# Patient Record
Sex: FEMALE | Race: WHITE | Employment: OTHER | URBAN - METROPOLITAN AREA
[De-identification: names, ages, dates, MRNs, and addresses within clinical notes are randomized per-mention and may not be internally consistent; named-entity substitution may affect disease eponyms.]

---

## 2017-04-18 ENCOUNTER — OFFICE VISIT (OUTPATIENT)
Dept: FAMILY MEDICINE | Facility: CLINIC | Age: 62
End: 2017-04-18
Payer: COMMERCIAL

## 2017-04-18 VITALS
SYSTOLIC BLOOD PRESSURE: 105 MMHG | WEIGHT: 103.25 LBS | TEMPERATURE: 97.6 F | HEART RATE: 79 BPM | DIASTOLIC BLOOD PRESSURE: 53 MMHG | HEIGHT: 66 IN | OXYGEN SATURATION: 98 % | RESPIRATION RATE: 18 BRPM | BODY MASS INDEX: 16.59 KG/M2

## 2017-04-18 DIAGNOSIS — F33.1 MAJOR DEPRESSIVE DISORDER, RECURRENT EPISODE, MODERATE (H): Primary | ICD-10-CM

## 2017-04-18 PROBLEM — F33.41 RECURRENT MAJOR DEPRESSIVE DISORDER, IN PARTIAL REMISSION (H): Status: ACTIVE | Noted: 2017-04-18

## 2017-04-18 PROBLEM — F33.41 RECURRENT MAJOR DEPRESSIVE DISORDER, IN PARTIAL REMISSION (H): Status: RESOLVED | Noted: 2017-04-18 | Resolved: 2017-04-18

## 2017-04-18 PROCEDURE — 99214 OFFICE O/P EST MOD 30 MIN: CPT | Performed by: NURSE PRACTITIONER

## 2017-04-18 RX ORDER — BUPROPION HYDROCHLORIDE 150 MG/1
150 TABLET ORAL EVERY MORNING
Qty: 30 TABLET | Refills: 1 | Status: SHIPPED | OUTPATIENT
Start: 2017-04-18 | End: 2018-01-16

## 2017-04-18 ASSESSMENT — ANXIETY QUESTIONNAIRES
2. NOT BEING ABLE TO STOP OR CONTROL WORRYING: NOT AT ALL
7. FEELING AFRAID AS IF SOMETHING AWFUL MIGHT HAPPEN: NOT AT ALL
6. BECOMING EASILY ANNOYED OR IRRITABLE: NOT AT ALL
5. BEING SO RESTLESS THAT IT IS HARD TO SIT STILL: NOT AT ALL
1. FEELING NERVOUS, ANXIOUS, OR ON EDGE: NOT AT ALL
GAD7 TOTAL SCORE: 0
3. WORRYING TOO MUCH ABOUT DIFFERENT THINGS: NOT AT ALL

## 2017-04-18 ASSESSMENT — PATIENT HEALTH QUESTIONNAIRE - PHQ9: 5. POOR APPETITE OR OVEREATING: NOT AT ALL

## 2017-04-18 NOTE — PROGRESS NOTES
"  SUBJECTIVE:                                                    Deya Mantilla is a 61 year old female who presents to clinic today for the following health issues:      Medication Followup of Lexapro 10 MG    Taking Medication as prescribed: yes. She states if she forgets to take Lexapro she does notice decreased mood.     Side Effects: Pt states that she seems to be more tired, she does sleep well, however in the morning she becomes drowsy, subdued, and sluggish. She also states that she has been a vegetarian for 10 years, she wonders if she is lacking protein.     Medication Helping Symptoms: YES.      She feels that Lexapro is a bit less effective than it had been initially.  She has noticed some decreased energy, fatigue and difficulty concentrating.        Problem list and histories reviewed & adjusted, as indicated.  Additional history: as documented        Reviewed and updated as needed this visit by clinical staff       Reviewed and updated as needed this visit by Provider         ROS:  C: NEGATIVE for fever, chills, change in weight  E/M: NEGATIVE for ear, mouth and throat problems  R: NEGATIVE for significant cough or SOB  CV: NEGATIVE for chest pain, palpitations or peripheral edema  GI: NEGATIVE for nausea, abdominal pain, heartburn, or change in bowel habits  MUSCULOSKELETAL: NEGATIVE for significant arthralgias or myalgia  NEURO: NEGATIVE for weakness, dizziness or paresthesias  PSYCHIATRIC: see HPI    OBJECTIVE:                                                    /53  Pulse 79  Temp 97.6  F (36.4  C) (Oral)  Resp 18  Ht 5' 5.5\" (1.664 m)  Wt 103 lb 4 oz (46.8 kg)  SpO2 98%  BMI 16.92 kg/m2  Body mass index is 16.92 kg/(m^2).  GENERAL: healthy, alert and no distress  RESP: lungs clear to auscultation - no rales, rhonchi or wheezes  CV: regular rate and rhythm, normal S1 S2, no S3 or S4, no murmur, click or rub, no peripheral edema and peripheral pulses strong  PSYCH: mentation " appears normal, affect normal/bright; PHQ-9 score of 8         ASSESSMENT/PLAN:                                                            1. Major depressive disorder, recurrent episode, moderate (H)  Not in remission  Will add Wellbutrin  mg daily and continue with Lexapro 20 mg.  Follow up in one month.   - buPROPion (WELLBUTRIN XL) 150 MG 24 hr tablet; Take 1 tablet (150 mg) by mouth every morning  Dispense: 30 tablet; Refill: 1        Kavita Lombardo NP  [facility]

## 2017-04-18 NOTE — PATIENT INSTRUCTIONS
Add Wellbutrin daily in the morning.   Continue with Lexapro.    Follow up in one month (can do an e-visit).

## 2017-04-18 NOTE — LETTER
My Depression Action Plan  Name: Deya Mantilla   Date of Birth 1955  Date: 4/18/2017    My doctor: Kavita Lombardo   My clinic: [address] [address]  [phone]          GREEN    ZONE   Good Control    What it looks like:     Things are going generally well. You have normal up s and down s. You may even feel depressed from time to time, but bad moods usually last less than a day.   What you need to do:  1. Continue to care for yourself (see self care plan)  2. Check your depression survival kit and update it as needed  3. Follow your physician s recommendations including any medication.  4. Do not stop taking medication unless you consult with your physician first.           YELLOW         ZONE Getting Worse    What it looks like:     Depression is starting to interfere with your life.     It may be hard to get out of bed; you may be starting to isolate yourself from others.    Symptoms of depression are starting to last most all day and this has happened for several days.     You may have suicidal thoughts but they are not constant.   What you need to do:     1. Call your care team, your response to treatment will improve if you keep your care team informed of your progress. Yellow periods are signs an adjustment may need to be made.     2. Continue your self-care, even if you have to fake it!    3. Talk to someone in your support network    4. Open up your depression survival kit           RED    ZONE Medical Alert - Get Help    What it looks like:     Depression is seriously interfering with your life.     You may experience these or other symptoms: You can t get out of bed most days, can t work or engage in other necessary activities, you have trouble taking care of basic hygiene, or basic responsibilities, thoughts of suicide or death that will not go away, self-injurious behavior.     What you need to do:  1. Call your care  team and request a same-day appointment. If they are not available (weekends or after hours) call your local crisis line, emergency room or 911.      Electronically signed by: Michelle Evans, April 18, 2017    Depression Self Care Plan / Survival Kit    Self-Care for Depression  Here s the deal. Your body and mind are really not as separate as most people think.  What you do and think affects how you feel and how you feel influences what you do and think. This means if you do things that people who feel good do, it will help you feel better.  Sometimes this is all it takes.  There is also a place for medication and therapy depending on how severe your depression is, so be sure to consult with your medical provider and/ or Behavioral Health Consultant if your symptoms are worsening or not improving.     In order to better manage my stress, I will:    Exercise  Get some form of exercise, every day. This will help reduce pain and release endorphins, the  feel good  chemicals in your brain. This is almost as good as taking antidepressants!  This is not the same as joining a gym and then never going! (they count on that by the way ) It can be as simple as just going for a walk or doing some gardening, anything that will get you moving.      Hygiene   Maintain good hygiene (Get out of bed in the morning, Make your bed, Brush your teeth, Take a shower, and Get dressed like you were going to work, even if you are unemployed).  If your clothes don't fit try to get ones that do.    Diet  I will strive to eat foods that are good for me, drink plenty of water, and avoid excessive sugar, caffeine, alcohol, and other mood-altering substances.  Some foods that are helpful in depression are: complex carbohydrates, B vitamins, flaxseed, fish or fish oil, fresh fruits and vegetables.    Psychotherapy  I agree to participate in Individual Therapy (if recommended).    Medication  If prescribed medications, I agree to take them.  Missing  doses can result in serious side effects.  I understand that drinking alcohol, or other illicit drug use, may cause potential side effects.  I will not stop my medication abruptly without first discussing it with my provider.    Staying Connected With Others  I will stay in touch with my friends, family members, and my primary care provider/team.    Use your imagination  Be creative.  We all have a creative side; it doesn t matter if it s oil painting, sand castles, or mud pies! This will also kick up the endorphins.    Witness Beauty  (AKA stop and smell the roses) Take a look outside, even in mid-winter. Notice colors, textures. Watch the squirrels and birds.     Service to others  Be of service to others.  There is always someone else in need.  By helping others we can  get out of ourselves  and remember the really important things.  This also provides opportunities for practicing all the other parts of the program.    Humor  Laugh and be silly!  Adjust your TV habits for less news and crime-drama and more comedy.    Control your stress  Try breathing deep, massage therapy, biofeedback, and meditation. Find time to relax each day.     My support system    Clinic Contact:  Phone number:    Contact 1:  Phone number:    Contact 2:  Phone number:    Sikhism/:  Phone number:    Therapist:  Phone number:    Local crisis center:    Phone number:    Other community support:  Phone number:

## 2017-04-18 NOTE — MR AVS SNAPSHOT
After Visit Summary   4/18/2017    Deya Mantilla    MRN: [id number]           Patient Information     Date Of Birth          1955        Visit Information        Provider Department      4/18/2017 8:15 AM Kavita Lombardo NP [facility]        Today's Diagnoses     Major depressive disorder, recurrent episode, moderate (H)    -  1      Care Instructions    Add Wellbutrin daily in the morning.   Continue with Lexapro.    Follow up in one month (can do an e-visit).         Follow-ups after your visit        Your next 10 appointments already scheduled     May 30, 2017  8:15 AM CDT   (Arrive by 8:00 AM)   MA SCREENING DIGITAL BILATERAL with [id number]   [facility] ([facility] [place])    [address] [address] [phone]           Do not use any powder, lotion or deodorant under your arms or on your breast. If you do, we will ask you to remove it before your exam.  Wear comfortable, two-piece clothing.  If you have any allergies, tell your care team.  Bring any previous mammograms from other facilities or have them mailed to the breast center. This mammogram location, [facility], now offers 3D mammography. It doesn't replace a screening mammogram and can be done with a regular screening mammogram. It is optional and not all insurances will pay for it. 3D mammography is a special kind of mammogram that produces a three-dimensional image of the breast by using low dose-xrays. 3D allows the radiologist to see the breast tissue differently from 2D, which reduces the chance of repeat testing due to overlapping breast tissue. If you are interested in have a 3D mammogram, please check with your insurance before you arrive for your exam. 3D mammography is offered to all patients. On the day of your exam you will be asked to sign a form stating yes, you wish to have 3D imaging  "or, no, you decline.              Future tests that were ordered for you today     Open Future Orders        Priority Expected Expires Ordered    *MA Screening Digital Bilateral Routine  4/18/2018 4/18/2017            Who to contact     If you have questions or need follow up information about today's clinic visit or your schedule please contact [facility] directly at [phone].  Normal or non-critical lab and imaging results will be communicated to you by Sightlogixhart, letter or phone within 4 business days after the clinic has received the results. If you do not hear from us within 7 days, please contact the clinic through [employer] or phone. If you have a critical or abnormal lab result, we will notify you by phone as soon as possible.  Submit refill requests through ZeroCater or call your pharmacy and they will forward the refill request to us. Please allow 3 business days for your refill to be completed.          Additional Information About Your Visit        S[employer]Net Information     ZeroCater gives you secure access to your electronic health record. If you see a primary care provider, you can also send messages to your care team and make appointments. If you have questions, please call your primary care clinic.  If you do not have a primary care provider, please call [phone] and they will assist you.        Care EveryWhere ID     This is your Care EveryWhere ID. This could be used by other organizations to access your [place] medical records  [id number]        Your Vitals Were     Pulse Temperature Respirations Height Pulse Oximetry BMI (Body Mass Index)    79 97.6  F (36.4  C) (Oral) 18 5' 5.5\" (1.664 m) 98% 16.92 kg/m2       Blood Pressure from Last 3 Encounters:   04/18/17 105/53   08/31/16 104/62   02/10/16 96/56    Weight from Last 3 Encounters:   04/18/17 103 lb 4 oz (46.8 kg)   08/31/16 101 lb (45.8 kg)   02/10/16 102 lb (46.3 kg)              We Performed the Following     DEPRESSION " ACTION PLAN (DAP)          Today's Medication Changes          These changes are accurate as of: 4/18/17  9:05 AM.  If you have any questions, ask your nurse or doctor.               Start taking these medicines.        Dose/Directions    buPROPion 150 MG 24 hr tablet   Commonly known as:  WELLBUTRIN XL   Used for:  Major depressive disorder, recurrent episode, moderate (H)   Started by:  Kavita Lombardo NP        Dose:  150 mg   Take 1 tablet (150 mg) by mouth every morning   Quantity:  30 tablet   Refills:  1            Where to get your medicines      These medications were sent to [facility] - [place], [place] - [address]  [address], [place] [address]     Phone:  [phone]     buPROPion 150 MG 24 hr tablet                Primary Care Provider Office Phone # Fax #    Kavita Lombardo [phone]       [facility] [address] [place]  [facility] [address]        Thank you!     Thank you for choosing [facility]  for your care. Our goal is always to provide you with excellent care. Hearing back from our patients is one way we can continue to improve our services. Please take a few minutes to complete the written survey that you may receive in the mail after your visit with us. Thank you!             Your Updated Medication List - Protect others around you: Learn how to safely use, store and throw away your medicines at www.disposemymeds.org.          This list is accurate as of: 4/18/17  9:05 AM.  Always use your most recent med list.                   Brand Name Dispense Instructions for use    buPROPion 150 MG 24 hr tablet    WELLBUTRIN XL    30 tablet    Take 1 tablet (150 mg) by mouth every morning       calcium 1250 MG Tabs      once daily       escitalopram 10 MG tablet    LEXAPRO    90 tablet    TAKE ONE TABLET BY MOUTH EVERY DAY       MULTIVITAMIN TABS   OR      once daily

## 2017-04-18 NOTE — NURSING NOTE
"Chief Complaint   Patient presents with     Recheck Medication     Lexapro        Initial /53  Pulse 79  Temp 97.6  F (36.4  C) (Oral)  Resp 18  Ht 5' 5.5\" (1.664 m)  Wt 103 lb 4 oz (46.8 kg)  SpO2 98%  BMI 16.92 kg/m2 Estimated body mass index is 16.92 kg/(m^2) as calculated from the following:    Height as of this encounter: 5' 5.5\" (1.664 m).    Weight as of this encounter: 103 lb 4 oz (46.8 kg).  Medication Reconciliation: complete     Michelle Evans MA      "

## 2017-04-19 ASSESSMENT — PATIENT HEALTH QUESTIONNAIRE - PHQ9: SUM OF ALL RESPONSES TO PHQ QUESTIONS 1-9: 8

## 2017-04-19 ASSESSMENT — ANXIETY QUESTIONNAIRES: GAD7 TOTAL SCORE: 0

## 2017-05-08 ENCOUNTER — MYC MEDICAL ADVICE (OUTPATIENT)
Dept: FAMILY MEDICINE | Facility: CLINIC | Age: 62
End: 2017-05-08

## 2017-05-09 ENCOUNTER — MYC MEDICAL ADVICE (OUTPATIENT)
Dept: FAMILY MEDICINE | Facility: CLINIC | Age: 62
End: 2017-05-09

## 2017-05-09 NOTE — TELEPHONE ENCOUNTER
That is fine if she doesn't want to take the Wellbutrin, but her symptoms seem to be related to a likely UTI, not a medication side effect.  I would recommend coming in to be evaluated for a UTI if she still has any symptoms.

## 2017-05-15 ENCOUNTER — MYC MEDICAL ADVICE (OUTPATIENT)
Dept: FAMILY MEDICINE | Facility: CLINIC | Age: 62
End: 2017-05-15

## 2017-05-16 ENCOUNTER — OFFICE VISIT (OUTPATIENT)
Dept: URGENT CARE | Facility: URGENT CARE | Age: 62
End: 2017-05-16
Payer: COMMERCIAL

## 2017-05-16 VITALS
HEART RATE: 91 BPM | BODY MASS INDEX: 17.16 KG/M2 | TEMPERATURE: 99 F | OXYGEN SATURATION: 97 % | HEIGHT: 65 IN | DIASTOLIC BLOOD PRESSURE: 67 MMHG | WEIGHT: 103 LBS | SYSTOLIC BLOOD PRESSURE: 110 MMHG

## 2017-05-16 DIAGNOSIS — R30.0 DYSURIA: ICD-10-CM

## 2017-05-16 DIAGNOSIS — R31.9 URINARY TRACT INFECTION WITH HEMATURIA, SITE UNSPECIFIED: Primary | ICD-10-CM

## 2017-05-16 DIAGNOSIS — J20.9 ACUTE BRONCHITIS, UNSPECIFIED ORGANISM: ICD-10-CM

## 2017-05-16 DIAGNOSIS — N39.0 URINARY TRACT INFECTION WITH HEMATURIA, SITE UNSPECIFIED: Primary | ICD-10-CM

## 2017-05-16 LAB
ALBUMIN UR-MCNC: 30 MG/DL
APPEARANCE UR: CLEAR
BACTERIA #/AREA URNS HPF: ABNORMAL /HPF
BILIRUB UR QL STRIP: NEGATIVE
COLOR UR AUTO: YELLOW
GLUCOSE UR STRIP-MCNC: NEGATIVE MG/DL
HGB UR QL STRIP: ABNORMAL
KETONES UR STRIP-MCNC: NEGATIVE MG/DL
LEUKOCYTE ESTERASE UR QL STRIP: ABNORMAL
MICRO REPORT STATUS: NORMAL
NITRATE UR QL: NEGATIVE
NON-SQ EPI CELLS #/AREA URNS LPF: ABNORMAL /LPF
PH UR STRIP: 6 PH (ref 5–7)
RBC #/AREA URNS AUTO: ABNORMAL /HPF (ref 0–2)
SP GR UR STRIP: 1.02 (ref 1–1.03)
SPECIMEN SOURCE: NORMAL
TRANS CELLS #/AREA URNS HPF: ABNORMAL /HPF
URN SPEC COLLECT METH UR: ABNORMAL
UROBILINOGEN UR STRIP-ACNC: 0.2 EU/DL (ref 0.2–1)
WBC #/AREA URNS AUTO: ABNORMAL /HPF (ref 0–2)
WET PREP SPEC: NORMAL

## 2017-05-16 PROCEDURE — 87086 URINE CULTURE/COLONY COUNT: CPT | Performed by: PHYSICIAN ASSISTANT

## 2017-05-16 PROCEDURE — 99214 OFFICE O/P EST MOD 30 MIN: CPT | Performed by: PHYSICIAN ASSISTANT

## 2017-05-16 PROCEDURE — 81001 URINALYSIS AUTO W/SCOPE: CPT | Performed by: INTERNAL MEDICINE

## 2017-05-16 PROCEDURE — 87210 SMEAR WET MOUNT SALINE/INK: CPT | Performed by: INTERNAL MEDICINE

## 2017-05-16 RX ORDER — BENZONATATE 200 MG/1
200 CAPSULE ORAL 3 TIMES DAILY PRN
Qty: 21 CAPSULE | Refills: 0 | Status: SHIPPED | OUTPATIENT
Start: 2017-05-16 | End: 2018-07-05

## 2017-05-16 RX ORDER — CEPHALEXIN 500 MG/1
500 CAPSULE ORAL 2 TIMES DAILY
Qty: 14 CAPSULE | Refills: 0 | Status: SHIPPED | OUTPATIENT
Start: 2017-05-16 | End: 2017-05-23

## 2017-05-16 NOTE — MR AVS SNAPSHOT
After Visit Summary   5/16/2017    Deya Mantilla    MRN: [id number]           Patient Information     Date Of Birth          1955        Visit Information        Provider Department      5/16/2017 6:00 PM Daysi Mcclure PA-C [facility]        Today's Diagnoses     Urinary tract infection with hematuria, site unspecified    -  1    Dysuria        Acute bronchitis, unspecified organism           Follow-ups after your visit        Your next 10 appointments already scheduled     Jun 02, 2017  8:15 AM CDT   (Arrive by 8:00 AM)   MA SCREENING DIGITAL BILATERAL with [email or web address]   [facility] [place] [facility] ([facility] [place])    [address] [facility]  2nd Floor  [facility] [address] [phone]           Do not use any powder, lotion or deodorant under your arms or on your breast. If you do, we will ask you to remove it before your exam.  Wear comfortable, two-piece clothing.  If you have any allergies, tell your care team.  Bring any previous mammograms from other facilities or have them mailed to the breast center. This mammogram location, [facility], now offers 3D mammography. It doesn't replace a screening mammogram and can be done with a regular screening mammogram. It is optional and not all insurances will pay for it. 3D mammography is a special kind of mammogram that produces a three-dimensional image of the breast by using low dose-xrays. 3D allows the radiologist to see the breast tissue differently from 2D, which reduces the chance of repeat testing due to overlapping breast tissue. If you are interested in have a 3D mammogram, please check with your insurance before you arrive for your exam. 3D mammography is offered to all patients. On the day of your exam you will be asked to sign a form stating yes, you wish to have 3D imaging or, no, you decline.              Who to contact     If you have  "questions or need follow up information about today's clinic visit or your schedule please contact [facility] directly at [phone].  Normal or non-critical lab and imaging results will be communicated to you by MyChart, letter or phone within 4 business days after the clinic has received the results. If you do not hear from us within 7 days, please contact the clinic through Graf[employer]hart or phone. If you have a critical or abnormal lab result, we will notify you by phone as soon as possible.  Submit refill requests through Sift or call your pharmacy and they will forward the refill request to us. Please allow 3 business days for your refill to be completed.          Additional Information About Your Visit        [employer]Single Touch Systems Information     Sift gives you secure access to your electronic health record. If you see a primary care provider, you can also send messages to your care team and make appointments. If you have questions, please call your primary care clinic.  If you do not have a primary care provider, please call [phone] and they will assist you.        Care EveryWhere ID     This is your Care EveryWhere ID. This could be used by other organizations to access your [place] medical records  [id number]        Your Vitals Were     Pulse Temperature Height Pulse Oximetry Breastfeeding? BMI (Body Mass Index)    91 99  F (37.2  C) (Tympanic) 5' 5\" (1.651 m) 97% No 17.14 kg/m2       Blood Pressure from Last 3 Encounters:   05/16/17 110/67   04/18/17 105/53   08/31/16 104/62    Weight from Last 3 Encounters:   05/16/17 103 lb (46.7 kg)   04/18/17 103 lb 4 oz (46.8 kg)   08/31/16 101 lb (45.8 kg)              We Performed the Following     *UA reflex to Microscopic and Culture ([place] [facility] [place] [facility] (except [place] and [place])     Urine Culture Aerobic Bacterial     Urine Microscopic     Wet prep          Today's Medication Changes          These changes are accurate as of: " 5/16/17  6:42 PM.  If you have any questions, ask your nurse or doctor.               Start taking these medicines.        Dose/Directions    benzonatate 200 MG capsule   Commonly known as:  TESSALON   Used for:  Acute bronchitis, unspecified organism   Started by:  Daysi Mcclure PA-C        Dose:  200 mg   Take 1 capsule (200 mg) by mouth 3 times daily as needed for cough   Quantity:  21 capsule   Refills:  0       cephALEXin 500 MG capsule   Commonly known as:  KEFLEX   Used for:  Urinary tract infection with hematuria, site unspecified   Started by:  Daysi Mcclure PA-C        Dose:  500 mg   Take 1 capsule (500 mg) by mouth 2 times daily for 7 days   Quantity:  14 capsule   Refills:  0            Where to get your medicines      These medications were sent to [facility] - [place], [place] - [address]  [address], [place] [address]     Phone:  [phone]     benzonatate 200 MG capsule    cephALEXin 500 MG capsule                Primary Care Provider Office Phone # Fax #    Kavita Lombardo [phone]       [facility] [address] [place] [address]  [facility] [address]        Thank you!     Thank you for choosing [facility]  for your care. Our goal is always to provide you with excellent care. Hearing back from our patients is one way we can continue to improve our services. Please take a few minutes to complete the written survey that you may receive in the mail after your visit with us. Thank you!             Your Updated Medication List - Protect others around you: Learn how to safely use, store and throw away your medicines at www.disposemymeds.org.          This list is accurate as of: 5/16/17  6:42 PM.  Always use your most recent med list.                   Brand Name Dispense Instructions for use    benzonatate 200 MG capsule    TESSALON    21 capsule    Take 1 capsule (200 mg) by mouth 3 times daily as needed for  cough       buPROPion 150 MG 24 hr tablet    WELLBUTRIN XL    30 tablet    Take 1 tablet (150 mg) by mouth every morning       calcium 1250 MG Tabs      once daily       cephALEXin 500 MG capsule    KEFLEX    14 capsule    Take 1 capsule (500 mg) by mouth 2 times daily for 7 days       escitalopram 10 MG tablet    LEXAPRO    90 tablet    TAKE ONE TABLET BY MOUTH EVERY DAY       MULTIVITAMIN TABS   OR      once daily

## 2017-05-16 NOTE — PROGRESS NOTES
HPI:  Deya is a 62 yo female who presents for 2 issues:   1)  Dry cough x 2-3 weeks.  Started as a cold.  Has been having headaches due to cough.  Body aches.   Denies SOB.  2)  Dysuria x 1+ week.  Reports seeing blood in urine at times.  Felt it was improving but now burning and frequency is more intense. Feels pressure in bladder area.   Reports fever last night of 102F.  Took a Took a left over penicillin tab last night for relief.  Last took ibuprofen at 2pm.        ROS:  See HPI      PE:  Vitals & nursing notes reviewed.  B/P: 110/67, T: 99, P: 91, R: Data Unavailable  Constitutional:  Alert, well nourished, well-developed, NAD  Head:  Atraumatic, normocephalic  Eyes:  Perrla, EOMI, conjunctiva:  Pink   Sclera:  Anicteric  Ears:  Canals clear BL, TM pearly BL  Throat:  No erythema, exudates, or edema to postoropharynx  Neck:  Supple, no cervical LAD  Lungs:  CTA, no wheezes, rhonchi, or rales  CV:  RRR,  no murmur appreciated  Abdomen:  Soft, NTTP, No HSM, No CVA tenderness, (+) bowel sounds,      ASSESSMENT:.  (N39.0,  R31.9) Urinary tract infection with hematuria, site unspecified  (primary encounter diagnosis)  Plan: Keflex 500mg BID x 7 days,.  Urine culture pending.   Increase H2O intake.  Discussed signs & sx of pyelonephritis.  OTC pyridium PRN.  Void before and after sexual intercourse,  Wipe front to back after using restroom.  Avoid caffeine and alcohol as they are bladder irritants.  Use condoms during sex as antibx may reduce BCP's effectiveness.   F/U with PCP if sx persist or worsen.    (J20.9) Acute bronchitis, unspecified organism  Comment: Suspect Viral.  Lungs CTA.  No current fever.  Plan: benzonatate (TESSALON) 200 MG capsule        Rest.  Push fluids.  Cool mist humdifier.  Warm liquids and/or honey for cough spasms and suppression.  Tylenol or advil for pain, HA, muscles aches, & fever PRN.   F/U with PCP if sx persist or worsen.

## 2017-05-16 NOTE — NURSING NOTE
"Chief Complaint   Patient presents with     Urgent Care     Cough     c/o cough,HA and fever for 1 week     UTI     c/o dysuria for 1 week       Initial /67 (BP Location: Left arm, Patient Position: Chair, Cuff Size: Adult Regular)  Pulse 91  Temp 99  F (37.2  C) (Tympanic)  Ht 5' 5\" (1.651 m)  Wt 103 lb (46.7 kg)  SpO2 97%  Breastfeeding? No  BMI 17.14 kg/m2 Estimated body mass index is 17.14 kg/(m^2) as calculated from the following:    Height as of this encounter: 5' 5\" (1.651 m).    Weight as of this encounter: 103 lb (46.7 kg).  Medication Reconciliation: complete   Shantel Moreno MA    "

## 2017-05-16 NOTE — TELEPHONE ENCOUNTER
I called and she is feeling better this AM. I did offer her a appt and she declined. She will call back if symptoms return.  Dedra Stovall RN

## 2017-05-17 LAB
BACTERIA SPEC CULT: NO GROWTH
MICRO REPORT STATUS: NORMAL
SPECIMEN SOURCE: NORMAL

## 2017-10-21 ENCOUNTER — HEALTH MAINTENANCE LETTER (OUTPATIENT)
Age: 62
End: 2017-10-21

## 2017-11-28 DIAGNOSIS — F41.9 ANXIETY: ICD-10-CM

## 2017-11-28 DIAGNOSIS — F33.1 MAJOR DEPRESSIVE DISORDER, RECURRENT EPISODE, MODERATE (H): ICD-10-CM

## 2017-12-01 ENCOUNTER — MYC REFILL (OUTPATIENT)
Dept: FAMILY MEDICINE | Facility: CLINIC | Age: 62
End: 2017-12-01

## 2017-12-01 DIAGNOSIS — F41.9 ANXIETY: ICD-10-CM

## 2017-12-01 DIAGNOSIS — F33.1 MAJOR DEPRESSIVE DISORDER, RECURRENT EPISODE, MODERATE (H): ICD-10-CM

## 2017-12-01 RX ORDER — ESCITALOPRAM OXALATE 10 MG/1
10 TABLET ORAL DAILY
Qty: 90 TABLET | Refills: 1 | Status: CANCELLED | OUTPATIENT
Start: 2017-12-01

## 2017-12-01 NOTE — TELEPHONE ENCOUNTER
Routing refill request to provider for review/approval because:  PHQ-9 > 5 and overdue for updated PHQ-9.    Nilsa-Please sign if agree.    MA-Please contact patient to get updated PHQ-9.    Thank you!  MORRO Bucio, RN               Last PHQ-9 score on record=   PHQ-9 SCORE 4/18/2017   Total Score -   Total Score MyChart -   Total Score 8

## 2017-12-01 NOTE — TELEPHONE ENCOUNTER
Medication Detail      Disp Refills Start End CHLOE   escitalopram (LEXAPRO) 10 MG tablet 90 tablet 1 4/10/2017  No   Sig: TAKE ONE TABLET BY MOUTH EVERY DAY     LOV   4-18-17

## 2017-12-04 ENCOUNTER — MYC MEDICAL ADVICE (OUTPATIENT)
Dept: FAMILY MEDICINE | Facility: CLINIC | Age: 62
End: 2017-12-04

## 2017-12-04 RX ORDER — ESCITALOPRAM OXALATE 10 MG/1
TABLET ORAL
Qty: 90 TABLET | Refills: 1 | Status: SHIPPED | OUTPATIENT
Start: 2017-12-04 | End: 2018-08-01

## 2017-12-04 ASSESSMENT — ANXIETY QUESTIONNAIRES
1. FEELING NERVOUS, ANXIOUS, OR ON EDGE: NOT AT ALL
7. FEELING AFRAID AS IF SOMETHING AWFUL MIGHT HAPPEN: SEVERAL DAYS
2. NOT BEING ABLE TO STOP OR CONTROL WORRYING: NOT AT ALL
GAD7 TOTAL SCORE: 3
3. WORRYING TOO MUCH ABOUT DIFFERENT THINGS: SEVERAL DAYS
3. WORRYING TOO MUCH ABOUT DIFFERENT THINGS: SEVERAL DAYS
5. BEING SO RESTLESS THAT IT IS HARD TO SIT STILL: NOT AT ALL
1. FEELING NERVOUS, ANXIOUS, OR ON EDGE: NOT AT ALL
GAD7 TOTAL SCORE: 3
7. FEELING AFRAID AS IF SOMETHING AWFUL MIGHT HAPPEN: SEVERAL DAYS
GAD7 TOTAL SCORE: 3
5. BEING SO RESTLESS THAT IT IS HARD TO SIT STILL: NOT AT ALL
6. BECOMING EASILY ANNOYED OR IRRITABLE: SEVERAL DAYS
4. TROUBLE RELAXING: NOT AT ALL
6. BECOMING EASILY ANNOYED OR IRRITABLE: SEVERAL DAYS
2. NOT BEING ABLE TO STOP OR CONTROL WORRYING: NOT AT ALL
7. FEELING AFRAID AS IF SOMETHING AWFUL MIGHT HAPPEN: SEVERAL DAYS
IF YOU CHECKED OFF ANY PROBLEMS ON THIS QUESTIONNAIRE, HOW DIFFICULT HAVE THESE PROBLEMS MADE IT FOR YOU TO DO YOUR WORK, TAKE CARE OF THINGS AT HOME, OR GET ALONG WITH OTHER PEOPLE: NOT DIFFICULT AT ALL
GAD7 TOTAL SCORE: 3

## 2017-12-04 ASSESSMENT — PATIENT HEALTH QUESTIONNAIRE - PHQ9
SUM OF ALL RESPONSES TO PHQ QUESTIONS 1-9: 3
10. IF YOU CHECKED OFF ANY PROBLEMS, HOW DIFFICULT HAVE THESE PROBLEMS MADE IT FOR YOU TO DO YOUR WORK, TAKE CARE OF THINGS AT HOME, OR GET ALONG WITH OTHER PEOPLE: NOT DIFFICULT AT ALL
SUM OF ALL RESPONSES TO PHQ QUESTIONS 1-9: 3
5. POOR APPETITE OR OVEREATING: NOT AT ALL

## 2017-12-04 NOTE — TELEPHONE ENCOUNTER
Message from Smart Picture [employer]:  Original authorizing provider: GIAN Loera [facility] would like a refill of the following medications:  escitalopram (LEXAPRO) 10 MG tablet [Kavita Lombardo NP]    Preferred pharmacy: [facility] - [place], [place] - [address]    Comment:  I called to get a refill on this medication several days ago. So far, I haven't got a call saying it will be refilled. I am down to a couple of tablets. If I am to stop this medication, please call and tell me. I do feel that it helping me. Let me know if I am not keep on with this. Deya

## 2017-12-05 ASSESSMENT — ANXIETY QUESTIONNAIRES: GAD7 TOTAL SCORE: 3

## 2017-12-19 ENCOUNTER — MYC MEDICAL ADVICE (OUTPATIENT)
Dept: FAMILY MEDICINE | Facility: CLINIC | Age: 62
End: 2017-12-19

## 2018-01-15 ENCOUNTER — MYC MEDICAL ADVICE (OUTPATIENT)
Dept: FAMILY MEDICINE | Facility: CLINIC | Age: 63
End: 2018-01-15

## 2018-01-15 DIAGNOSIS — F33.1 MAJOR DEPRESSIVE DISORDER, RECURRENT EPISODE, MODERATE (H): ICD-10-CM

## 2018-01-16 RX ORDER — BUPROPION HYDROCHLORIDE 150 MG/1
150 TABLET ORAL EVERY MORNING
Qty: 90 TABLET | Refills: 1 | Status: SHIPPED | OUTPATIENT
Start: 2018-01-16 | End: 2018-09-12

## 2018-03-17 ENCOUNTER — HEALTH MAINTENANCE LETTER (OUTPATIENT)
Age: 63
End: 2018-03-17

## 2018-07-05 ENCOUNTER — OFFICE VISIT (OUTPATIENT)
Dept: FAMILY MEDICINE | Facility: CLINIC | Age: 63
End: 2018-07-05
Payer: COMMERCIAL

## 2018-07-05 VITALS
RESPIRATION RATE: 16 BRPM | BODY MASS INDEX: 16.68 KG/M2 | DIASTOLIC BLOOD PRESSURE: 54 MMHG | SYSTOLIC BLOOD PRESSURE: 103 MMHG | HEART RATE: 83 BPM | HEIGHT: 65 IN | OXYGEN SATURATION: 100 % | WEIGHT: 100.13 LBS | TEMPERATURE: 98.2 F

## 2018-07-05 DIAGNOSIS — H61.22 IMPACTED CERUMEN OF LEFT EAR: Primary | ICD-10-CM

## 2018-07-05 DIAGNOSIS — F33.1 MAJOR DEPRESSIVE DISORDER, RECURRENT EPISODE, MODERATE (H): ICD-10-CM

## 2018-07-05 DIAGNOSIS — F41.9 ANXIETY: ICD-10-CM

## 2018-07-05 PROCEDURE — 99213 OFFICE O/P EST LOW 20 MIN: CPT | Performed by: NURSE PRACTITIONER

## 2018-07-05 ASSESSMENT — ANXIETY QUESTIONNAIRES
5. BEING SO RESTLESS THAT IT IS HARD TO SIT STILL: NOT AT ALL
IF YOU CHECKED OFF ANY PROBLEMS ON THIS QUESTIONNAIRE, HOW DIFFICULT HAVE THESE PROBLEMS MADE IT FOR YOU TO DO YOUR WORK, TAKE CARE OF THINGS AT HOME, OR GET ALONG WITH OTHER PEOPLE: NOT DIFFICULT AT ALL
3. WORRYING TOO MUCH ABOUT DIFFERENT THINGS: NOT AT ALL
6. BECOMING EASILY ANNOYED OR IRRITABLE: NOT AT ALL
1. FEELING NERVOUS, ANXIOUS, OR ON EDGE: NOT AT ALL
2. NOT BEING ABLE TO STOP OR CONTROL WORRYING: NOT AT ALL
7. FEELING AFRAID AS IF SOMETHING AWFUL MIGHT HAPPEN: NOT AT ALL
GAD7 TOTAL SCORE: 0

## 2018-07-05 ASSESSMENT — PATIENT HEALTH QUESTIONNAIRE - PHQ9: 5. POOR APPETITE OR OVEREATING: NOT AT ALL

## 2018-07-05 NOTE — NURSING NOTE
[facility] 62 [facility] who presents in clinic with complaint of impacted ear wax (cerumen).  Per the order of Kavita Lombardo, ear wax was removed from left side  by flushing with warm water and Diocto solution and manual debridement has not been performed. Patient denies pain/dizziness/discharge/drainage. Ear wax has been successfully removed.   Vickie Jimenez MA      .

## 2018-07-05 NOTE — MR AVS SNAPSHOT
"              After Visit Summary   7/5/2018    Dyea Mantilla    MRN: [id number]           Patient Information     Date Of Birth          1955        Visit Information        Provider Department      7/5/2018 11:15 AM Kavita Lombardo NP [facility]        Today's Diagnoses     Impacted cerumen of left ear    -  1    Major depressive disorder, recurrent episode, moderate (H)        Anxiety           Follow-ups after your visit        Who to contact     If you have questions or need follow up information about today's clinic visit or your schedule please contact [facility] directly at [phone].  Normal or non-critical lab and imaging results will be communicated to you by [employer] Inchart, letter or phone within 4 business days after the clinic has received the results. If you do not hear from us within 7 days, please contact the clinic through [employer] Inchart or phone. If you have a critical or abnormal lab result, we will notify you by phone as soon as possible.  Submit refill requests through [employer] or call your pharmacy and they will forward the refill request to us. Please allow 3 business days for your refill to be completed.          Additional Information About Your Visit        MyChart Information     [employer] gives you secure access to your electronic health record. If you see a primary care provider, you can also send messages to your care team and make appointments. If you have questions, please call your primary care clinic.  If you do not have a primary care provider, please call [phone] and they will assist you.        Care EveryWhere ID     This is your Care EveryWhere ID. This could be used by other organizations to access your [facility] medical records  [id number]        Your Vitals Were     Pulse Temperature Respirations Height Last Period Pulse Oximetry    83 98.2  F (36.8  C) (Oral) 16 5' 5\" (1.651 m) (LMP Unknown) 100%    Breastfeeding? BMI (Body " Mass Index)                No 16.66 kg/m2           Blood Pressure from Last 3 Encounters:   07/05/18 103/54   05/16/17 110/67   04/18/17 105/53    Weight from Last 3 Encounters:   07/05/18 100 lb 2 oz (45.4 kg)   05/16/17 103 lb (46.7 kg)   04/18/17 103 lb 4 oz (46.8 kg)              Today, you had the following     No orders found for display       Primary Care Provider Office Phone # Fax #    Kavita SHREYAS Lombardo [phone] [address] [facility] [address]        Equal Access to Services     [facility] CenterGIOVANY : Hadii maria luisa mayeno Antonieta, waaxda luqadaha, qaybta kaalmada adejing, wilner ureña . So [facility] [phone].    ATENCIÓN: Si habla español, tiene a domínguez disposición servicios gratuitos de asistencia lingüística. [facility] [phone].    We comply with applicable federal civil rights laws and [place] laws. We do not discriminate on the basis of race, color, national origin, age, disability, sex, sexual orientation, or gender identity.            Thank you!     Thank you for choosing [facility]  for your care. Our goal is always to provide you with excellent care. Hearing back from our patients is one way we can continue to improve our services. Please take a few minutes to complete the written survey that you may receive in the mail after your visit with us. Thank you!             Your Updated Medication List - Protect others around you: Learn how to safely use, store and throw away your medicines at www.disposemymeds.org.          This list is accurate as of 7/5/18 12:31 PM.  Always use your most recent med list.                   Brand Name Dispense Instructions for use Diagnosis    buPROPion 150 MG 24 hr tablet    WELLBUTRIN XL    90 tablet    Take 1 tablet (150 mg) by mouth every morning    Major depressive disorder, recurrent episode, moderate (H)       calcium 1250 MG Tabs      once daily        escitalopram 10 MG tablet     LEXAPRO    90 tablet    TAKE ONE TABLET BY MOUTH EVERY DAY    Anxiety, Major depressive disorder, recurrent episode, moderate (H)       MULTIVITAMIN TABS   OR      once daily

## 2018-07-05 NOTE — PROGRESS NOTES
"  SUBJECTIVE:   Deya Mantilla is a 62 year old female who presents to clinic today for the following health issues:      Concern:  Patient state she can not hear out of our left ear.   Therapy tried: tried cleaning it with qu-tips and flushing it out with warm water - nothing came out.  tried looking in her ear with an otoscope, may have scraped her ear canal.    No significant nasal congestion, sinus pressure.      Her depression and anxiety has been well-controlled with her current medications.  She denies any side effects.     Problem list and histories reviewed & adjusted, as indicated.  Additional history: as documented        Reviewed and updated as needed this visit by clinical staff       Reviewed and updated as needed this visit by Provider           OBJECTIVE:     /54  Pulse 83  Temp 98.2  F (36.8  C) (Oral)  Resp 16  Ht 5' 5\" (1.651 m)  Wt 100 lb 2 oz (45.4 kg)  LMP  (LMP Unknown)  SpO2 100%  Breastfeeding? No  BMI 16.66 kg/m2  Body mass index is 16.66 kg/(m^2).  GENERAL: healthy, alert and no distress  HENT: normal cephalic/atraumatic, right ear: normal: no effusions, no erythema, normal landmarks, left ear: TM obscured with cerumen, abrasion inferior ear canal, nose and mouth without ulcers or lesions, oropharynx clear and oral mucous membranes moist  NECK: no adenopathy, no asymmetry, masses, or scars and thyroid normal to palpation        ASSESSMENT/PLAN:             1. Impacted cerumen of left ear  Ear wash done by MA without instruments.  TM is clear after left ear wash.     2. Major depressive disorder, recurrent episode, moderate (H)  The current medical regimen is effective;  continue present plan and medications.     3. Anxiety  See above.      She will schedule a physical.    Kavita Lombardo NP  [facility]  "

## 2018-07-06 ASSESSMENT — ANXIETY QUESTIONNAIRES: GAD7 TOTAL SCORE: 0

## 2018-07-06 ASSESSMENT — PATIENT HEALTH QUESTIONNAIRE - PHQ9: SUM OF ALL RESPONSES TO PHQ QUESTIONS 1-9: 1

## 2018-09-12 ENCOUNTER — MYC MEDICAL ADVICE (OUTPATIENT)
Dept: FAMILY MEDICINE | Facility: CLINIC | Age: 63
End: 2018-09-12

## 2018-09-12 ENCOUNTER — OFFICE VISIT (OUTPATIENT)
Dept: FAMILY MEDICINE | Facility: CLINIC | Age: 63
End: 2018-09-12
Payer: COMMERCIAL

## 2018-09-12 VITALS
RESPIRATION RATE: 18 BRPM | TEMPERATURE: 98 F | WEIGHT: 103.25 LBS | BODY MASS INDEX: 17.18 KG/M2 | HEART RATE: 74 BPM | SYSTOLIC BLOOD PRESSURE: 104 MMHG | DIASTOLIC BLOOD PRESSURE: 62 MMHG | OXYGEN SATURATION: 98 %

## 2018-09-12 DIAGNOSIS — F33.1 MAJOR DEPRESSIVE DISORDER, RECURRENT EPISODE, MODERATE (H): ICD-10-CM

## 2018-09-12 DIAGNOSIS — M85.80 OSTEOPENIA, UNSPECIFIED LOCATION: ICD-10-CM

## 2018-09-12 DIAGNOSIS — Z23 NEED FOR PROPHYLACTIC VACCINATION AND INOCULATION AGAINST INFLUENZA: ICD-10-CM

## 2018-09-12 DIAGNOSIS — Z83.49 FAMILY HISTORY OF THYROID DISORDER: Primary | ICD-10-CM

## 2018-09-12 DIAGNOSIS — Z00.00 ROUTINE GENERAL MEDICAL EXAMINATION AT A HEALTH CARE FACILITY: Primary | ICD-10-CM

## 2018-09-12 DIAGNOSIS — Z23 NEED FOR SHINGLES VACCINE: ICD-10-CM

## 2018-09-12 DIAGNOSIS — F41.9 ANXIETY: ICD-10-CM

## 2018-09-12 LAB
ANION GAP SERPL CALCULATED.3IONS-SCNC: 10 MMOL/L (ref 3–14)
BUN SERPL-MCNC: 15 MG/DL (ref 7–30)
CALCIUM SERPL-MCNC: 8.5 MG/DL (ref 8.5–10.1)
CHLORIDE SERPL-SCNC: 104 MMOL/L (ref 94–109)
CHOLEST SERPL-MCNC: 228 MG/DL
CO2 SERPL-SCNC: 26 MMOL/L (ref 20–32)
CREAT SERPL-MCNC: 0.7 MG/DL (ref 0.52–1.04)
GFR SERPL CREATININE-BSD FRML MDRD: 85 ML/MIN/1.7M2
GLUCOSE SERPL-MCNC: 82 MG/DL (ref 70–99)
HDLC SERPL-MCNC: 89 MG/DL
LDLC SERPL CALC-MCNC: 128 MG/DL
NONHDLC SERPL-MCNC: 139 MG/DL
POTASSIUM SERPL-SCNC: 3.9 MMOL/L (ref 3.4–5.3)
SODIUM SERPL-SCNC: 140 MMOL/L (ref 133–144)
TRIGL SERPL-MCNC: 54 MG/DL

## 2018-09-12 PROCEDURE — 90472 IMMUNIZATION ADMIN EACH ADD: CPT | Performed by: NURSE PRACTITIONER

## 2018-09-12 PROCEDURE — 87624 HPV HI-RISK TYP POOLED RSLT: CPT | Performed by: NURSE PRACTITIONER

## 2018-09-12 PROCEDURE — 80061 LIPID PANEL: CPT | Performed by: NURSE PRACTITIONER

## 2018-09-12 PROCEDURE — 36415 COLL VENOUS BLD VENIPUNCTURE: CPT | Performed by: NURSE PRACTITIONER

## 2018-09-12 PROCEDURE — 82306 VITAMIN D 25 HYDROXY: CPT | Performed by: NURSE PRACTITIONER

## 2018-09-12 PROCEDURE — 90750 HZV VACC RECOMBINANT IM: CPT | Performed by: NURSE PRACTITIONER

## 2018-09-12 PROCEDURE — 80048 BASIC METABOLIC PNL TOTAL CA: CPT | Performed by: NURSE PRACTITIONER

## 2018-09-12 PROCEDURE — 90471 IMMUNIZATION ADMIN: CPT | Performed by: NURSE PRACTITIONER

## 2018-09-12 PROCEDURE — 99396 PREV VISIT EST AGE 40-64: CPT | Mod: 25 | Performed by: NURSE PRACTITIONER

## 2018-09-12 PROCEDURE — G0123 SCREEN CERV/VAG THIN LAYER: HCPCS | Performed by: NURSE PRACTITIONER

## 2018-09-12 PROCEDURE — 90682 RIV4 VACC RECOMBINANT DNA IM: CPT | Performed by: NURSE PRACTITIONER

## 2018-09-12 RX ORDER — ESCITALOPRAM OXALATE 10 MG/1
10 TABLET ORAL DAILY
Qty: 90 TABLET | Status: SHIPPED | OUTPATIENT
Start: 2018-09-12 | End: 2019-10-14

## 2018-09-12 RX ORDER — BUPROPION HYDROCHLORIDE 150 MG/1
150 TABLET ORAL EVERY MORNING
Qty: 90 TABLET | Status: SHIPPED | OUTPATIENT
Start: 2018-09-12 | End: 2019-10-14

## 2018-09-12 NOTE — NURSING NOTE
Prior to injection verified patient identity using patient's name and date of birth.  Due to injection administration, patient instructed to remain in clinic for 15 minutes  afterwards, and to report any adverse reaction to me immediately.    Screening Questionnaire for Adult Immunization    Are you sick today?   Yes   Do you have allergies to medications, food, a vaccine component or latex?   No   Have you ever had a serious reaction after receiving a vaccination?   No   Do you have a long-term health problem with heart disease, lung disease, asthma, kidney disease, metabolic disease (e.g. diabetes), anemia, or other blood disorder?   No   Do you have cancer, leukemia, HIV/AIDS, or any other immune system problem?   No   In the past 3 months, have you taken medications that affect  your immune system, such as prednisone, other steroids, or anticancer drugs; drugs for the treatment of rheumatoid arthritis, Crohn s disease, or psoriasis; or have you had radiation treatments?   No   Have you had a seizure, or a brain or other nervous system problem?   No   During the past year, have you received a transfusion of blood or blood     products, or been given immune (gamma) globulin or antiviral drug?   No   For women: Are you pregnant or is there a chance you could become        pregnant during the next month?   No   Have you received any vaccinations in the past 4 weeks?   No     Immunization questionnaire answers were all negative.        Per orders of Kavita Lombardo, injection of Shingrix and Flublok given by Michelle Evans. Patient instructed to remain in clinic for 15 minutes afterwards, and to report any adverse reaction to me immediately.       Screening performed by Michelle Evans on 9/12/2018 at 10:13 AM.

## 2018-09-12 NOTE — PROGRESS NOTES
SUBJECTIVE:   CC: Deya Mantilla is an 63 year old woman who presents for preventive health visit.     Physical   Annual:     Getting at least 3 servings of Calcium per day:  Yes    Bi-annual eye exam:  Yes    Dental care twice a year:  Yes    Sleep apnea or symptoms of sleep apnea:  None    Diet:  Regular (no restrictions)    Taking medications regularly:  Yes    Medication side effects:  None    Additional concerns today:  No  Imm/Inj               Today's PHQ-2 Score:   PHQ-2 ( 1999 Pfizer) 9/12/2018   Q1: Little interest or pleasure in doing things 0   Q2: Feeling down, depressed or hopeless 0   PHQ-2 Score 0   Q1: Little interest or pleasure in doing things Not at all   Q2: Feeling down, depressed or hopeless Not at all   PHQ-2 Score 0       Abuse: Current or Past(Physical, Sexual or Emotional)- No  Do you feel safe in your environment - Yes    Social History   Substance Use Topics     Smoking status: Never Smoker     Smokeless tobacco: Never Used     Alcohol use No      Comment: Social, very rare     Alcohol Use 9/12/2018   If you drink alcohol do you typically have greater than 3 drinks per day OR greater than 7 drinks per week? No       Reviewed orders with patient.  Reviewed health maintenance and updated orders accordingly - Yes          Pertinent mammograms are reviewed under the imaging tab.  History of abnormal Pap smear: NO - age 30-65 PAP every 5 years with negative HPV co-testing recommended  PAP / HPV 1/13/2015 11/9/2011 11/5/2010   PAP NIL NIL NIL     Reviewed and updated as needed this visit by clinical staff  Tobacco  Allergies  Meds  Med Hx  Surg Hx  Fam Hx  Soc Hx        Reviewed and updated as needed this visit by Provider            Review of Systems  CONSTITUTIONAL: NEGATIVE for fever, chills, change in weight  INTEGUMENTARY/SKIN: NEGATIVE for worrisome rashes, moles or lesions  EYES: NEGATIVE for vision changes or irritation  ENT: NEGATIVE for ear, mouth and throat  problems  RESP: NEGATIVE for significant cough or SOB  BREAST: NEGATIVE for masses, tenderness or discharge  CV: NEGATIVE for chest pain, palpitations or peripheral edema  GI: NEGATIVE for nausea, abdominal pain, heartburn, or change in bowel habits  : NEGATIVE for unusual urinary or vaginal symptoms. No vaginal bleeding.  MUSCULOSKELETAL: NEGATIVE for significant arthralgias or myalgia  NEURO: NEGATIVE for weakness, dizziness or paresthesias  PSYCHIATRIC: NEGATIVE for changes in mood or affect      OBJECTIVE:   /62  Pulse 74  Temp 98  F (36.7  C) (Oral)  Resp 18  Wt 103 lb 4 oz (46.8 kg)  SpO2 98%  BMI 17.18 kg/m2  Physical Exam  GENERAL: healthy, alert and no distress  EYES: Eyes grossly normal to inspection, PERRL and conjunctivae and sclerae normal  HENT: ear canals and TM's normal, nose and mouth without ulcers or lesions  NECK: no adenopathy, no asymmetry, masses, or scars and thyroid normal to palpation  RESP: lungs clear to auscultation - no rales, rhonchi or wheezes  BREAST: normal without masses, tenderness or nipple discharge and no palpable axillary masses or adenopathy  CV: regular rate and rhythm, normal S1 S2, no S3 or S4, no murmur, click or rub, no peripheral edema and peripheral pulses strong  ABDOMEN: soft, nontender, no hepatosplenomegaly, no masses and bowel sounds normal   (female): normal female external genitalia, normal urethral meatus, vaginal mucosa pink, moist, well rugated, and normal cervix/adnexa/uterus without masses or discharge  MS: no gross musculoskeletal defects noted, no edema  SKIN: no suspicious lesions or rashes  NEURO: Normal strength and tone, mentation intact and speech normal  PSYCH: mentation appears normal, affect normal/bright        ASSESSMENT/PLAN:   1. Routine general medical examination at a health care facility    - Vitamin D Deficiency  - Lipid panel reflex to direct LDL Fasting  - Pap imaged thin layer screen with HPV - recommended age 30 - 65  "years (select HPV order below)  - HPV High Risk Types DNA Cervical  - Basic metabolic panel    2. Major depressive disorder, recurrent episode, moderate (H)  In remission  The current medical regimen is effective;  continue present plan and medications.   - buPROPion (WELLBUTRIN XL) 150 MG 24 hr tablet; Take 1 tablet (150 mg) by mouth every morning  Dispense: 90 tablet; Refill: PRN  - escitalopram (LEXAPRO) 10 MG tablet; Take 1 tablet (10 mg) by mouth daily  Dispense: 90 tablet; Refill: PRN    3. Osteopenia, unspecified location    - Vitamin D Deficiency  - DX Hip/Pelvis/Spine; Future    4. Anxiety  In remission  The current medical regimen is effective;  continue present plan and medications.   - escitalopram (LEXAPRO) 10 MG tablet; Take 1 tablet (10 mg) by mouth daily  Dispense: 90 tablet; Refill: PRN    5. Need for shingles vaccine    - ZOSTER VACCINE RECOMBINANT ADJUVANTED IM NJX  - EA ADD'L VACCINE    6. Need for prophylactic vaccination and inoculation against influenza    - FLU VACCINE, (RIV4) RECOMBINANT HA  , IM (FluBlok, egg free) [24899]- >18 YRS (FMG recommended  50-64 YRS)  - Vaccine Administration, Initial [45351]    COUNSELING:  Reviewed preventive health counseling, as reflected in patient instructions    BP Readings from Last 1 Encounters:   09/12/18 104/62     Estimated body mass index is 17.18 kg/(m^2) as calculated from the following:    Height as of 7/5/18: 5' 5\" (1.651 m).    Weight as of this encounter: 103 lb 4 oz (46.8 kg).           reports that she has never smoked. She has never used smokeless tobacco.      Counseling Resources:  ATP IV Guidelines  Pooled Cohorts Equation Calculator  Breast Cancer Risk Calculator  FRAX Risk Assessment  ICSI Preventive Guidelines  Dietary Guidelines for Americans, 2010  [employer]'s MyPlate  ASA Prophylaxis  Lung CA Screening    Kavita Lombardo NP  [facility]  Answers for HPI/ROS submitted by the patient on 9/12/2018   PHQ-2 Score: 0    "

## 2018-09-12 NOTE — MR AVS SNAPSHOT
After Visit Summary   9/12/2018    Deya Mantilla    MRN: [id number]           Patient Information     Date Of Birth          1955        Visit Information        Provider Department      9/12/2018 9:15 AM Kavita Lombardo NP [facility]        Today's Diagnoses     Routine general medical examination at a health care facility    -  1    Major depressive disorder, recurrent episode, moderate (H)        Osteopenia, unspecified location        Anxiety        Need for shingles vaccine        Need for prophylactic vaccination and inoculation against influenza          Care Instructions      Preventive Health Recommendations  Female Ages 50 - 64    Yearly exam: See your health care provider every year in order to  o Review health changes.   o Discuss preventive care.    o Review your medicines if your doctor has prescribed any.      Get a Pap test every three years (unless you have an abnormal result and your provider advises testing more often).    If you get Pap tests with HPV test, you only need to test every 5 years, unless you have an abnormal result.     You do not need a Pap test if your uterus was removed (hysterectomy) and you have not had cancer.    You should be tested each year for STDs (sexually transmitted diseases) if you're at risk.     Have a mammogram every 1 to 2 years.    Have a colonoscopy at age 50, or have a yearly FIT test (stool test). These exams screen for colon cancer.      Have a cholesterol test every 5 years, or more often if advised.    Have a diabetes test (fasting glucose) every three years. If you are at risk for diabetes, you should have this test more often.     If you are at risk for osteoporosis (brittle bone disease), think about having a bone density scan (DEXA).    Shots: Get a flu shot each year. Get a tetanus shot every 10 years.    Nutrition:     Eat at least 5 servings of fruits and vegetables each day.    Eat whole-grain  bread, whole-wheat pasta and brown rice instead of white grains and rice.    Get adequate Calcium and Vitamin D.     Lifestyle    Exercise at least 150 minutes a week (30 minutes a day, 5 days a week). This will help you control your weight and prevent disease.    Limit alcohol to one drink per day.    No smoking.     Wear sunscreen to prevent skin cancer.     See your dentist every six months for an exam and cleaning.    See your eye doctor every 1 to 2 years.            Follow-ups after your visit        Follow-up notes from your care team     Return in about 1 year (around 9/12/2019).      Future tests that were ordered for you today     Open Future Orders        Priority Expected Expires Ordered    DX Hip/Pelvis/Spine Routine  9/12/2019 9/12/2018            Who to contact     If you have questions or need follow up information about today's clinic visit or your schedule please contact [facility] directly at [phone].  Normal or non-critical lab and imaging results will be communicated to you by [employer], letter or phone within 4 business days after the clinic has received the results. If you do not hear from us within 7 days, please contact the clinic through [employer] or phone. If you have a critical or abnormal lab result, we will notify you by phone as soon as possible.  Submit refill requests through [employer] or call your pharmacy and they will forward the refill request to us. Please allow 3 business days for your refill to be completed.          Additional Information About Your Visit        [employer] Information     [employer] gives you secure access to your electronic health record. If you see a primary care provider, you can also send messages to your care team and make appointments. If you have questions, please call your primary care clinic.  If you do not have a primary care provider, please call [phone] and they will assist you.        Care EveryWhere ID     This is your Care  EveryWhere ID. This could be used by other organizations to access your [facility] medical records  [id number]        Your Vitals Were     Pulse Temperature Respirations Pulse Oximetry BMI (Body Mass Index)       74 98  F (36.7  C) (Oral) 18 98% 17.18 kg/m2        Blood Pressure from Last 3 Encounters:   09/12/18 104/62   07/05/18 103/54   05/16/17 110/67    Weight from Last 3 Encounters:   09/12/18 103 lb 4 oz (46.8 kg)   07/05/18 100 lb 2 oz (45.4 kg)   05/16/17 103 lb (46.7 kg)              We Performed the Following     Basic metabolic panel     DEPRESSION ACTION PLAN (DAP)     EA ADD'L VACCINE     FLU VACCINE, (RIV4) RECOMBINANT HA  , IM (FluBlok, egg free) [89511]- >18 YRS (FMG recommended  50-64 YRS)     HPV High Risk Types DNA Cervical     Lipid panel reflex to direct LDL Fasting     Pap imaged thin layer screen with HPV - recommended age 30 - 65 years (select HPV order below)     Vaccine Administration, Initial [52812]     Vitamin D Deficiency     ZOSTER VACCINE RECOMBINANT ADJUVANTED IM NJX          Today's Medication Changes          These changes are accurate as of 9/12/18 10:15 AM.  If you have any questions, ask your nurse or doctor.               These medicines have changed or have updated prescriptions.        Dose/Directions    escitalopram 10 MG tablet   Commonly known as:  LEXAPRO   This may have changed:  See the new instructions.   Used for:  Anxiety, Major depressive disorder, recurrent episode, moderate (H)   Changed by:  Kavita Lombardo NP        Dose:  10 mg   Take 1 tablet (10 mg) by mouth daily   Quantity:  90 tablet   Refills:  PRN            Where to get your medicines      These medications were sent to [facility] - [place], [place] - [address]  [address], [place] [address]     Phone:  [phone]     buPROPion 150 MG 24 hr tablet    escitalopram 10 MG tablet                Primary Care Provider Office Phone # Fax #    Kavita Lombardo NP  [phone]       [address] [address]  [facility] [address]        Equal Access to Services     SONYA MOBLEY : Hadii aad ku hadhossein Fung, walelada luqanne, qaterrata kaalmada alisha, wilner fontanezponcho tunde. So [facility] [phone].    ATENCIÓN: Si habla español, tiene a domínguez disposición servicios gratuitos de asistencia lingüística. Llame al [phone].    We comply with applicable federal civil rights laws and [place] laws. We do not discriminate on the basis of race, color, national origin, age, disability, sex, sexual orientation, or gender identity.            Thank you!     Thank you for choosing [facility]  for your care. Our goal is always to provide you with excellent care. Hearing back from our patients is one way we can continue to improve our services. Please take a few minutes to complete the written survey that you may receive in the mail after your visit with us. Thank you!             Your Updated Medication List - Protect others around you: Learn how to safely use, store and throw away your medicines at www.disposemymeds.org.          This list is accurate as of 9/12/18 10:15 AM.  Always use your most recent med list.                   Brand Name Dispense Instructions for use Diagnosis    buPROPion 150 MG 24 hr tablet    WELLBUTRIN XL    90 tablet    Take 1 tablet (150 mg) by mouth every morning    Major depressive disorder, recurrent episode, moderate (H)       escitalopram 10 MG tablet    LEXAPRO    90 tablet    Take 1 tablet (10 mg) by mouth daily    Anxiety, Major depressive disorder, recurrent episode, moderate (H)       MULTIVITAMIN TABS   OR      once daily

## 2018-09-12 NOTE — PROGRESS NOTES

## 2018-09-13 ENCOUNTER — MYC MEDICAL ADVICE (OUTPATIENT)
Dept: FAMILY MEDICINE | Facility: CLINIC | Age: 63
End: 2018-09-13

## 2018-09-13 LAB — DEPRECATED CALCIDIOL+CALCIFEROL SERPL-MC: 34 UG/L (ref 20–75)

## 2018-09-13 NOTE — TELEPHONE ENCOUNTER
It is reasonable to do the test every couple of years, does not have to be yearly, so I think she can skip it this time and we can do next year.

## 2018-09-17 LAB
COPATH REPORT: NORMAL
PAP: NORMAL

## 2018-09-19 LAB
FINAL DIAGNOSIS: NORMAL
HPV HR 12 DNA CVX QL NAA+PROBE: NEGATIVE
HPV16 DNA SPEC QL NAA+PROBE: NEGATIVE
HPV18 DNA SPEC QL NAA+PROBE: NEGATIVE
SPECIMEN DESCRIPTION: NORMAL
SPECIMEN SOURCE CVX/VAG CYTO: NORMAL

## 2019-01-05 ENCOUNTER — OFFICE VISIT (OUTPATIENT)
Dept: URGENT CARE | Facility: URGENT CARE | Age: 64
End: 2019-01-05
Payer: COMMERCIAL

## 2019-01-05 VITALS
HEART RATE: 67 BPM | OXYGEN SATURATION: 98 % | BODY MASS INDEX: 17.14 KG/M2 | TEMPERATURE: 97.8 F | SYSTOLIC BLOOD PRESSURE: 112 MMHG | WEIGHT: 103 LBS | DIASTOLIC BLOOD PRESSURE: 67 MMHG

## 2019-01-05 DIAGNOSIS — I83.892 VARICOSE VEINS OF LEFT LOWER EXTREMITY WITH OTHER COMPLICATIONS: Primary | ICD-10-CM

## 2019-01-05 DIAGNOSIS — S80.12XA CONTUSION OF LEFT LOWER LEG, INITIAL ENCOUNTER: ICD-10-CM

## 2019-01-05 PROCEDURE — 99213 OFFICE O/P EST LOW 20 MIN: CPT | Performed by: INTERNAL MEDICINE

## 2019-01-05 NOTE — PROGRESS NOTES
SUBJECTIVE:   Deya Mantilla is a 63 year old female presenting with a chief complaint of   Chief Complaint   Patient presents with     Urgent Care     Pt in clinic to have eval for left leg vein swelling.     Varicose Vein       She is an established patient of [facility].    Patient comes in today with concerns of left varicose vein leaking    She noticed large amount of bruising and swelling in the areas around her varicose veins.    She does have a superficial scratch from her cat this morning in the same area.    Her and her  are concerned about potential for blood clot.    She does not recall any specific trauma although she does run into the baseboard of her bed consistently and usually hits around this area      No intake of aspirin.    Review of Systems    Past Medical History:   Diagnosis Date     Allergic rhinitis, cause unspecified      Anorexia nervosa      Depressive disorder      Depressive disorder, not elsewhere classified      Disorder of bone and cartilage, unspecified      Migraine, unspecified, without mention of intractable migraine without mention of status migrainosus     with auras     Personal history of colonic polyps 8/2007     Pure hypercholesterolemia     diet/exercise     Family History   Problem Relation Age of Onset     Hypertension Mother      Lipids Mother      Alzheimer Disease Mother      Hyperlipidemia Mother      Thyroid Disease Mother         hypothyroid     Prostate Cancer Father      Depression Father      Osteoporosis Paternal Grandmother      Mental Illness Other      Thyroid Disease Cousin      Current Outpatient Medications   Medication Sig Dispense Refill     Citalopram Hydrobromide (CELEXA PO)        escitalopram (LEXAPRO) 10 MG tablet Take 1 tablet (10 mg) by mouth daily 90 tablet PRN     buPROPion (WELLBUTRIN XL) 150 MG 24 hr tablet Take 1 tablet (150 mg) by mouth every morning (Patient not taking: Reported on 1/5/2019) 90 tablet PRN     MULTIVITAMIN  TABS   OR once daily       Social History     Tobacco Use     Smoking status: Never Smoker     Smokeless tobacco: Never Used   Substance Use Topics     Alcohol use: No     Comment: Social, very rare       OBJECTIVE   /67   Pulse 67   Temp 97.8  F (36.6  C) (Tympanic)   Wt 46.7 kg (103 lb)   SpO2 98%   BMI 17.14 kg/m      Physical Exam   Constitutional: She appears well-developed and well-nourished.   Musculoskeletal: She exhibits no edema or tenderness.   Lower extremities bilaterally are significant for varicose veins.  Left anterior shin -large areas of superficial ecchymosis in area of varicose vein.    Small superficial cat scratch noted 1.5 cm also an area   Vitals reviewed.      Labs:  No results found for this or any previous visit (from the past 24 hour(s)).        ASSESSMENT:      ICD-10-CM    1. Varicose veins of left lower extremity with other complications I83.892    2. Contusion of left lower leg, initial encounter S80.12XA         Medical Decision Making:  Reassured patient and her  that this is not a deep venous thrombosis.    Discussed bruising could be from trauma such as running into the baseboard at her bed.  Potentially she could have injured 1 of her varicose veins -causing it to bleed - this being  source of the superficial extensive bruise    PLAN:  Cool compresses  May elevate leg      Followup:    If not improving or if condition worsens, follow up with your Primary Care Provider

## 2019-06-17 ENCOUNTER — ANCILLARY PROCEDURE (OUTPATIENT)
Dept: MAMMOGRAPHY | Facility: CLINIC | Age: 64
End: 2019-06-17
Attending: NURSE PRACTITIONER
Payer: COMMERCIAL

## 2019-06-17 DIAGNOSIS — Z12.31 VISIT FOR SCREENING MAMMOGRAM: ICD-10-CM

## 2019-06-18 ENCOUNTER — ANCILLARY PROCEDURE (OUTPATIENT)
Dept: BONE DENSITY | Facility: CLINIC | Age: 64
End: 2019-06-18
Attending: NURSE PRACTITIONER
Payer: COMMERCIAL

## 2019-06-18 DIAGNOSIS — M85.80 OSTEOPENIA, UNSPECIFIED LOCATION: ICD-10-CM

## 2019-06-18 PROCEDURE — 77080 DXA BONE DENSITY AXIAL: CPT | Performed by: INTERNAL MEDICINE

## 2019-10-13 ASSESSMENT — ENCOUNTER SYMPTOMS
JOINT SWELLING: 0
ARTHRALGIAS: 0
HEADACHES: 0
DIARRHEA: 0
HEMATOCHEZIA: 0
DIZZINESS: 1
ABDOMINAL PAIN: 0
EYE PAIN: 0
COUGH: 0
FEVER: 0
HEMATURIA: 0
NERVOUS/ANXIOUS: 1
CHILLS: 0
HEARTBURN: 0
PALPITATIONS: 0
DYSURIA: 0
NAUSEA: 0
SORE THROAT: 0
WEAKNESS: 0
CONSTIPATION: 0
SHORTNESS OF BREATH: 0
PARESTHESIAS: 0
FREQUENCY: 0
MYALGIAS: 0

## 2019-10-13 ASSESSMENT — PATIENT HEALTH QUESTIONNAIRE - PHQ9
SUM OF ALL RESPONSES TO PHQ QUESTIONS 1-9: 3
10. IF YOU CHECKED OFF ANY PROBLEMS, HOW DIFFICULT HAVE THESE PROBLEMS MADE IT FOR YOU TO DO YOUR WORK, TAKE CARE OF THINGS AT HOME, OR GET ALONG WITH OTHER PEOPLE: NOT DIFFICULT AT ALL
SUM OF ALL RESPONSES TO PHQ QUESTIONS 1-9: 3

## 2019-10-14 ENCOUNTER — OFFICE VISIT (OUTPATIENT)
Dept: FAMILY MEDICINE | Facility: CLINIC | Age: 64
End: 2019-10-14
Payer: COMMERCIAL

## 2019-10-14 VITALS
BODY MASS INDEX: 17.02 KG/M2 | TEMPERATURE: 97.5 F | OXYGEN SATURATION: 99 % | DIASTOLIC BLOOD PRESSURE: 65 MMHG | SYSTOLIC BLOOD PRESSURE: 123 MMHG | HEART RATE: 80 BPM | RESPIRATION RATE: 18 BRPM | HEIGHT: 65 IN | WEIGHT: 102.13 LBS

## 2019-10-14 DIAGNOSIS — Z00.00 ROUTINE GENERAL MEDICAL EXAMINATION AT A HEALTH CARE FACILITY: Primary | ICD-10-CM

## 2019-10-14 DIAGNOSIS — F41.9 ANXIETY: ICD-10-CM

## 2019-10-14 DIAGNOSIS — Z23 NEED FOR PROPHYLACTIC VACCINATION AND INOCULATION AGAINST INFLUENZA: ICD-10-CM

## 2019-10-14 DIAGNOSIS — F33.1 MAJOR DEPRESSIVE DISORDER, RECURRENT EPISODE, MODERATE (H): ICD-10-CM

## 2019-10-14 LAB
ANION GAP SERPL CALCULATED.3IONS-SCNC: 8 MMOL/L (ref 3–14)
BUN SERPL-MCNC: 19 MG/DL (ref 7–30)
CALCIUM SERPL-MCNC: 8.7 MG/DL (ref 8.5–10.1)
CHLORIDE SERPL-SCNC: 104 MMOL/L (ref 94–109)
CHOLEST SERPL-MCNC: 222 MG/DL
CO2 SERPL-SCNC: 27 MMOL/L (ref 20–32)
CREAT SERPL-MCNC: 0.71 MG/DL (ref 0.52–1.04)
GFR SERPL CREATININE-BSD FRML MDRD: >90 ML/MIN/{1.73_M2}
GLUCOSE SERPL-MCNC: 88 MG/DL (ref 70–99)
HDLC SERPL-MCNC: 81 MG/DL
LDLC SERPL CALC-MCNC: 130 MG/DL
NONHDLC SERPL-MCNC: 141 MG/DL
POTASSIUM SERPL-SCNC: 4.6 MMOL/L (ref 3.4–5.3)
SODIUM SERPL-SCNC: 139 MMOL/L (ref 133–144)
TRIGL SERPL-MCNC: 55 MG/DL
TSH SERPL DL<=0.005 MIU/L-ACNC: 2.08 MU/L (ref 0.4–4)

## 2019-10-14 PROCEDURE — 80048 BASIC METABOLIC PNL TOTAL CA: CPT | Performed by: NURSE PRACTITIONER

## 2019-10-14 PROCEDURE — 84443 ASSAY THYROID STIM HORMONE: CPT | Performed by: NURSE PRACTITIONER

## 2019-10-14 PROCEDURE — 80061 LIPID PANEL: CPT | Performed by: NURSE PRACTITIONER

## 2019-10-14 PROCEDURE — 90682 RIV4 VACC RECOMBINANT DNA IM: CPT | Performed by: NURSE PRACTITIONER

## 2019-10-14 PROCEDURE — 36415 COLL VENOUS BLD VENIPUNCTURE: CPT | Performed by: NURSE PRACTITIONER

## 2019-10-14 PROCEDURE — 99396 PREV VISIT EST AGE 40-64: CPT | Mod: 25 | Performed by: NURSE PRACTITIONER

## 2019-10-14 PROCEDURE — 82306 VITAMIN D 25 HYDROXY: CPT | Performed by: NURSE PRACTITIONER

## 2019-10-14 PROCEDURE — 87389 HIV-1 AG W/HIV-1&-2 AB AG IA: CPT | Performed by: NURSE PRACTITIONER

## 2019-10-14 PROCEDURE — 99213 OFFICE O/P EST LOW 20 MIN: CPT | Mod: 25 | Performed by: NURSE PRACTITIONER

## 2019-10-14 PROCEDURE — 90471 IMMUNIZATION ADMIN: CPT | Performed by: NURSE PRACTITIONER

## 2019-10-14 PROCEDURE — 86803 HEPATITIS C AB TEST: CPT | Performed by: NURSE PRACTITIONER

## 2019-10-14 RX ORDER — ESCITALOPRAM OXALATE 10 MG/1
10 TABLET ORAL DAILY
Qty: 90 TABLET | Status: SHIPPED | OUTPATIENT
Start: 2019-10-14 | End: 2020-10-20

## 2019-10-14 RX ORDER — BUPROPION HYDROCHLORIDE 150 MG/1
150 TABLET ORAL EVERY MORNING
Qty: 90 TABLET | Status: SHIPPED | OUTPATIENT
Start: 2019-10-14 | End: 2020-10-20

## 2019-10-14 ASSESSMENT — ENCOUNTER SYMPTOMS
DYSURIA: 0
COUGH: 0
ABDOMINAL PAIN: 0
HEARTBURN: 0
HEADACHES: 0
PALPITATIONS: 0
HEMATURIA: 0
HEMATOCHEZIA: 0
ARTHRALGIAS: 0
EYE PAIN: 0
NAUSEA: 0
SHORTNESS OF BREATH: 0
DIARRHEA: 0
CHILLS: 0
SORE THROAT: 0
FREQUENCY: 0
MYALGIAS: 0
FEVER: 0
CONSTIPATION: 0
WEAKNESS: 0
NERVOUS/ANXIOUS: 1
JOINT SWELLING: 0
PARESTHESIAS: 0
DIZZINESS: 1

## 2019-10-14 ASSESSMENT — MIFFLIN-ST. JEOR: SCORE: 1023.08

## 2019-10-14 ASSESSMENT — PATIENT HEALTH QUESTIONNAIRE - PHQ9: SUM OF ALL RESPONSES TO PHQ QUESTIONS 1-9: 3

## 2019-10-14 NOTE — PROGRESS NOTES
SUBJECTIVE:   CC: Deya Mantilla is an 64 year old woman who presents for preventive health visit.     Healthy Habits:     Getting at least 3 servings of Calcium per day:  Yes    Bi-annual eye exam:  Yes    Dental care twice a year:  Yes    Sleep apnea or symptoms of sleep apnea:  None    Diet:  Vegetarian/vegan    Frequency of exercise:  4-5 days/week    Duration of exercise:  15-30 minutes    Taking medications regularly:  Yes    Medication side effects:  None    PHQ-2 Total Score: 2    Additional concerns today:  Yes    She is doing well on her current dose of Lexapro and Wellbutrin.  Her mood is stable and she denies any side effects.            Today's PHQ-2 Score:   PHQ-2 ( 1999 Pfizer) 10/13/2019   Q1: Little interest or pleasure in doing things 1   Q2: Feeling down, depressed or hopeless 1   PHQ-2 Score 2   Q1: Little interest or pleasure in doing things Several days   Q2: Feeling down, depressed or hopeless Several days   PHQ-2 Score 2       Abuse: Current or Past(Physical, Sexual or Emotional)- No  Do you feel safe in your environment? Yes    Social History     Tobacco Use     Smoking status: Never Smoker     Smokeless tobacco: Never Used   Substance Use Topics     Alcohol use: No     Comment: Social, very rare         Alcohol Use 10/13/2019   Prescreen: >3 drinks/day or >7 drinks/week? Not Applicable   Prescreen: >3 drinks/day or >7 drinks/week? -       Reviewed orders with patient.  Reviewed health maintenance and updated orders accordingly - Yes          Pertinent mammograms are reviewed under the imaging tab.  History of abnormal Pap smear: NO - age 30-65 PAP every 5 years with negative HPV co-testing recommended  PAP / HPV Latest Ref Rng & Units 9/12/2018 1/13/2015 11/9/2011   PAP - NIL NIL NIL   HPV 16 DNA NEG:Negative Negative - -   HPV 18 DNA NEG:Negative Negative - -   OTHER HR HPV NEG:Negative Negative - -     Reviewed and updated as needed this visit by clinical staff  Tobacco   "Allergies  Meds  Problems  Med Hx  Surg Hx  Fam Hx  Soc Hx          Reviewed and updated as needed this visit by Provider  Tobacco  Allergies  Meds  Problems  Med Hx  Surg Hx  Fam Hx            Review of Systems   Constitutional: Negative for chills and fever.   HENT: Negative for congestion, ear pain, hearing loss and sore throat.    Eyes: Negative for pain and visual disturbance.   Respiratory: Negative for cough and shortness of breath.    Cardiovascular: Negative for chest pain, palpitations and peripheral edema.   Gastrointestinal: Negative for abdominal pain, constipation, diarrhea, heartburn, hematochezia and nausea.   Breasts:  Negative for tenderness and discharge.   Genitourinary: Negative for dysuria, frequency, genital sores, hematuria, pelvic pain, urgency, vaginal bleeding and vaginal discharge.   Musculoskeletal: Negative for arthralgias, joint swelling and myalgias.   Skin: Negative for rash.   Neurological: Positive for dizziness. Negative for weakness, headaches and paresthesias.   Psychiatric/Behavioral: Negative for mood changes. The patient is nervous/anxious.           OBJECTIVE:   /65   Pulse 80   Temp 97.5  F (36.4  C) (Oral)   Resp 18   Ht 1.657 m (5' 5.25\")   Wt 46.3 kg (102 lb 2 oz)   LMP  (LMP Unknown)   SpO2 99%   BMI 16.86 kg/m    Physical Exam  GENERAL: healthy, alert and no distress  EYES: Eyes grossly normal to inspection, PERRL and conjunctivae and sclerae normal  HENT: ear canals and TM's normal, nose and mouth without ulcers or lesions  NECK: no adenopathy, no asymmetry, masses, or scars and thyroid normal to palpation  RESP: lungs clear to auscultation - no rales, rhonchi or wheezes  BREAST: normal without masses, tenderness or nipple discharge and no palpable axillary masses or adenopathy  CV: regular rate and rhythm, normal S1 S2, no S3 or S4, no murmur, click or rub, no peripheral edema and peripheral pulses strong  ABDOMEN: soft, nontender, no " "hepatosplenomegaly, no masses and bowel sounds normal  MS: no gross musculoskeletal defects noted, no edema  SKIN: no suspicious lesions or rashes  NEURO: Normal strength and tone, mentation intact and speech normal  PSYCH: mentation appears normal, affect normal/bright        ASSESSMENT/PLAN:   1. Routine general medical examination at a health care facility    - Vitamin D Deficiency  - TSH with free T4 reflex  - **Hepatitis C Screen Reflex to RNA FUTURE anytime  - HIV Antigen Antibody Combo  - Lipid panel reflex to direct LDL Fasting  - Basic metabolic panel    2. Major depressive disorder, recurrent episode, moderate (H)  In remission  The current medical regimen is effective;  continue present plan and medications.   - buPROPion (WELLBUTRIN XL) 150 MG 24 hr tablet; Take 1 tablet (150 mg) by mouth every morning  Dispense: 90 tablet; Refill: PRN  - escitalopram (LEXAPRO) 10 MG tablet; Take 1 tablet (10 mg) by mouth daily  Dispense: 90 tablet; Refill: PRN    3. Anxiety  In remission  The current medical regimen is effective;  continue present plan and medications.   - escitalopram (LEXAPRO) 10 MG tablet; Take 1 tablet (10 mg) by mouth daily  Dispense: 90 tablet; Refill: PRN    4. Need for prophylactic vaccination and inoculation against influenza    - C RIV4 (FLUBLOK) VACCINE RECOMBINANT DNA PRSRV ANTIBIO FREE, IM [85779]  - Vaccine Administration, Initial [12455]    COUNSELING:  Reviewed preventive health counseling, as reflected in patient instructions    Estimated body mass index is 16.86 kg/m  as calculated from the following:    Height as of this encounter: 1.657 m (5' 5.25\").    Weight as of this encounter: 46.3 kg (102 lb 2 oz).         reports that she has never smoked. She has never used smokeless tobacco.      Counseling Resources:  ATP IV Guidelines  Pooled Cohorts Equation Calculator  Breast Cancer Risk Calculator  FRAX Risk Assessment  ICSI Preventive Guidelines  Dietary Guidelines for Americans, " 2010  USDA's MyPlate  ASA Prophylaxis  Lung CA Screening    Kavita Lombardo NP  [facility]    The 10-year ASCVD risk score (Padma CODY Jr., et al., 2013) is: 3.5%    Values used to calculate the score:      Age: 64 years      Sex: Female      Is Non- : No      Diabetic: No      Tobacco smoker: No      Systolic Blood Pressure: 123 mmHg      Is BP treated: No      HDL Cholesterol: 89 mg/dL      Total Cholesterol: 228 mg/dL

## 2019-10-15 LAB
DEPRECATED CALCIDIOL+CALCIFEROL SERPL-MC: 26 UG/L (ref 20–75)
HCV AB SERPL QL IA: NONREACTIVE
HIV 1+2 AB+HIV1 P24 AG SERPL QL IA: NONREACTIVE

## 2019-11-05 ENCOUNTER — HEALTH MAINTENANCE LETTER (OUTPATIENT)
Age: 64
End: 2019-11-05

## 2020-10-20 ENCOUNTER — OFFICE VISIT (OUTPATIENT)
Dept: FAMILY MEDICINE | Facility: CLINIC | Age: 65
End: 2020-10-20
Payer: COMMERCIAL

## 2020-10-20 VITALS
HEIGHT: 66 IN | RESPIRATION RATE: 16 BRPM | DIASTOLIC BLOOD PRESSURE: 63 MMHG | HEART RATE: 78 BPM | SYSTOLIC BLOOD PRESSURE: 104 MMHG | OXYGEN SATURATION: 99 % | TEMPERATURE: 98 F | BODY MASS INDEX: 16.11 KG/M2 | WEIGHT: 100.25 LBS

## 2020-10-20 DIAGNOSIS — F33.1 MAJOR DEPRESSIVE DISORDER, RECURRENT EPISODE, MODERATE (H): ICD-10-CM

## 2020-10-20 DIAGNOSIS — Z00.00 ROUTINE GENERAL MEDICAL EXAMINATION AT A HEALTH CARE FACILITY: Primary | ICD-10-CM

## 2020-10-20 DIAGNOSIS — R53.83 FATIGUE, UNSPECIFIED TYPE: ICD-10-CM

## 2020-10-20 DIAGNOSIS — Z23 NEED FOR PROPHYLACTIC VACCINATION AND INOCULATION AGAINST INFLUENZA: ICD-10-CM

## 2020-10-20 DIAGNOSIS — F41.9 ANXIETY: ICD-10-CM

## 2020-10-20 LAB
DEPRECATED CALCIDIOL+CALCIFEROL SERPL-MC: 38 UG/L (ref 20–75)
ERYTHROCYTE [DISTWIDTH] IN BLOOD BY AUTOMATED COUNT: 13.2 % (ref 10–15)
HCT VFR BLD AUTO: 41.9 % (ref 35–47)
HGB BLD-MCNC: 13 G/DL (ref 11.7–15.7)
MCH RBC QN AUTO: 28.5 PG (ref 26.5–33)
MCHC RBC AUTO-ENTMCNC: 31 G/DL (ref 31.5–36.5)
MCV RBC AUTO: 92 FL (ref 78–100)
PLATELET # BLD AUTO: 243 10E9/L (ref 150–450)
RBC # BLD AUTO: 4.56 10E12/L (ref 3.8–5.2)
TSH SERPL DL<=0.005 MIU/L-ACNC: 2.3 MU/L (ref 0.4–4)
WBC # BLD AUTO: 5 10E9/L (ref 4–11)

## 2020-10-20 PROCEDURE — 82306 VITAMIN D 25 HYDROXY: CPT | Performed by: NURSE PRACTITIONER

## 2020-10-20 PROCEDURE — 99214 OFFICE O/P EST MOD 30 MIN: CPT | Mod: 25 | Performed by: NURSE PRACTITIONER

## 2020-10-20 PROCEDURE — 84443 ASSAY THYROID STIM HORMONE: CPT | Performed by: NURSE PRACTITIONER

## 2020-10-20 PROCEDURE — 85027 COMPLETE CBC AUTOMATED: CPT | Performed by: NURSE PRACTITIONER

## 2020-10-20 PROCEDURE — 99396 PREV VISIT EST AGE 40-64: CPT | Mod: 25 | Performed by: NURSE PRACTITIONER

## 2020-10-20 PROCEDURE — 90471 IMMUNIZATION ADMIN: CPT | Performed by: NURSE PRACTITIONER

## 2020-10-20 PROCEDURE — 90682 RIV4 VACC RECOMBINANT DNA IM: CPT | Performed by: NURSE PRACTITIONER

## 2020-10-20 PROCEDURE — 36415 COLL VENOUS BLD VENIPUNCTURE: CPT | Performed by: NURSE PRACTITIONER

## 2020-10-20 RX ORDER — ESCITALOPRAM OXALATE 10 MG/1
10 TABLET ORAL DAILY
Qty: 90 TABLET | Status: SHIPPED | OUTPATIENT
Start: 2020-10-20 | End: 2021-10-25

## 2020-10-20 RX ORDER — BUPROPION HYDROCHLORIDE 150 MG/1
150 TABLET ORAL EVERY MORNING
Qty: 90 TABLET | Status: SHIPPED | OUTPATIENT
Start: 2020-10-20 | End: 2021-10-25

## 2020-10-20 ASSESSMENT — ENCOUNTER SYMPTOMS
DIARRHEA: 0
CHILLS: 0
COUGH: 0
HEMATOCHEZIA: 0
CONSTIPATION: 0
EYE PAIN: 0
HEMATURIA: 0
NERVOUS/ANXIOUS: 1
DIZZINESS: 0
ABDOMINAL PAIN: 0

## 2020-10-20 ASSESSMENT — PATIENT HEALTH QUESTIONNAIRE - PHQ9
SUM OF ALL RESPONSES TO PHQ QUESTIONS 1-9: 3
SUM OF ALL RESPONSES TO PHQ QUESTIONS 1-9: 3
10. IF YOU CHECKED OFF ANY PROBLEMS, HOW DIFFICULT HAVE THESE PROBLEMS MADE IT FOR YOU TO DO YOUR WORK, TAKE CARE OF THINGS AT HOME, OR GET ALONG WITH OTHER PEOPLE: NOT DIFFICULT AT ALL

## 2020-10-20 ASSESSMENT — MIFFLIN-ST. JEOR: SCORE: 1013.54

## 2020-10-20 NOTE — PROGRESS NOTES
"   SUBJECTIVE:   CC: Deya Mantilla is an 65 year old woman who presents for preventive health visit.     {Split Bill scripting  The purpose of this visit is to discuss your medical history and prevent health problems before you are sick. You may be responsible for a co-pay, coinsurance, or deductible if your visit today includes services such as checking on a sore throat, having an x-ray or lab test, or treating and evaluating a new or existing condition     Patient has been advised of split billing requirements and indicates understanding: Yes     Healthy Habits:     Getting at least 3 servings of Calcium per day:  Yes    Bi-annual eye exam:  Yes    Dental care twice a year:  Yes    Sleep apnea or symptoms of sleep apnea:  None    Diet:  Regular (no restrictions)    Frequency of exercise:  6-7 days/week    Duration of exercise:  45-60 minutes    Taking medications regularly:  Yes    Medication side effects:  None    PHQ-2 Total Score: 2    Additional concerns today:  Yes  Left Leg feels tight, \"Sometimes feels like skin is shrinking\". No swelling, shiny skin, redness, warmth.  No back pain, weakness.  Started this summer   varicose veins     Her mood is stable and overall feels that her depression and anxiety is well-controlled on her current medication doses.  She has felt a bit fatigued lately.            Today's PHQ-2 Score:   PHQ-2 ( 1999 Pfizer) 10/20/2020   Q1: Little interest or pleasure in doing things 1   Q2: Feeling down, depressed or hopeless 1   PHQ-2 Score 2   Q1: Little interest or pleasure in doing things Several days   Q2: Feeling down, depressed or hopeless Several days   PHQ-2 Score 2       Abuse: Current or Past (Physical, Sexual or Emotional) - No  Do you feel safe in your environment? Yes    Have you ever done Advance Care Planning? (For example, a Health Directive, POLST, or a discussion with a medical provider or your loved ones about your wishes): MA gave pt information     Social " "History     Tobacco Use     Smoking status: Never Smoker     Smokeless tobacco: Never Used   Substance Use Topics     Alcohol use: No     Comment: Social, very rare         Alcohol Use 10/20/2020   Prescreen: >3 drinks/day or >7 drinks/week? Not Applicable   Prescreen: >3 drinks/day or >7 drinks/week? -       Reviewed orders with patient.  Reviewed health maintenance and updated orders accordingly - Yes          Pertinent mammograms are reviewed under the imaging tab.  History of abnormal Pap smear: NO - age 30-65 PAP every 5 years with negative HPV co-testing recommended  PAP / HPV Latest Ref Rng & Units 9/12/2018 1/13/2015 11/9/2011   PAP - NIL NIL NIL   HPV 16 DNA NEG:Negative Negative - -   HPV 18 DNA NEG:Negative Negative - -   OTHER HR HPV NEG:Negative Negative - -     Reviewed and updated as needed this visit by clinical staff  Tobacco  Allergies  Meds              Reviewed and updated as needed this visit by Provider                    Review of Systems   Constitutional: Negative for chills.   HENT: Negative for congestion and ear pain.    Eyes: Negative for pain.   Respiratory: Negative for cough.    Cardiovascular: Negative for chest pain.   Gastrointestinal: Negative for abdominal pain, constipation, diarrhea and hematochezia.   Genitourinary: Negative for hematuria.   Neurological: Negative for dizziness.   Psychiatric/Behavioral: The patient is nervous/anxious.           OBJECTIVE:   /63   Pulse 78   Temp 98  F (36.7  C) (Oral)   Resp 16   Ht 1.664 m (5' 5.5\")   Wt 45.5 kg (100 lb 4 oz)   LMP  (LMP Unknown)   SpO2 99%   BMI 16.43 kg/m    Physical Exam  GENERAL: healthy, alert and no distress  EYES: Eyes grossly normal to inspection, PERRL and conjunctivae and sclerae normal  HENT: ear canals and TM's normal, nose and mouth without ulcers or lesions  NECK: no adenopathy, no asymmetry, masses, or scars and thyroid normal to palpation  RESP: lungs clear to auscultation - no rales, rhonchi " "or wheezes  BREAST: normal without masses, tenderness or nipple discharge and no palpable axillary masses or adenopathy  CV: regular rate and rhythm, normal S1 S2, no S3 or S4, no murmur, click or rub, no peripheral edema and peripheral pulses strong  ABDOMEN: soft, nontender, no hepatosplenomegaly, no masses and bowel sounds normal  MS: no gross musculoskeletal defects noted, no edema  SKIN: no suspicious lesions or rashes  NEURO: Normal strength and tone, mentation intact and speech normal  PSYCH: mentation appears normal, affect normal/bright        ASSESSMENT/PLAN:   (Z00.00) Routine general medical examination at a health care facility  (primary encounter diagnosis)  Comment:   Plan:     (F33.1) Major depressive disorder, recurrent episode, moderate (H)  Comment: in remission  Plan: buPROPion (WELLBUTRIN XL) 150 MG 24 hr tablet,         escitalopram (LEXAPRO) 10 MG tablet        The current medical regimen is effective;  continue present plan and medications.     (F41.9) Anxiety  Comment: in remission  Plan: escitalopram (LEXAPRO) 10 MG tablet        The current medical regimen is effective;  continue present plan and medications.     (R53.83) Fatigue, unspecified type  Comment:   Plan: TSH with free T4 reflex, Vitamin D Deficiency,         CBC with platelets        Will check labs to rule out hypothyroidism, vitamin d deficiency and anemia.      (Z23) Need for prophylactic vaccination and inoculation against influenza  Comment:   Plan: INFLUENZA QUAD, RECOMBINANT, P-FREE (RIV4)         (FLUBLOCK) [28225], Vaccine Administration,         Initial [27023]              Patient has been advised of split billing requirements and indicates understanding:   COUNSELING:  Reviewed preventive health counseling, as reflected in patient instructions    Estimated body mass index is 16.43 kg/m  as calculated from the following:    Height as of this encounter: 1.664 m (5' 5.5\").    Weight as of this encounter: 45.5 kg (100 lb " 4 oz).        She reports that she has never smoked. She has never used smokeless tobacco.      Counseling Resources:  ATP IV Guidelines  Pooled Cohorts Equation Calculator  Breast Cancer Risk Calculator  BRCA-Related Cancer Risk Assessment: FHS-7 Tool  FRAX Risk Assessment  ICSI Preventive Guidelines  Dietary Guidelines for Americans, 2010  USDA's MyPlate  ASA Prophylaxis  Lung CA Screening    Kavita Lombardo NP  [facility]    The 10-year ASCVD risk score (Padma CODY Jr., et al., 2013) is: 3%    Values used to calculate the score:      Age: 65 years      Sex: Female      Is Non- : No      Diabetic: No      Tobacco smoker: No      Systolic Blood Pressure: 104 mmHg      Is BP treated: No      HDL Cholesterol: 81 mg/dL      Total Cholesterol: 222 mg/dL

## 2020-10-21 ASSESSMENT — PATIENT HEALTH QUESTIONNAIRE - PHQ9: SUM OF ALL RESPONSES TO PHQ QUESTIONS 1-9: 3

## 2021-03-02 ENCOUNTER — MYC MEDICAL ADVICE (OUTPATIENT)
Dept: FAMILY MEDICINE | Facility: CLINIC | Age: 66
End: 2021-03-02

## 2021-07-19 ENCOUNTER — MYC MEDICAL ADVICE (OUTPATIENT)
Dept: FAMILY MEDICINE | Facility: CLINIC | Age: 66
End: 2021-07-19

## 2021-07-20 NOTE — TELEPHONE ENCOUNTER
See RN response to patient's mychart message re:last Tdap    CHACE LaiN RN  [facility]

## 2021-09-19 ENCOUNTER — MYC MEDICAL ADVICE (OUTPATIENT)
Dept: FAMILY MEDICINE | Facility: CLINIC | Age: 66
End: 2021-09-19

## 2021-09-19 ENCOUNTER — HEALTH MAINTENANCE LETTER (OUTPATIENT)
Age: 66
End: 2021-09-19

## 2021-09-22 NOTE — TELEPHONE ENCOUNTER
Can she wait until her physical on 10/25/2021 to get her flu shot or should she come in sooner for the flu shot?    Thank you

## 2021-09-22 NOTE — TELEPHONE ENCOUNTER
Writer responded via [employer].    CHACE BucioN, RN  [facility]

## 2021-09-22 NOTE — TELEPHONE ENCOUNTER
She is OK to wait until her appointment for the flu shot or she can get it sooner at any pharmacy if she'd like.

## 2021-10-24 ASSESSMENT — ENCOUNTER SYMPTOMS
HEARTBURN: 0
DIARRHEA: 0
COUGH: 0
DIZZINESS: 0
PARESTHESIAS: 0
HEMATOCHEZIA: 0
NAUSEA: 0
BREAST MASS: 0
SORE THROAT: 0
CONSTIPATION: 0
CHILLS: 0
NERVOUS/ANXIOUS: 1
WEAKNESS: 0
EYE PAIN: 0
FEVER: 0
FREQUENCY: 0
PALPITATIONS: 0
JOINT SWELLING: 0
ARTHRALGIAS: 0
MYALGIAS: 0
ABDOMINAL PAIN: 0
SHORTNESS OF BREATH: 0
DYSURIA: 0
HEMATURIA: 0
HEADACHES: 1

## 2021-10-24 ASSESSMENT — ACTIVITIES OF DAILY LIVING (ADL): CURRENT_FUNCTION: NO ASSISTANCE NEEDED

## 2021-10-25 ENCOUNTER — OFFICE VISIT (OUTPATIENT)
Dept: FAMILY MEDICINE | Facility: CLINIC | Age: 66
End: 2021-10-25
Payer: COMMERCIAL

## 2021-10-25 VITALS
BODY MASS INDEX: 16.55 KG/M2 | OXYGEN SATURATION: 98 % | SYSTOLIC BLOOD PRESSURE: 104 MMHG | RESPIRATION RATE: 16 BRPM | HEIGHT: 66 IN | DIASTOLIC BLOOD PRESSURE: 68 MMHG | HEART RATE: 91 BPM | TEMPERATURE: 97.7 F | WEIGHT: 103 LBS

## 2021-10-25 DIAGNOSIS — F41.9 ANXIETY: ICD-10-CM

## 2021-10-25 DIAGNOSIS — Z23 HIGH PRIORITY FOR 2019-NCOV VACCINE: ICD-10-CM

## 2021-10-25 DIAGNOSIS — Z00.00 ENCOUNTER FOR MEDICARE ANNUAL WELLNESS EXAM: Primary | ICD-10-CM

## 2021-10-25 DIAGNOSIS — F33.1 MAJOR DEPRESSIVE DISORDER, RECURRENT EPISODE, MODERATE (H): ICD-10-CM

## 2021-10-25 PROCEDURE — 90471 IMMUNIZATION ADMIN: CPT | Performed by: NURSE PRACTITIONER

## 2021-10-25 PROCEDURE — 91301 COVID-19,PF,MODERNA (18+ YRS): CPT | Performed by: NURSE PRACTITIONER

## 2021-10-25 PROCEDURE — 0013A COVID-19,PF,MODERNA (18+ YRS): CPT | Performed by: NURSE PRACTITIONER

## 2021-10-25 PROCEDURE — 99397 PER PM REEVAL EST PAT 65+ YR: CPT | Mod: 25 | Performed by: NURSE PRACTITIONER

## 2021-10-25 PROCEDURE — 99214 OFFICE O/P EST MOD 30 MIN: CPT | Mod: 25 | Performed by: NURSE PRACTITIONER

## 2021-10-25 PROCEDURE — 96127 BRIEF EMOTIONAL/BEHAV ASSMT: CPT | Performed by: NURSE PRACTITIONER

## 2021-10-25 PROCEDURE — 90732 PPSV23 VACC 2 YRS+ SUBQ/IM: CPT | Performed by: NURSE PRACTITIONER

## 2021-10-25 RX ORDER — ESCITALOPRAM OXALATE 10 MG/1
10 TABLET ORAL DAILY
Qty: 90 TABLET | Status: SHIPPED | OUTPATIENT
Start: 2021-10-25 | End: 2022-11-16

## 2021-10-25 RX ORDER — BUPROPION HYDROCHLORIDE 300 MG/1
300 TABLET ORAL EVERY MORNING
Qty: 90 TABLET | Refills: 3 | Status: SHIPPED | OUTPATIENT
Start: 2021-10-25 | End: 2022-11-16

## 2021-10-25 ASSESSMENT — ENCOUNTER SYMPTOMS
BREAST MASS: 0
DYSURIA: 0
DIZZINESS: 0
ARTHRALGIAS: 0
NAUSEA: 0
ABDOMINAL PAIN: 0
COUGH: 0
CONSTIPATION: 0
HEADACHES: 1
NERVOUS/ANXIOUS: 1
PALPITATIONS: 0
EYE PAIN: 0
PARESTHESIAS: 0
FEVER: 0
FREQUENCY: 0
HEMATOCHEZIA: 0
SORE THROAT: 0
SHORTNESS OF BREATH: 0
WEAKNESS: 0
CHILLS: 0
JOINT SWELLING: 0
MYALGIAS: 0
DIARRHEA: 0
HEARTBURN: 0
HEMATURIA: 0

## 2021-10-25 ASSESSMENT — ACTIVITIES OF DAILY LIVING (ADL): CURRENT_FUNCTION: NO ASSISTANCE NEEDED

## 2021-10-25 ASSESSMENT — PATIENT HEALTH QUESTIONNAIRE - PHQ9: SUM OF ALL RESPONSES TO PHQ QUESTIONS 1-9: 11

## 2021-10-25 ASSESSMENT — MIFFLIN-ST. JEOR: SCORE: 1021.01

## 2021-10-25 NOTE — PATIENT INSTRUCTIONS
Patient Education   Personalized Prevention Plan  You are due for the preventive services outlined below.  Your care team is available to assist you in scheduling these services.  If you have already completed any of these items, please share that information with your care team to update in your medical record.  Health Maintenance Due   Topic Date Due     ANNUAL REVIEW OF HM ORDERS  Never done     Discuss Advance Care Planning  11/09/2016     Depression Assessment  04/20/2021     Mammogram  06/17/2021     FALL RISK ASSESSMENT  Never done     Pneumococcal Vaccine (1 of 1 - PPSV23) 07/10/2021     Your Health Risk Assessment indicates you feel you are not in good emotional health.    Recreation   Recreation is not limited to sports and team events. It includes any activity that provides relaxation, interest, enjoyment, and exercise. Recreation provides an outlet for physical, mental, and social energy. It can give a sense of worth and achievement. It can help you stay healthy.    Mental Exercise and Social Involvement  Mental and emotional health is as important as physical health. Keep in touch with friends and family. Stay as active as possible. Continue to learn and challenge yourself.   Things you can do to stay mentally active are:    Learn something new, like a foreign language or musical instrument.     Play SCRABBLE or do crossword puzzles. If you cannot find people to play these games with you at home, you can play them with others on your computer through the Internet.     Join a games club--anything from card games to chess or checkers or lawn bowling.     Start a new hobby.     Go back to school.     Volunteer.     Read.   Keep up with world events.

## 2021-10-25 NOTE — PROGRESS NOTES
"SUBJECTIVE:   Deya Mantilla is a 66 year old female who presents for Preventive Visit.      Patient has been advised of split billing requirements and indicates understanding: Yes   Are you in the first 12 months of your Medicare coverage? YES  Left eye 20/25 Right eye: 20/20 Both eyes:20/25    Healthy Habits:     In general, how would you rate your overall health?  Good    Frequency of exercise:  6-7 days/week    Duration of exercise:  30-45 minutes    Do you usually eat at least 4 servings of fruit and vegetables a day, include whole grains    & fiber and avoid regularly eating high fat or \"junk\" foods?  Yes    Taking medications regularly:  Yes    Medication side effects:  None    Ability to successfully perform activities of daily living:  No assistance needed    Home Safety:  No safety concerns identified    Hearing Impairment:  No hearing concerns    In the past 6 months, have you been bothered by leaking of urine?  No    In general, how would you rate your overall mental or emotional health?  Fair      PHQ-2 Total Score: 2    Additional concerns today:  No  Imm/Inj  Associated symptoms include headaches. Pertinent negatives include no abdominal pain, arthralgias, chest pain, chills, congestion, coughing, fever, joint swelling, myalgias, nausea, rash, sore throat or weakness.     Discuss Pneumonia vaccine       She has been feeling more down, less energy, more anhedonia, less motivation, more isolating.  Even going out for coffee with her best friends feels like a chore.  She is currently on Lexapro 10 mg and Wellbutrin  mg.  Her dad is in hospice in [place].    Do you feel safe in your environment? Yes    Have you ever done Advance Care Planning? (For example, a Health Directive, POLST, or a discussion with a medical provider or your loved ones about your wishes):        Fall risk  Fallen 2 or more times in the past year?: No  Any fall with injury in the past year?: No    Cognitive " Screening   1) Repeat 3 items (Leader, Season, Table)    2) Clock draw: NORMAL  3) 3 item recall: Recalls 3 objects  Results: 3 items recalled: COGNITIVE IMPAIRMENT LESS LIKELY    Mini-CogTM Copyright S Vaughn. Licensed by the author for use in [facility]; reprinted with permission (rima@[email or web address]). All rights reserved.          Reviewed and updated as needed this visit by clinical staff  Tobacco  Allergies  Meds   Med Hx  Surg Hx  Fam Hx  Soc Hx        Reviewed and updated as needed this visit by Provider                Social History     Tobacco Use     Smoking status: Never Smoker     Smokeless tobacco: Never Used   Substance Use Topics     Alcohol use: No     Comment: Social, very rare         Alcohol Use 10/24/2021   Prescreen: >3 drinks/day or >7 drinks/week? No   Prescreen: >3 drinks/day or >7 drinks/week? -               Current providers sharing in care for this patient include:   Patient Care Team:  Kavita Lombardo NP as PCP - General (Family Practice)  Kavita Lombardo NP as Assigned PCP    The following health maintenance items are reviewed in Epic and correct as of today:  Health Maintenance Due   Topic Date Due     ANNUAL REVIEW OF HM ORDERS  Never done     ADVANCE CARE PLANNING  11/09/2016     PHQ-9  04/20/2021     MAMMO SCREENING  06/17/2021     FALL RISK ASSESSMENT  Never done     Pneumococcal Vaccine: 65+ Years (1 of 1 - PPSV23) 07/10/2021           Breast CA Risk Assessment (FHS-7) 10/24/2021   Do you have a family history of breast, colon, or ovarian cancer? No / Unknown           Pertinent mammograms are reviewed under the imaging tab.    Review of Systems   Constitutional: Negative for chills and fever.   HENT: Negative for congestion, ear pain, hearing loss and sore throat.    Eyes: Negative for pain and visual disturbance.   Respiratory: Negative for cough and shortness of breath.    Cardiovascular: Negative for chest pain, palpitations and peripheral edema.  "  Gastrointestinal: Negative for abdominal pain, constipation, diarrhea, heartburn, hematochezia and nausea.   Breasts:  Negative for tenderness, breast mass and discharge.   Genitourinary: Negative for dysuria, frequency, genital sores, hematuria, pelvic pain, urgency, vaginal bleeding and vaginal discharge.   Musculoskeletal: Negative for arthralgias, joint swelling and myalgias.   Skin: Negative for rash.   Neurological: Positive for headaches. Negative for dizziness, weakness and paresthesias.   Psychiatric/Behavioral: Positive for mood changes. The patient is nervous/anxious.          OBJECTIVE:   /68   Pulse 91   Temp 97.7  F (36.5  C) (Oral)   Resp 16   Ht 1.664 m (5' 5.5\")   Wt 46.7 kg (103 lb)   LMP  (LMP Unknown)   SpO2 98%   BMI 16.88 kg/m   Estimated body mass index is 16.88 kg/m  as calculated from the following:    Height as of this encounter: 1.664 m (5' 5.5\").    Weight as of this encounter: 46.7 kg (103 lb).  Physical Exam  GENERAL: healthy, alert and no distress  EYES: Eyes grossly normal to inspection, PERRL and conjunctivae and sclerae normal  HENT: ear canals and TM's normal, nose and mouth without ulcers or lesions  NECK: no adenopathy, no asymmetry, masses, or scars and thyroid normal to palpation  RESP: lungs clear to auscultation - no rales, rhonchi or wheezes  BREAST: normal without masses, tenderness or nipple discharge and no palpable axillary masses or adenopathy  CV: regular rate and rhythm, normal S1 S2, no S3 or S4, no murmur, click or rub, no peripheral edema and peripheral pulses strong  ABDOMEN: soft, nontender, no hepatosplenomegaly, no masses and bowel sounds normal  MS: no gross musculoskeletal defects noted, no edema  SKIN: no suspicious lesions or rashes  NEURO: Normal strength and tone, mentation intact and speech normal  PSYCH: mentation appears normal, affect normal, pHQ_9 score of 11        ASSESSMENT / PLAN:   (Z00.00) Encounter for Medicare annual wellness " "exam  (primary encounter diagnosis)  Comment:   Plan: *MA Screening Digital Bilateral            (F33.1) Major depressive disorder, recurrent episode, moderate (H)  Comment: worsening  Plan: buPROPion (WELLBUTRIN XL) 300 MG 24 hr tablet,         escitalopram (LEXAPRO) 10 MG tablet, MENTAL         HEALTH REFERRAL  - Adult; Outpatient Treatment;        Individual/Couples/Family/Group Therapy/Health         Psychology; [facility]         [phone]; We will contact you to schedule        the appointment or please call with any         questions        Will increase dose of Wellbutrin to 300 mg and refer to therapy.   She will follow up in 4-6 weeks if symptoms are not improving.     (F41.9) Anxiety  Comment:   Plan: escitalopram (LEXAPRO) 10 MG tablet, MENTAL         HEALTH REFERRAL  - Adult; Outpatient Treatment;        Individual/Couples/Family/Group Therapy/Health         Psychology; [facility]         [phone]; We will contact you to schedule        the appointment or please call with any         questions            (Z23) High priority for 2019-nCoV vaccine  Comment:   Plan:     Patient has been advised of split billing requirements and indicates understanding:   COUNSELING:  Reviewed preventive health counseling, as reflected in patient instructions    Estimated body mass index is 16.88 kg/m  as calculated from the following:    Height as of this encounter: 1.664 m (5' 5.5\").    Weight as of this encounter: 46.7 kg (103 lb).    Weight management plan noted, stable and monitoring    She reports that she has never smoked. She has never used smokeless tobacco.      Appropriate preventive services were discussed with this patient, including applicable screening as appropriate for cardiovascular disease, diabetes, osteopenia/osteoporosis, and glaucoma.  As appropriate for age/gender, discussed screening for colorectal cancer, prostate cancer, breast cancer, and cervical cancer. " Checklist reviewing preventive services available has been given to the patient.    Reviewed patients plan of care and provided an AVS. The Basic Care Plan (routine screening as documented in Health Maintenance) for Deya meets the Care Plan requirement. This Care Plan has been established and reviewed with the Patient.    Counseling Resources:  ATP IV Guidelines  Pooled Cohorts Equation Calculator  Breast Cancer Risk Calculator  Breast Cancer: Medication to Reduce Risk  FRAX Risk Assessment  ICSI Preventive Guidelines  Dietary Guidelines for Americans, 2010  [employer]'s MyPlate  ASA Prophylaxis  Lung CA Screening    Kavita Lombardo NP  [facility]    Identified Health Risks:    The patient was provided with suggestions to help her develop a healthy emotional lifestyle.

## 2021-10-25 NOTE — NURSING NOTE
Prior to immunization administration, verified patients identity using patient s name and date of birth. Please see Immunization Activity for additional information.     Screening Questionnaire for Adult Immunization    Are you sick today?   No   Do you have allergies to medications, food, a vaccine component or latex?   percocet    Have you ever had a serious reaction after receiving a vaccination?   No   Do you have a long-term health problem with heart, lung, kidney, or metabolic disease (e.g., diabetes), asthma, a blood disorder, no spleen, complement component deficiency, a cochlear implant, or a spinal fluid leak?  Are you on long-term aspirin therapy?   No   Do you have cancer, leukemia, HIV/AIDS, or any other immune system problem?   No   Do you have a parent, brother, or sister with an immune system problem?   No   In the past 3 months, have you taken medications that affect  your immune system, such as prednisone, other steroids, or anticancer drugs; drugs for the treatment of rheumatoid arthritis, Crohn s disease, or psoriasis; or have you had radiation treatments?   No   Have you had a seizure, or a brain or other nervous system problem?   No   During the past year, have you received a transfusion of blood or blood    products, or been given immune (gamma) globulin or antiviral drug?   No   For women: Are you pregnant or is there a chance you could become       pregnant during the next month?   No   Have you received any vaccinations in the past 4 weeks?   Yes flu shot      Immunization questionnaire was positive for at least one answer.        Per orders of Kavita Lombardo, injection of 0.25 mL Moderna (3rd dose) & Pneumovax 23 given by Michelle Evans. Patient instructed to remain in clinic for 15 minutes afterwards, and to report any adverse reaction to me immediately.       Screening performed by Michelle Evans on 10/25/2021 at 8:45 AM.

## 2021-11-05 ENCOUNTER — ANCILLARY PROCEDURE (OUTPATIENT)
Dept: MAMMOGRAPHY | Facility: CLINIC | Age: 66
End: 2021-11-05
Attending: NURSE PRACTITIONER
Payer: COMMERCIAL

## 2021-11-05 DIAGNOSIS — Z00.00 ENCOUNTER FOR MEDICARE ANNUAL WELLNESS EXAM: ICD-10-CM

## 2021-11-05 PROCEDURE — 77067 SCR MAMMO BI INCL CAD: CPT | Mod: TC | Performed by: INTERNAL MEDICINE

## 2022-09-12 ENCOUNTER — MYC MEDICAL ADVICE (OUTPATIENT)
Dept: FAMILY MEDICINE | Facility: CLINIC | Age: 67
End: 2022-09-12

## 2022-09-13 NOTE — TELEPHONE ENCOUNTER
Nilsa,    Do you want to refer her to ortho?    I fell and hurt my knee about 2 weeks ago.  It swelled up and was bruised, couldn't really kneal on it, and was puffy.  I thought it would heal by not kneeling, putting ice packs on it, and being careful, but it hasn't.

## 2022-09-14 NOTE — TELEPHONE ENCOUNTER
Writer relayed message below to patient.    Jasvir Ndiaye, BSN RN  [facility]

## 2022-09-14 NOTE — TELEPHONE ENCOUNTER
Nilsa: pended ortho referral if you prefer that route. Pt is scheduled with you 11/16/22.     It is the left knee.   I can walk with no problem, but kneeling on it directly, does hurt.  It seems to be puffy with fluid.  At first, it was painful but over the last 2 weeks, it is gotten a lot better, but it does not like to bend sharply at all.  There are no open areas and it is not red, hot or painful.  I can wait until my appt. on  Nov. 16, but just wanted to let you know beforehand.  I never thought it was an emergency or urgent, just my clumsy self.   I fell on my knees and wrists on an uneven sidewalk trip.     CIERA Ireland RN  [facility]

## 2022-09-14 NOTE — TELEPHONE ENCOUNTER
It can often take several weeks for the inflammation/swelling to improve.  Since it's improving, we can just monitor and certainly check it out in November if it continues to bother her.   We can have her see ortho if for some reason symptoms worsen.

## 2022-09-29 DIAGNOSIS — F33.1 MAJOR DEPRESSIVE DISORDER, RECURRENT EPISODE, MODERATE (H): ICD-10-CM

## 2022-09-30 NOTE — TELEPHONE ENCOUNTER
Routing refill request to provider for review/approval because:  PHQ-9 score greater than 4 per [facility] protocol  PHQ-9 score:    PHQ 10/25/2021   PHQ-9 Total Score 11   Q9: Thoughts of better off dead/self-harm past 2 weeks Not at all         Last Written Prescription Date:  11/2/21  Last Fill Quantity: 180,  # refills: 1   Last office visit: 10/25/2021 with prescribing provider:  ROMÁN Lombardo   Future Office Visit:   Next 5 appointments (look out 90 days)    Nov 16, 2022  8:00 AM  (Arrive by 7:40 AM)  Annual Wellness Visit with Kavita Lombardo NP  [facility] ([facility] [address] [place] [id number] [phone]               MORRO Amaro RN  [facility]

## 2022-10-02 RX ORDER — BUPROPION HYDROCHLORIDE 100 MG/1
TABLET, EXTENDED RELEASE ORAL
Qty: 180 TABLET | Refills: 0 | Status: SHIPPED | OUTPATIENT
Start: 2022-10-02 | End: 2022-11-16

## 2022-10-04 ENCOUNTER — MYC REFILL (OUTPATIENT)
Dept: FAMILY MEDICINE | Facility: CLINIC | Age: 67
End: 2022-10-04

## 2022-10-04 DIAGNOSIS — F33.1 MAJOR DEPRESSIVE DISORDER, RECURRENT EPISODE, MODERATE (H): ICD-10-CM

## 2022-10-04 RX ORDER — BUPROPION HYDROCHLORIDE 100 MG/1
100 TABLET, EXTENDED RELEASE ORAL 2 TIMES DAILY
Qty: 180 TABLET | Refills: 0 | Status: CANCELLED | OUTPATIENT
Start: 2022-10-04

## 2022-10-06 NOTE — TELEPHONE ENCOUNTER
Response sent to patient via [employer].          PHQ 10/13/2019 10/20/2020 10/25/2021   PHQ-9 Total Score 3 3 11   Q9: Thoughts of better off dead/self-harm past 2 weeks Not at all Not at all Not at all

## 2022-11-15 ASSESSMENT — ENCOUNTER SYMPTOMS
WEAKNESS: 0
DIARRHEA: 0
JOINT SWELLING: 0
PALPITATIONS: 0
MYALGIAS: 0
DYSURIA: 0
ABDOMINAL PAIN: 0
SORE THROAT: 0
NERVOUS/ANXIOUS: 0
DIZZINESS: 0
HEMATURIA: 0
PARESTHESIAS: 0
NAUSEA: 0
FEVER: 0
HEARTBURN: 0
SHORTNESS OF BREATH: 0
HEADACHES: 0
COUGH: 0
FREQUENCY: 0
BREAST MASS: 0
ARTHRALGIAS: 0
EYE PAIN: 0
HEMATOCHEZIA: 0
CHILLS: 0
CONSTIPATION: 0

## 2022-11-15 ASSESSMENT — ACTIVITIES OF DAILY LIVING (ADL): CURRENT_FUNCTION: NO ASSISTANCE NEEDED

## 2022-11-16 ENCOUNTER — OFFICE VISIT (OUTPATIENT)
Dept: FAMILY MEDICINE | Facility: CLINIC | Age: 67
End: 2022-11-16
Payer: COMMERCIAL

## 2022-11-16 VITALS
DIASTOLIC BLOOD PRESSURE: 70 MMHG | WEIGHT: 103.6 LBS | RESPIRATION RATE: 20 BRPM | SYSTOLIC BLOOD PRESSURE: 110 MMHG | BODY MASS INDEX: 16.65 KG/M2 | HEART RATE: 82 BPM | TEMPERATURE: 98 F | HEIGHT: 66 IN | OXYGEN SATURATION: 100 %

## 2022-11-16 DIAGNOSIS — Z13.1 SCREENING FOR DIABETES MELLITUS: ICD-10-CM

## 2022-11-16 DIAGNOSIS — Z12.11 SCREEN FOR COLON CANCER: ICD-10-CM

## 2022-11-16 DIAGNOSIS — Z13.220 SCREENING CHOLESTEROL LEVEL: ICD-10-CM

## 2022-11-16 DIAGNOSIS — Z00.00 ENCOUNTER FOR MEDICARE ANNUAL WELLNESS EXAM: Primary | ICD-10-CM

## 2022-11-16 DIAGNOSIS — F41.9 ANXIETY: ICD-10-CM

## 2022-11-16 DIAGNOSIS — Z23 HIGH PRIORITY FOR 2019-NCOV VACCINE: ICD-10-CM

## 2022-11-16 DIAGNOSIS — F33.1 MAJOR DEPRESSIVE DISORDER, RECURRENT EPISODE, MODERATE (H): ICD-10-CM

## 2022-11-16 PROCEDURE — 91313 COVID-19,PF,MODERNA BIVALENT: CPT | Performed by: NURSE PRACTITIONER

## 2022-11-16 PROCEDURE — 99214 OFFICE O/P EST MOD 30 MIN: CPT | Mod: 25 | Performed by: NURSE PRACTITIONER

## 2022-11-16 PROCEDURE — 80061 LIPID PANEL: CPT | Performed by: NURSE PRACTITIONER

## 2022-11-16 PROCEDURE — 36415 COLL VENOUS BLD VENIPUNCTURE: CPT | Performed by: NURSE PRACTITIONER

## 2022-11-16 PROCEDURE — 99397 PER PM REEVAL EST PAT 65+ YR: CPT | Mod: 25 | Performed by: NURSE PRACTITIONER

## 2022-11-16 PROCEDURE — 90662 IIV NO PRSV INCREASED AG IM: CPT | Performed by: NURSE PRACTITIONER

## 2022-11-16 PROCEDURE — 90471 IMMUNIZATION ADMIN: CPT | Performed by: NURSE PRACTITIONER

## 2022-11-16 PROCEDURE — 80048 BASIC METABOLIC PNL TOTAL CA: CPT | Performed by: NURSE PRACTITIONER

## 2022-11-16 PROCEDURE — 96127 BRIEF EMOTIONAL/BEHAV ASSMT: CPT | Performed by: NURSE PRACTITIONER

## 2022-11-16 PROCEDURE — 0134A COVID-19,PF,MODERNA BIVALENT: CPT | Performed by: NURSE PRACTITIONER

## 2022-11-16 RX ORDER — BUPROPION HYDROCHLORIDE 100 MG/1
100 TABLET, EXTENDED RELEASE ORAL 2 TIMES DAILY
Qty: 180 TABLET | Refills: 3 | Status: SHIPPED | OUTPATIENT
Start: 2022-11-16 | End: 2023-11-20

## 2022-11-16 RX ORDER — ESCITALOPRAM OXALATE 10 MG/1
10 TABLET ORAL DAILY
Qty: 90 TABLET | Refills: 3 | Status: SHIPPED | OUTPATIENT
Start: 2022-11-16 | End: 2023-11-20

## 2022-11-16 RX ORDER — BUPROPION HYDROCHLORIDE 300 MG/1
300 TABLET ORAL EVERY MORNING
Qty: 90 TABLET | Refills: 3 | Status: SHIPPED | OUTPATIENT
Start: 2022-11-16 | End: 2023-11-20

## 2022-11-16 ASSESSMENT — ACTIVITIES OF DAILY LIVING (ADL): CURRENT_FUNCTION: NO ASSISTANCE NEEDED

## 2022-11-16 ASSESSMENT — ENCOUNTER SYMPTOMS
SHORTNESS OF BREATH: 0
JOINT SWELLING: 0
DYSURIA: 0
ABDOMINAL PAIN: 0
BREAST MASS: 0
SORE THROAT: 0
ARTHRALGIAS: 0
DIZZINESS: 0
WEAKNESS: 0
PARESTHESIAS: 0
HEMATURIA: 0
PALPITATIONS: 0
HEMATOCHEZIA: 0
HEADACHES: 0
DIARRHEA: 0
MYALGIAS: 0
HEARTBURN: 0
CHILLS: 0
NAUSEA: 0
COUGH: 0
FREQUENCY: 0
FEVER: 0
EYE PAIN: 0
CONSTIPATION: 0
NERVOUS/ANXIOUS: 0

## 2022-11-16 ASSESSMENT — PATIENT HEALTH QUESTIONNAIRE - PHQ9
10. IF YOU CHECKED OFF ANY PROBLEMS, HOW DIFFICULT HAVE THESE PROBLEMS MADE IT FOR YOU TO DO YOUR WORK, TAKE CARE OF THINGS AT HOME, OR GET ALONG WITH OTHER PEOPLE: NOT DIFFICULT AT ALL
SUM OF ALL RESPONSES TO PHQ QUESTIONS 1-9: 3
SUM OF ALL RESPONSES TO PHQ QUESTIONS 1-9: 3

## 2022-11-16 NOTE — PROGRESS NOTES
"SUBJECTIVE:   Deya Reinoso is a 67 year old who presents for Preventive Visit.  Patient has been advised of split billing requirements and indicates understanding: Yes  Are you in the first 12 months of your Medicare coverage?  No    Healthy Habits:     In general, how would you rate your overall health?  Good    Frequency of exercise:  4-5 days/week    Duration of exercise:  15-30 minutes    Do you usually eat at least 4 servings of fruit and vegetables a day, include whole grains    & fiber and avoid regularly eating high fat or \"junk\" foods?  Yes    Taking medications regularly:  No    Barriers to taking medications:  Problems remembering to take them    Medication side effects:  None    Ability to successfully perform activities of daily living:  No assistance needed    Home Safety:  Lack of grab bars in the bathroom    Hearing Impairment:  No hearing concerns    In the past 6 months, have you been bothered by leaking of urine?  No    In general, how would you rate your overall mental or emotional health?  Fair      PHQ-2 Total Score: 2    Additional concerns today:  No    She is doing well on her current dose of Wellbutrin (two different doses) and Lexapro.  She feels that it has been helpful, especially over the past year as she has had to navigate her dad's death and family relationship issues.  She denies any medication side effects.    Have you ever done Advance Care Planning? (For example, a Health Directive, POLST, or a discussion with a medical provider or your loved ones about your wishes): No, advance care planning information given to patient to review.  Patient plans to discuss their wishes with loved ones or provider.         Fall risk  Fallen 2 or more times in the past year?: Yes  Any fall with injury in the past year?: Yes  click delete button to remove this line now  Cognitive Screening   1) Repeat 3 items (Leader, Season, Table)    2) Clock draw: NORMAL  3) 3 item recall: Recalls 3 " objects  Results: 3 items recalled: COGNITIVE IMPAIRMENT LESS LIKELY    Mini-CogTM Copyright FRANCISCO Mendes. Licensed by the author for use in [facility]; reprinted with permission (rima@.[email or web address]). All rights reserved.      Do you have sleep apnea, excessive snoring or daytime drowsiness?: no    Reviewed and updated as needed this visit by clinical staff   Tobacco  Allergies  Meds  Problems  Med Hx  Surg Hx  Fam Hx          Reviewed and updated as needed this visit by Provider   Tobacco  Allergies  Meds  Problems  Med Hx  Surg Hx  Fam Hx         Social History     Tobacco Use     Smoking status: Never     Smokeless tobacco: Never   Substance Use Topics     Alcohol use: No     Comment: Social, very rare     If you drink alcohol do you typically have >3 drinks per day or >7 drinks per week? No    Alcohol Use 11/16/2022   Prescreen: >3 drinks/day or >7 drinks/week? -   Prescreen: >3 drinks/day or >7 drinks/week? No       Current providers sharing in care for this patient include:   Patient Care Team:  Kavita Lombardo NP as PCP - General (Family Practice)  Kavita Lombardo NP as Assigned PCP    The following health maintenance items are reviewed in Epic and correct as of today:  Health Maintenance   Topic Date Due     DTAP/TDAP/TD IMMUNIZATION (3 - Td or Tdap) 01/05/2022     COLORECTAL CANCER SCREENING  08/21/2022     MEDICARE ANNUAL WELLNESS VISIT  10/25/2022     Pneumococcal Vaccine: 65+ Years (2 - PCV) 10/25/2022     PHQ-9  05/16/2023     MAMMO SCREENING  11/05/2023     ANNUAL REVIEW OF HM ORDERS  11/16/2023     FALL RISK ASSESSMENT  11/16/2023     LIPID  10/14/2024     ADVANCE CARE PLANNING  11/16/2027     DEXA  06/18/2034     HEPATITIS C SCREENING  Completed     DEPRESSION ACTION PLAN  Completed     MIGRAINE ACTION PLAN  Completed     INFLUENZA VACCINE  Completed     ZOSTER IMMUNIZATION  Completed     COVID-19 Vaccine  Completed     IPV IMMUNIZATION  Aged Out     MENINGITIS IMMUNIZATION   "Aged Out     PAP  Discontinued         Breast CA Risk Assessment (FHS-7) 10/24/2021 11/5/2021   Do you have a family history of breast, colon, or ovarian cancer? No / Unknown No / Unknown           Pertinent mammograms are reviewed under the imaging tab.    Review of Systems   Constitutional: Negative for chills and fever.   HENT: Negative for congestion, ear pain, hearing loss and sore throat.    Eyes: Negative for pain and visual disturbance.   Respiratory: Negative for cough and shortness of breath.    Cardiovascular: Negative for chest pain, palpitations and peripheral edema.   Gastrointestinal: Negative for abdominal pain, constipation, diarrhea, heartburn, hematochezia and nausea.   Breasts:  Negative for tenderness, breast mass and discharge.   Genitourinary: Negative for dysuria, frequency, genital sores, hematuria, pelvic pain, urgency, vaginal bleeding and vaginal discharge.   Musculoskeletal: Negative for arthralgias, joint swelling and myalgias.   Skin: Negative for rash.   Neurological: Negative for dizziness, weakness, headaches and paresthesias.   Psychiatric/Behavioral: Negative for mood changes. The patient is not nervous/anxious.          OBJECTIVE:   /70 (BP Location: Right arm, Patient Position: Sitting, Cuff Size: Adult Regular)   Pulse 82   Temp 98  F (36.7  C) (Temporal)   Resp 20   Ht 1.669 m (5' 5.71\")   Wt 47 kg (103 lb 9.6 oz)   LMP  (LMP Unknown)   SpO2 100%   BMI 16.87 kg/m   Estimated body mass index is 16.87 kg/m  as calculated from the following:    Height as of this encounter: 1.669 m (5' 5.71\").    Weight as of this encounter: 47 kg (103 lb 9.6 oz).  Physical Exam  GENERAL: healthy, alert and no distress  EYES: Eyes grossly normal to inspection, PERRL and conjunctivae and sclerae normal  HENT: ear canals and TM's normal, nose and mouth without ulcers or lesions  NECK: no adenopathy, no asymmetry, masses, or scars and thyroid normal to palpation  RESP: lungs clear to " auscultation - no rales, rhonchi or wheezes  BREAST: normal without masses, tenderness or nipple discharge and no palpable axillary masses or adenopathy  CV: regular rate and rhythm, normal S1 S2, no S3 or S4, no murmur, click or rub, no peripheral edema and peripheral pulses strong  ABDOMEN: soft, nontender, no hepatosplenomegaly, no masses and bowel sounds normal  MS: no gross musculoskeletal defects noted, no edema  SKIN: no suspicious lesions or rashes  NEURO: Normal strength and tone, mentation intact and speech normal  PSYCH: mentation appears normal, affect normal/bright        ASSESSMENT / PLAN:   (Z00.00) Encounter for Medicare annual wellness exam  (primary encounter diagnosis)  Comment:   Plan: Adult Dermatology Referral            (Z12.11) Screen for colon cancer  Comment:   Plan: COLOGUARD([employer])            (F33.1) Major depressive disorder, recurrent episode, moderate (H)  Comment: in complete remission  Plan: escitalopram (LEXAPRO) 10 MG tablet, buPROPion         (WELLBUTRIN SR) 100 MG 12 hr tablet, buPROPion         (WELLBUTRIN XL) 300 MG 24 hr tablet        The current medical regimen is effective;  continue present plan and medications.     (F41.9) Anxiety  Comment: stable  Plan: escitalopram (LEXAPRO) 10 MG tablet        The current medical regimen is effective;  continue present plan and medications.     (Z23) High priority for 2019-nCoV vaccine  Comment:   Plan: COVID-19,PF,MODERNA BIVALENT 18+Yrs            (Z13.220) Screening cholesterol level  Comment:   Plan: Lipid panel reflex to direct LDL Fasting            (Z13.1) Screening for diabetes mellitus  Comment:   Plan: Basic metabolic panel  (Ca, Cl, CO2, Creat,         Gluc, K, Na, BUN)                    COUNSELING:  Reviewed preventive health counseling, as reflected in patient instructions        She reports that she has never smoked. She has never used smokeless tobacco.      Appropriate preventive services were discussed with  this patient, including applicable screening as appropriate for cardiovascular disease, diabetes, osteopenia/osteoporosis, and glaucoma.  As appropriate for age/gender, discussed screening for colorectal cancer, prostate cancer, breast cancer, and cervical cancer. Checklist reviewing preventive services available has been given to the patient.    Reviewed patients plan of care and provided an AVS. The Basic Care Plan (routine screening as documented in Health Maintenance) for Deya meets the Care Plan requirement. This Care Plan has been established and reviewed with the Patient.      Kavita Lombardo NP  [facility]    Identified Health Risks:  Answers for HPI/ROS submitted by the patient on 11/16/2022  If you checked off any problems, how difficult have these problems made it for you to do your work, take care of things at home, or get along with other people?: Not difficult at all  PHQ9 TOTAL SCORE: 3

## 2022-11-16 NOTE — PATIENT INSTRUCTIONS
Patient Education   Personalized Prevention Plan  You are due for the preventive services outlined below.  Your care team is available to assist you in scheduling these services.  If you have already completed any of these items, please share that information with your care team to update in your medical record.  Health Maintenance Due   Topic Date Due     Discuss Advance Care Planning  11/09/2016     COVID-19 Vaccine (4 - Booster for Moderna series) 12/20/2021     Diptheria Tetanus Pertussis (DTAP/TDAP/TD) Vaccine (3 - Td or Tdap) 01/05/2022     Colorectal Cancer Screening  08/21/2022     Flu Vaccine (1) 09/01/2022     ANNUAL REVIEW OF HM ORDERS  10/25/2022     Annual Wellness Visit  10/25/2022     Pneumococcal Vaccine (2 - PCV) 10/25/2022        At your visit today, we discussed your risk for falls and preventive options.    Fall Prevention  Falls often occur due to slipping, tripping or losing your balance. Millions of people fall every year and injure themselves. Here are ways to reduce your risk of falling again.     Think about your fall, was there anything that caused your fall that can be fixed, removed, or replaced?    Make your home safe by keeping walkways clear of objects you may trip over, such as electric cords.    Use non-slip pads under rugs. Don't use area rugs or small throw rugs.    Use non-slip mats in bathtubs and showers.    Install handrails and lights on staircases. The handrails should be on both sides of the stairs.    Don't walk in poorly lit areas.    Don't stand on chairs or wobbly ladders.    Use caution when reaching overhead or looking upward. This position can cause a loss of balance.    Be sure your shoes fit properly, have non-slip bottoms and are in good condition.     Wear shoes both inside and out. Don't go barefoot or wear slippers.    Be cautious when going up and down stairs, curbs, and when walking on uneven sidewalks.    If your balance is poor, consider using a cane or  walker.    If your fall was related to alcohol use, stop or limit alcohol intake.     If your fall was related to use of sleeping medicines, talk to your healthcare provider about this. You may need to reduce your dosage at bedtime if you awaken during the night to go to the bathroom.      To reduce the need for nighttime bathroom trips:  ? Don't drink fluids for several hours before going to bed  ? Empty your bladder before going to bed  ? Men can keep a urinal at the bedside    Stay as active as you can. Balance, flexibility, strength, and endurance all come from exercise. They all play a role in preventing falls. Ask your healthcare provider which types of activity are right for you.    Get your vision checked on a regular basis.    If you have pets, know where they are before you stand up or walk so you don't trip over them.    Use night lights.    Go over all your medicines with a pharmacist or other healthcare provider to see if any of them could make you more likely to fall.  Clinic[employer] last reviewed this educational content on 4/1/2018 2000-2021 [employer]. All rights reserved. This information is not intended as a substitute for professional medical care. Always follow your healthcare professional's instructions.

## 2022-11-17 LAB
ANION GAP SERPL CALCULATED.3IONS-SCNC: 14 MMOL/L (ref 7–15)
BUN SERPL-MCNC: 18.1 MG/DL (ref 8–23)
CALCIUM SERPL-MCNC: 9.1 MG/DL (ref 8.8–10.2)
CHLORIDE SERPL-SCNC: 101 MMOL/L (ref 98–107)
CHOLEST SERPL-MCNC: 229 MG/DL
CREAT SERPL-MCNC: 0.8 MG/DL (ref 0.51–0.95)
DEPRECATED HCO3 PLAS-SCNC: 22 MMOL/L (ref 22–29)
GFR SERPL CREATININE-BSD FRML MDRD: 81 ML/MIN/1.73M2
GLUCOSE SERPL-MCNC: 84 MG/DL (ref 70–99)
HDLC SERPL-MCNC: 85 MG/DL
LDLC SERPL CALC-MCNC: 132 MG/DL
NONHDLC SERPL-MCNC: 144 MG/DL
POTASSIUM SERPL-SCNC: 4.1 MMOL/L (ref 3.4–5.3)
SODIUM SERPL-SCNC: 137 MMOL/L (ref 136–145)
TRIGL SERPL-MCNC: 58 MG/DL

## 2022-12-07 LAB — NONINV COLON CA DNA+OCC BLD SCRN STL QL: NEGATIVE

## 2023-02-20 ENCOUNTER — HOSPITAL ENCOUNTER (EMERGENCY)
Facility: CLINIC | Age: 68
Discharge: HOME | End: 2023-02-20
Attending: FAMILY MEDICINE | Admitting: FAMILY MEDICINE
Payer: COMMERCIAL

## 2023-02-20 ENCOUNTER — APPOINTMENT (OUTPATIENT)
Dept: GENERAL RADIOLOGY | Facility: CLINIC | Age: 68
End: 2023-02-20
Attending: FAMILY MEDICINE
Payer: COMMERCIAL

## 2023-02-20 ENCOUNTER — APPOINTMENT (OUTPATIENT)
Dept: GENERAL RADIOLOGY | Facility: CLINIC | Age: 68
End: 2023-02-20
Attending: EMERGENCY MEDICINE
Payer: COMMERCIAL

## 2023-02-20 VITALS
DIASTOLIC BLOOD PRESSURE: 66 MMHG | WEIGHT: 103 LBS | BODY MASS INDEX: 16.55 KG/M2 | HEIGHT: 66 IN | RESPIRATION RATE: 18 BRPM | SYSTOLIC BLOOD PRESSURE: 103 MMHG | TEMPERATURE: 98.2 F | HEART RATE: 72 BPM | OXYGEN SATURATION: 97 %

## 2023-02-20 DIAGNOSIS — S52.501A CLOSED FRACTURE OF DISTAL END OF RIGHT RADIUS, UNSPECIFIED FRACTURE MORPHOLOGY, INITIAL ENCOUNTER: ICD-10-CM

## 2023-02-20 PROCEDURE — 99285 EMERGENCY DEPT VISIT HI MDM: CPT | Mod: 25 | Performed by: FAMILY MEDICINE

## 2023-02-20 PROCEDURE — 250N000011 HC RX IP 250 OP 636: Performed by: EMERGENCY MEDICINE

## 2023-02-20 PROCEDURE — 73110 X-RAY EXAM OF WRIST: CPT | Mod: RT

## 2023-02-20 PROCEDURE — 25605 CLTX DST RDL FX/EPHYS SEP W/: CPT | Mod: RT | Performed by: FAMILY MEDICINE

## 2023-02-20 PROCEDURE — 96374 THER/PROPH/DIAG INJ IV PUSH: CPT | Performed by: FAMILY MEDICINE

## 2023-02-20 PROCEDURE — 99284 EMERGENCY DEPT VISIT MOD MDM: CPT | Mod: 25 | Performed by: FAMILY MEDICINE

## 2023-02-20 PROCEDURE — 999N000065 XR WRIST RIGHT G/E 3 VIEWS: Mod: RT

## 2023-02-20 RX ORDER — ONDANSETRON 2 MG/ML
4 INJECTION INTRAMUSCULAR; INTRAVENOUS EVERY 30 MIN PRN
Status: DISCONTINUED | OUTPATIENT
Start: 2023-02-20 | End: 2023-02-20 | Stop reason: HOSPADM

## 2023-02-20 RX ORDER — BUPIVACAINE HYDROCHLORIDE 5 MG/ML
INJECTION, SOLUTION EPIDURAL; INTRACAUDAL
Status: DISCONTINUED
Start: 2023-02-20 | End: 2023-02-20 | Stop reason: HOSPADM

## 2023-02-20 RX ORDER — IBUPROFEN 800 MG/1
800 TABLET, FILM COATED ORAL EVERY 8 HOURS PRN
Qty: 15 TABLET | Refills: 0 | Status: SHIPPED | OUTPATIENT
Start: 2023-02-20 | End: 2023-02-28

## 2023-02-20 RX ORDER — LIDOCAINE HYDROCHLORIDE 10 MG/ML
INJECTION, SOLUTION INFILTRATION; PERINEURAL
Status: DISCONTINUED
Start: 2023-02-20 | End: 2023-02-20 | Stop reason: HOSPADM

## 2023-02-20 RX ORDER — KETOROLAC TROMETHAMINE 15 MG/ML
15 INJECTION, SOLUTION INTRAMUSCULAR; INTRAVENOUS ONCE
Status: COMPLETED | OUTPATIENT
Start: 2023-02-20 | End: 2023-02-20

## 2023-02-20 RX ADMIN — KETOROLAC TROMETHAMINE 15 MG: 15 INJECTION, SOLUTION INTRAMUSCULAR; INTRAVENOUS at 15:18

## 2023-02-20 ASSESSMENT — ACTIVITIES OF DAILY LIVING (ADL)
ADLS_ACUITY_SCORE: 35
ADLS_ACUITY_SCORE: 33

## 2023-02-20 NOTE — ED PROVIDER NOTES
[facility] EMERGENCY DEPARTMENT ([facility])    2/20/23      ED PROVIDER NOTE  ED 7   History     Chief Complaint   Patient presents with     Fall     Wrist Pain     HPI  Deya SHREYAS Mantilla is a 67 year old female with history of osteopenia who presents with right upper extremity pain after a mechanical slip and fall on ice. She fell on outstretched right hand, injuring her right wrist and arm. She endorses numbness and paresthesias in her right upper extremity, feels like her right arm is numb.     Past Medical History  Past Medical History:   Diagnosis Date     Allergic rhinitis, cause unspecified      Anorexia nervosa      Depressive disorder      Depressive disorder, not elsewhere classified      Disorder of bone and cartilage, unspecified      Migraine, unspecified, without mention of intractable migraine without mention of status migrainosus     with auras     Personal history of colonic polyps 8/2007     Pure hypercholesterolemia     diet/exercise     Past Surgical History:   Procedure Laterality Date     C DEXA, BONE DENSITY, AXIAL SKEL  10/05     COLONOSCOPY  8/2007    repeat 5 yr     COLONOSCOPY  8/21/2012    Procedure: COLONOSCOPY;  COLONOSCOPY;  Surgeon: Sang Gurrola MD;  Location:  GI     SURGICAL HISTORY OF -       Baker cyst removal L postauricular area     SURGICAL HISTORY OF -   5/06    Distal Radius Fracture Repair     acetaminophen-codeine (TYLENOL #3) 300-30 MG tablet  buPROPion (WELLBUTRIN SR) 100 MG 12 hr tablet  buPROPion (WELLBUTRIN XL) 300 MG 24 hr tablet  escitalopram (LEXAPRO) 10 MG tablet  ibuprofen (ADVIL/MOTRIN) 800 MG tablet  MULTIVITAMIN TABS   OR      Allergies   Allergen Reactions     Percocet [Oxycodone-Acetaminophen] Hives     Family History  Family History   Problem Relation Age of Onset     Hypertension Mother      Lipids Mother      Alzheimer Disease Mother      Hyperlipidemia Mother      Thyroid Disease Mother         hypothyroid     Prostate Cancer Father   "    Depression Father      Other Cancer Father      Osteoporosis Paternal Grandmother      Mental Illness Other      Thyroid Disease Cousin      Social History   Social History     Tobacco Use     Smoking status: Never     Smokeless tobacco: Never   Substance Use Topics     Alcohol use: No     Comment: Social, very rare     Drug use: No         A medically appropriate review of systems was performed with pertinent positives and negatives noted in the HPI, and all other systems negative.    Physical Exam   BP: 103/66  Pulse: 72  Temp: 98.2  F (36.8  C)  Resp: 18  Height: 167.6 cm (5' 6\")  Weight: 46.7 kg (103 lb)  SpO2: 97 %  Physical Exam  Constitutional:       General: She is not in acute distress.     Appearance: She is not diaphoretic.   HENT:      Head: Atraumatic.      Mouth/Throat:      Pharynx: No oropharyngeal exudate.   Eyes:      General: No scleral icterus.     Pupils: Pupils are equal, round, and reactive to light.   Cardiovascular:      Heart sounds: Normal heart sounds.   Pulmonary:      Effort: No respiratory distress.      Breath sounds: Normal breath sounds.   Abdominal:      General: Bowel sounds are normal.      Palpations: Abdomen is soft.      Tenderness: There is no abdominal tenderness.   Musculoskeletal:      Right wrist: Swelling, deformity, tenderness and bony tenderness present. Decreased range of motion.   Skin:     General: Skin is warm.      Findings: No rash.   Neurological:      General: No focal deficit present.      Mental Status: She is oriented to person, place, and time.      Sensory: No sensory deficit.      Motor: No weakness.      Coordination: Coordination normal.           ED Course, Procedures, & Data      Procedures  Patient underwent hematoma block with 5 cc of Marcaine and 5 cc of lidocaine with good result we were then able to hang using finger traps and 10 pound weight with reduction of her wrist fracture.  Patient tolerated the procedure well and the splint was then " placed.      Narrative:        Splint was applied and after placement I checked and adjusted the fit to    ensure proper positioning. Patient was more comfortable with splint in    place. Sensation and circulation are intact after splint placement.               Results for orders placed or performed during the hospital encounter of 02/20/23   XR Wrist Right G/E 3 Views     Status: None    Narrative    XR WRIST RIGHT G/E 3 VIEWS 2/20/2023 3:44 PM     HISTORY: Pain after fall    COMPARISON: None.         Impression    IMPRESSION: Comminuted fracture of the distal aspect of the right  radius with dorsal angulation of the distal fracture fragment and  minimal impaction. No additional fractures are identified.    JOVITA LARA MD         SYSTEM ID:  [id number]   XR Wrist Right G/E 3 Views     Status: None    Narrative    EXAM: XR WRIST RIGHT G/E 3 VIEWS  LOCATION: [facility]  DATE/TIME: 2/20/2023 5:10 PM    INDICATION: Postreduction, wrist pain.  COMPARISON: None.      Impression    IMPRESSION: Acute, comminuted, mildly displaced fracture of the distal radius without significant angulation of the fracture fragments.     Medications   ketorolac (TORADOL) injection 15 mg (15 mg Intravenous Given 2/20/23 1518)     Labs Ordered and Resulted from Time of ED Arrival to Time of ED Departure - No data to display  Results for orders placed or performed during the hospital encounter of 02/20/23   XR Wrist Right G/E 3 Views     Status: None    Narrative    XR WRIST RIGHT G/E 3 VIEWS 2/20/2023 3:44 PM     HISTORY: Pain after fall    COMPARISON: None.         Impression    IMPRESSION: Comminuted fracture of the distal aspect of the right  radius with dorsal angulation of the distal fracture fragment and  minimal impaction. No additional fractures are identified.    JOVITA LARA MD         SYSTEM ID:  [id number]   XR Wrist Right G/E 3 Views     Status: None    Narrative    EXAM: XR WRIST  RIGHT G/E 3 VIEWS  LOCATION: [facility]  DATE/TIME: 2/20/2023 5:10 PM    INDICATION: Postreduction, wrist pain.  COMPARISON: None.      Impression    IMPRESSION: Acute, comminuted, mildly displaced fracture of the distal radius without significant angulation of the fracture fragments.              Medical Decision Making  The patient's presentation was of moderate complexity (an acute complicated injury).    The patient's evaluation involved:  ordering and/or review of 2 test(s) in this encounter (see separate area of note for details)    The patient's management necessitated moderate risk (prescription drug management including medications given in the ED) and moderate risk (a decision regarding minor procedure (Fracture care with reduction) with risk factors of Nerve impingement compartment syndrome or difficulty with joint).      Assessment & Plan        I have reviewed the nursing notes. I have reviewed the findings, diagnosis, plan and need for follow up with the patient.    Discharge Medication List as of 2/20/2023  6:13 PM      START taking these medications    Details   acetaminophen-codeine (TYLENOL #3) 300-30 MG tablet Take 1 tablet by mouth every 6 hours as needed for severe pain (7-10), Disp-10 tablet, R-0, Local Print      ibuprofen (ADVIL/MOTRIN) 800 MG tablet Take 1 tablet (800 mg) by mouth every 8 hours as needed for moderate pain (4-6), Disp-15 tablet, R-0, Local Print             Final diagnoses:   Closed fracture of distal end of right radius, unspecified fracture morphology, initial encounter       Andrey Christianson  [facility] EMERGENCY DEPARTMENT  2/20/2023     Andrey Christianson MD  02/21/23 0355

## 2023-02-21 ENCOUNTER — TELEPHONE (OUTPATIENT)
Dept: ORTHOPEDICS | Facility: CLINIC | Age: 68
End: 2023-02-21
Payer: COMMERCIAL

## 2023-02-21 NOTE — DISCHARGE INSTRUCTIONS
Discharge to home with sling in place plan on following up with the orthopedic clinic in the next week return if increased pain or difficulty with numbness tingling swelling of the hand.

## 2023-02-21 NOTE — TELEPHONE ENCOUNTER
Writer called and scheduled pt for an appointment on 2/22/23 at 3:15 pm with Dr. Mehta for ED follow up. DOI 2/20/22 Closed fracture of distal end of right radius, unspecified fracture morphology.     Teri Kiser LPN

## 2023-02-21 NOTE — TELEPHONE ENCOUNTER
DIAGNOSIS: Closed fracture of distal end of right radius, unspecified fracture morphology   APPOINTMENT DATE: 02/22/2023   NOTES STATUS DETAILS   DISCHARGE REPORT from the ER Internal 02/20/2023 - [facility] ED   MEDICATION LIST Internal    XRAYS (IMAGES & REPORTS) PACS Internal:  02/20/2023 - RT Wrist

## 2023-02-21 NOTE — ED NOTES
AVS reviewed with the pt. Follow up care and when to return to the emergency room reviewed with the pt. Hard script meds sent home with the pt. Pt verbalized understanding of plan.

## 2023-02-22 ENCOUNTER — ANCILLARY PROCEDURE (OUTPATIENT)
Dept: GENERAL RADIOLOGY | Facility: CLINIC | Age: 68
End: 2023-02-22
Attending: STUDENT IN AN ORGANIZED HEALTH CARE EDUCATION/TRAINING PROGRAM
Payer: COMMERCIAL

## 2023-02-22 ENCOUNTER — OFFICE VISIT (OUTPATIENT)
Dept: ORTHOPEDICS | Facility: CLINIC | Age: 68
End: 2023-02-22
Payer: COMMERCIAL

## 2023-02-22 ENCOUNTER — PRE VISIT (OUTPATIENT)
Dept: ORTHOPEDICS | Facility: CLINIC | Age: 68
End: 2023-02-22

## 2023-02-22 DIAGNOSIS — M25.539 WRIST PAIN: ICD-10-CM

## 2023-02-22 DIAGNOSIS — S52.531A CLOSED COLLES' FRACTURE OF RIGHT RADIUS, INITIAL ENCOUNTER: Primary | ICD-10-CM

## 2023-02-22 DIAGNOSIS — M25.539 WRIST PAIN: Primary | ICD-10-CM

## 2023-02-22 PROCEDURE — 99204 OFFICE O/P NEW MOD 45 MIN: CPT | Mod: 25 | Performed by: STUDENT IN AN ORGANIZED HEALTH CARE EDUCATION/TRAINING PROGRAM

## 2023-02-22 PROCEDURE — 73110 X-RAY EXAM OF WRIST: CPT | Mod: RT | Performed by: RADIOLOGY

## 2023-02-22 PROCEDURE — 29126 APPL SHORT ARM SPLINT DYN: CPT | Mod: RT | Performed by: SPECIALIST/TECHNOLOGIST

## 2023-02-22 NOTE — PROGRESS NOTES
Cast/splint application    Date/Time: 2/22/2023 3:57 PM  Performed by: Chetan Recinos ATC  Authorized by: Dotty Mehta MD     Consent:     Consent obtained:  Verbal    Consent given by:  Patient    Risks discussed:  Discoloration, numbness, pain and swelling  Pre-procedure details:     Sensation:  Numbness  Procedure details:     Laterality:  Right    Location:  Wrist    Wrist:  R wrist    Strapping: no      Splint type:  Short arm (dynamic) (dorsal/volar)    Supplies:  Plaster  Post-procedure details:     Pain:  Unchanged    Pain level:  4/10    Sensation:  Numbness    Patient tolerance of procedure:  Tolerated well, no immediate complications (painful for patient)    Patient provided with cast or splint care instructions: Yes

## 2023-02-22 NOTE — LETTER
2/22/2023         RE: Deya Mantilla  [address]  [place] [address]        Dear Colleague,    Thank you for referring your patient, Deya Mantilla, to the [facility] [place]. Please see a copy of my visit note below.    Chief Complaint: No chief complaint on file.      Date of Injury: 2/20/2023  Mechanism of Injury: Fall  Diagnosis: Right distal radius fracture  Treatment: Closed reduction and splint placement    History of Present Illness: Deya Mantilla is a 67 year old RHD female presenting for evaluation of right distal radius fracture. She fell on ice. She had a closed reduction in the emergency department. She still has significant pain, and now has worsening of her finger swelling and bruising.    Clinical documentation by  on 2/20/2023 was reviewed.    Occupation: works at [employer]    Past Medical History:   Past Medical History:   Diagnosis Date     Allergic rhinitis, cause unspecified      Anorexia nervosa      Depressive disorder      Depressive disorder, not elsewhere classified      Disorder of bone and cartilage, unspecified      Migraine, unspecified, without mention of intractable migraine without mention of status migrainosus     with auras     Personal history of colonic polyps 8/2007     Pure hypercholesterolemia     diet/exercise       Past Surgical History:   Past Surgical History:   Procedure Laterality Date     C DEXA, BONE DENSITY, AXIAL SKEL  10/05     COLONOSCOPY  8/2007    repeat 5 yr     COLONOSCOPY  8/21/2012    Procedure: COLONOSCOPY;  COLONOSCOPY;  Surgeon: Sang Gurrola MD;  Location:  GI     SURGICAL HISTORY OF -       Baker cyst removal L postauricular area     SURGICAL HISTORY OF -   5/06    Distal Radius Fracture Repair       Medications:   Current Outpatient Medications:      acetaminophen-codeine (TYLENOL #3) 300-30 MG tablet, Take 1 tablet by mouth every 6 hours as needed for severe pain  (7-10), Disp: 10 tablet, Rfl: 0     buPROPion (WELLBUTRIN SR) 100 MG 12 hr tablet, Take 1 tablet (100 mg) by mouth 2 times daily, Disp: 180 tablet, Rfl: 3     buPROPion (WELLBUTRIN XL) 300 MG 24 hr tablet, Take 1 tablet (300 mg) by mouth every morning, Disp: 90 tablet, Rfl: 3     escitalopram (LEXAPRO) 10 MG tablet, Take 1 tablet (10 mg) by mouth daily, Disp: 90 tablet, Rfl: 3     ibuprofen (ADVIL/MOTRIN) 800 MG tablet, Take 1 tablet (800 mg) by mouth every 8 hours as needed for moderate pain (4-6), Disp: 15 tablet, Rfl: 0     MULTIVITAMIN TABS   OR, With extra calcium and Vitamin D, Disp: , Rfl:     Allergy:   Allergies   Allergen Reactions     Percocet [Oxycodone-Acetaminophen] Hives       Social History:   History   Smoking Status     Never   Smokeless Tobacco     Never      Social History     Tobacco Use     Smoking status: Never     Smokeless tobacco: Never   Substance Use Topics     Alcohol use: No     Comment: Social, very rare     Drug use: No      Family History:   Family History   Problem Relation Age of Onset     Hypertension Mother      Lipids Mother      Alzheimer Disease Mother      Hyperlipidemia Mother      Thyroid Disease Mother         hypothyroid     Prostate Cancer Father      Depression Father      Other Cancer Father      Osteoporosis Paternal Grandmother      Mental Illness Other      Thyroid Disease Cousin      Physical Examination:  There were no vitals filed for this visit.  There is no height or weight on file to calculate BMI.    Well appearing, well nourished  Alert and oriented x 3, cooperative with exam     Right wrist  Skin intact  Swelling and tenderness to palpation about distal radius  Motor Exam: Intact TU/OK/x2-3.  Sensory Exam: Sensation intact to light touch in FDWS (radial), volar IF (median), volar SF (ulnar)  Vascular Exam: 2+ radial pulse, < 2 sec capillary refill    Imaging/Studies:  XR (3 views) of the right wrist was obtained 2/22/2023.  I reviewed the images with the  patient.  The imaging study shows Comminuted extra-articular distal radius fracture with possible nondisplaced intra-articular split.  Dorsal angulation measuring about 13 degrees. Compared to postreduction films on 2/20/2023, there is an increase in dorsal angulation    Assessment: Deya Mantilla is a 67 year old female with right distal radius fracture.    Plan:   I had a detailed discussion with the patient regarding my clinical findings, diagnosis, and treatment plan. I reviewed the treatment options for her distal radius fracture (immobilization, CRPP, ORIF, etc.) as well as the risks and benefits of each.  The fracture is currently shortened and dorsally angulated, and there was evidence of worsening dorsal angulation in the 2 days since closed reduction in the emergency department, suggesting instability. Closed reduction was attempted in the clinic but the patient could not tolerate significant manipulation. Therefore, she elects surgical intervention. The risks of surgery include but are not limited to infection, bleeding, nerve injury, permanent numbness, malunion/nonunion, hardware failure, tendon irritation/rupture, post-operative stiffness, surgical scar, CRPS, anesthetic complications, etc.  We also discussed that there is a possibility that the surgery will not be successful and will require repeat surgery. We reviewed post-operative pain management and rehabilitation. The patient understands and agrees to the treatment plan.  All questions answered.       Plan for surgery in the form of right distal radius open reduction internal fixation.  - Continue NWB in splint     Next Visit:    Follow-up: 10-14 days after surgery.    Imaging: XR right wrist.    Plan: Review imaging.  Transition to removable wrist brace.      DOTTY COOPER MD    Cast/splint application    Date/Time: 2/22/2023 3:57 PM  Performed by: Chetan Recinos ATC  Authorized by: Dotty Cooper MD     Consent:     Consent  obtained:  Verbal    Consent given by:  Patient    Risks discussed:  Discoloration, numbness, pain and swelling  Pre-procedure details:     Sensation:  Numbness  Procedure details:     Laterality:  Right    Location:  Wrist    Wrist:  R wrist    Strapping: no      Splint type:  Short arm (dynamic) (dorsal/volar)    Supplies:  Plaster  Post-procedure details:     Pain:  Unchanged    Pain level:  4/10    Sensation:  Numbness    Patient tolerance of procedure:  Tolerated well, no immediate complications (painful for patient)    Patient provided with cast or splint care instructions: Yes

## 2023-02-22 NOTE — NURSING NOTE
Reason For Visit:   Chief Complaint   Patient presents with     Consult     Right distal radius fracture DOI:  2/20/23       Primary MD: Kavita Lombardo  Ref. MD: ED    Age: 67 year old    ?  No      LMP  (LMP Unknown)       Pain Assessment  Patient Currently in Pain: Yes  0-10 Pain Scale: 4  Primary Pain Location: Wrist (Right)  Pain Descriptors: Aching, Dull, Intermittent  Alleviating Factors: NSAIDS    Hand Dominance Evaluation  Hand Dominance: Right          QuickDASH Assessment  No flowsheet data found.       Current Outpatient Medications   Medication Sig Dispense Refill     acetaminophen-codeine (TYLENOL #3) 300-30 MG tablet Take 1 tablet by mouth every 6 hours as needed for severe pain (7-10) 10 tablet 0     buPROPion (WELLBUTRIN SR) 100 MG 12 hr tablet Take 1 tablet (100 mg) by mouth 2 times daily 180 tablet 3     buPROPion (WELLBUTRIN XL) 300 MG 24 hr tablet Take 1 tablet (300 mg) by mouth every morning 90 tablet 3     escitalopram (LEXAPRO) 10 MG tablet Take 1 tablet (10 mg) by mouth daily 90 tablet 3     ibuprofen (ADVIL/MOTRIN) 800 MG tablet Take 1 tablet (800 mg) by mouth every 8 hours as needed for moderate pain (4-6) 15 tablet 0     MULTIVITAMIN TABS   OR With extra calcium and Vitamin D         Allergies   Allergen Reactions     Percocet [Oxycodone-Acetaminophen] ASHLEY Castle, ATC

## 2023-02-22 NOTE — NURSING NOTE
Teaching Flowsheet   Relevant Diagnosis: Right distal radius fracture, DOI 2-20-23  Teaching Topic: ORIF Rt distal radius    CSC under MAC with Regional block anesthesia with Dr Dotty Mehta    Person(s) involved in teaching:   Patient and  ([place])     Motivation Level:  Asks Questions: Yes  Eager to Learn: Yes  Cooperative: Yes  Receptive (willing/able to accept information): Yes  Any cultural factors/Catholic beliefs that may influence understanding or compliance? No    Patient demonstrates understanding of the following:  Reason for the appointment, diagnosis and treatment plan: Yes  Knowledge of proper use of medications and conditions for which they are ordered (with special attention to potential side effects or drug interactions): Yes  Which situations necessitate calling provider and whom to contact: Yes    Teaching Concerns Addressed:   Proper use and care of  (medical equip, care aids, etc.): Yes  Nutritional needs and diet plan: Yes  Pain management techniques: Yes  Wound Care: Yes  How and/when to access community resources: Yes     Instructional Materials Used/Given:   Preoperative surgery packet, antibacterial Chlorhexidine soap. Stop Light Tool reviewed, patient verbalized understanding, had no immediate questions. Blaire Hylton RN

## 2023-02-22 NOTE — TELEPHONE ENCOUNTER
FUTURE VISIT INFORMATION      SURGERY INFORMATION:     Distal Rt Radius Fx, Dr Dotty Mehta, Surgery date TBD    RECORDS REQUESTED FROM:       Primary Care Provider: Kavita Lombardo, NP

## 2023-02-22 NOTE — H&P (VIEW-ONLY)
Chief Complaint: No chief complaint on file.      Date of Injury: 2/20/2023  Mechanism of Injury: Fall  Diagnosis: Right distal radius fracture  Treatment: Closed reduction and splint placement    History of Present Illness: Deya Mantilla is a 67 year old RHD female presenting for evaluation of right distal radius fracture. She fell on ice. She had a closed reduction in the emergency department. She still has significant pain, and now has worsening of her finger swelling and bruising.    Clinical documentation by  on 2/20/2023 was reviewed.    Occupation: works at [employer]    Past Medical History:   Past Medical History:   Diagnosis Date     Allergic rhinitis, cause unspecified      Anorexia nervosa      Depressive disorder      Depressive disorder, not elsewhere classified      Disorder of bone and cartilage, unspecified      Migraine, unspecified, without mention of intractable migraine without mention of status migrainosus     with auras     Personal history of colonic polyps 8/2007     Pure hypercholesterolemia     diet/exercise       Past Surgical History:   Past Surgical History:   Procedure Laterality Date     C DEXA, BONE DENSITY, AXIAL SKEL  10/05     COLONOSCOPY  8/2007    repeat 5 yr     COLONOSCOPY  8/21/2012    Procedure: COLONOSCOPY;  COLONOSCOPY;  Surgeon: Sang Gurrola MD;  Location:  GI     SURGICAL HISTORY OF -       Baker cyst removal L postauricular area     SURGICAL HISTORY OF -   5/06    Distal Radius Fracture Repair       Medications:   Current Outpatient Medications:      acetaminophen-codeine (TYLENOL #3) 300-30 MG tablet, Take 1 tablet by mouth every 6 hours as needed for severe pain (7-10), Disp: 10 tablet, Rfl: 0     buPROPion (WELLBUTRIN SR) 100 MG 12 hr tablet, Take 1 tablet (100 mg) by mouth 2 times daily, Disp: 180 tablet, Rfl: 3     buPROPion (WELLBUTRIN XL) 300 MG 24 hr tablet, Take 1 tablet (300 mg) by mouth every morning, Disp: 90 tablet, Rfl: 3      escitalopram (LEXAPRO) 10 MG tablet, Take 1 tablet (10 mg) by mouth daily, Disp: 90 tablet, Rfl: 3     ibuprofen (ADVIL/MOTRIN) 800 MG tablet, Take 1 tablet (800 mg) by mouth every 8 hours as needed for moderate pain (4-6), Disp: 15 tablet, Rfl: 0     MULTIVITAMIN TABS   OR, With extra calcium and Vitamin D, Disp: , Rfl:     Allergy:   Allergies   Allergen Reactions     Percocet [Oxycodone-Acetaminophen] Hives       Social History:   History   Smoking Status     Never   Smokeless Tobacco     Never      Social History     Tobacco Use     Smoking status: Never     Smokeless tobacco: Never   Substance Use Topics     Alcohol use: No     Comment: Social, very rare     Drug use: No        Family History:   Family History   Problem Relation Age of Onset     Hypertension Mother      Lipids Mother      Alzheimer Disease Mother      Hyperlipidemia Mother      Thyroid Disease Mother         hypothyroid     Prostate Cancer Father      Depression Father      Other Cancer Father      Osteoporosis Paternal Grandmother      Mental Illness Other      Thyroid Disease Cousin        Physical Examination:  There were no vitals filed for this visit.  There is no height or weight on file to calculate BMI.    Well appearing, well nourished  Alert and oriented x 3, cooperative with exam     Right wrist  Skin intact  Swelling and tenderness to palpation about distal radius  Motor Exam: Intact TU/OK/x2-3.  Sensory Exam: Sensation intact to light touch in FDWS (radial), volar IF (median), volar SF (ulnar)  Vascular Exam: 2+ radial pulse, < 2 sec capillary refill    Imaging/Studies:  XR (3 views) of the right wrist was obtained 2/22/2023.  I reviewed the images with the patient.  The imaging study shows Comminuted extra-articular distal radius fracture with possible nondisplaced intra-articular split.  Dorsal angulation measuring about 13 degrees. Compared to postreduction films on 2/20/2023, there is an increase in dorsal  angulation    Assessment: Deya Mantilla is a 67 year old female with right distal radius fracture.    Plan:   I had a detailed discussion with the patient regarding my clinical findings, diagnosis, and treatment plan. I reviewed the treatment options for her distal radius fracture (immobilization, CRPP, ORIF, etc.) as well as the risks and benefits of each.  The fracture is currently shortened and dorsally angulated, and there was evidence of worsening dorsal angulation in the 2 days since closed reduction in the emergency department, suggesting instability. Closed reduction was attempted in the clinic but the patient could not tolerate significant manipulation. Therefore, she elects surgical intervention. The risks of surgery include but are not limited to infection, bleeding, nerve injury, permanent numbness, malunion/nonunion, hardware failure, tendon irritation/rupture, post-operative stiffness, surgical scar, CRPS, anesthetic complications, etc.  We also discussed that there is a possibility that the surgery will not be successful and will require repeat surgery. We reviewed post-operative pain management and rehabilitation. The patient understands and agrees to the treatment plan.  All questions answered.       Plan for surgery in the form of right distal radius open reduction internal fixation.  - Continue NWB in splint     Next Visit:    Follow-up: 10-14 days after surgery.    Imaging: XR right wrist.    Plan: Review imaging.  Transition to removable wrist brace.      FERDINAND COOPER MD

## 2023-02-22 NOTE — PROGRESS NOTES
Chief Complaint: No chief complaint on file.      Date of Injury: 2/20/2023  Mechanism of Injury: Fall  Diagnosis: Right distal radius fracture  Treatment: Closed reduction and splint placement    History of Present Illness: Deya Mantilla is a 67 year old RHD female presenting for evaluation of right distal radius fracture. She fell on ice. She had a closed reduction in the emergency department. She still has significant pain, and now has worsening of her finger swelling and bruising.    Clinical documentation by  on 2/20/2023 was reviewed.    Occupation: works at [employer]    Past Medical History:   Past Medical History:   Diagnosis Date     Allergic rhinitis, cause unspecified      Anorexia nervosa      Depressive disorder      Depressive disorder, not elsewhere classified      Disorder of bone and cartilage, unspecified      Migraine, unspecified, without mention of intractable migraine without mention of status migrainosus     with auras     Personal history of colonic polyps 8/2007     Pure hypercholesterolemia     diet/exercise       Past Surgical History:   Past Surgical History:   Procedure Laterality Date     C DEXA, BONE DENSITY, AXIAL SKEL  10/05     COLONOSCOPY  8/2007    repeat 5 yr     COLONOSCOPY  8/21/2012    Procedure: COLONOSCOPY;  COLONOSCOPY;  Surgeon: Sang Gurrola MD;  Location:  GI     SURGICAL HISTORY OF -       Baker cyst removal L postauricular area     SURGICAL HISTORY OF -   5/06    Distal Radius Fracture Repair       Medications:   Current Outpatient Medications:      acetaminophen-codeine (TYLENOL #3) 300-30 MG tablet, Take 1 tablet by mouth every 6 hours as needed for severe pain (7-10), Disp: 10 tablet, Rfl: 0     buPROPion (WELLBUTRIN SR) 100 MG 12 hr tablet, Take 1 tablet (100 mg) by mouth 2 times daily, Disp: 180 tablet, Rfl: 3     buPROPion (WELLBUTRIN XL) 300 MG 24 hr tablet, Take 1 tablet (300 mg) by mouth every morning, Disp: 90 tablet, Rfl: 3      escitalopram (LEXAPRO) 10 MG tablet, Take 1 tablet (10 mg) by mouth daily, Disp: 90 tablet, Rfl: 3     ibuprofen (ADVIL/MOTRIN) 800 MG tablet, Take 1 tablet (800 mg) by mouth every 8 hours as needed for moderate pain (4-6), Disp: 15 tablet, Rfl: 0     MULTIVITAMIN TABS   OR, With extra calcium and Vitamin D, Disp: , Rfl:     Allergy:   Allergies   Allergen Reactions     Percocet [Oxycodone-Acetaminophen] Hives       Social History:   History   Smoking Status     Never   Smokeless Tobacco     Never      Social History     Tobacco Use     Smoking status: Never     Smokeless tobacco: Never   Substance Use Topics     Alcohol use: No     Comment: Social, very rare     Drug use: No        Family History:   Family History   Problem Relation Age of Onset     Hypertension Mother      Lipids Mother      Alzheimer Disease Mother      Hyperlipidemia Mother      Thyroid Disease Mother         hypothyroid     Prostate Cancer Father      Depression Father      Other Cancer Father      Osteoporosis Paternal Grandmother      Mental Illness Other      Thyroid Disease Cousin        Physical Examination:  There were no vitals filed for this visit.  There is no height or weight on file to calculate BMI.    Well appearing, well nourished  Alert and oriented x 3, cooperative with exam     Right wrist  Skin intact  Swelling and tenderness to palpation about distal radius  Motor Exam: Intact TU/OK/x2-3.  Sensory Exam: Sensation intact to light touch in FDWS (radial), volar IF (median), volar SF (ulnar)  Vascular Exam: 2+ radial pulse, < 2 sec capillary refill    Imaging/Studies:  XR (3 views) of the right wrist was obtained 2/22/2023.  I reviewed the images with the patient.  The imaging study shows Comminuted extra-articular distal radius fracture with possible nondisplaced intra-articular split.  Dorsal angulation measuring about 13 degrees. Compared to postreduction films on 2/20/2023, there is an increase in dorsal  angulation    Assessment: Deya Mantilla is a 67 year old female with right distal radius fracture.    Plan:   I had a detailed discussion with the patient regarding my clinical findings, diagnosis, and treatment plan. I reviewed the treatment options for her distal radius fracture (immobilization, CRPP, ORIF, etc.) as well as the risks and benefits of each.  The fracture is currently shortened and dorsally angulated, and there was evidence of worsening dorsal angulation in the 2 days since closed reduction in the emergency department, suggesting instability. Closed reduction was attempted in the clinic but the patient could not tolerate significant manipulation. Therefore, she elects surgical intervention. The risks of surgery include but are not limited to infection, bleeding, nerve injury, permanent numbness, malunion/nonunion, hardware failure, tendon irritation/rupture, post-operative stiffness, surgical scar, CRPS, anesthetic complications, etc.  We also discussed that there is a possibility that the surgery will not be successful and will require repeat surgery. We reviewed post-operative pain management and rehabilitation. The patient understands and agrees to the treatment plan.  All questions answered.       Plan for surgery in the form of right distal radius open reduction internal fixation.  - Continue NWB in splint     Next Visit:    Follow-up: 10-14 days after surgery.    Imaging: XR right wrist.    Plan: Review imaging.  Transition to removable wrist brace.      FERDINAND COOPER MD

## 2023-02-23 ENCOUNTER — TELEPHONE (OUTPATIENT)
Dept: ORTHOPEDICS | Facility: CLINIC | Age: 68
End: 2023-02-23

## 2023-02-23 ENCOUNTER — VIRTUAL VISIT (OUTPATIENT)
Dept: SURGERY | Facility: CLINIC | Age: 68
End: 2023-02-23
Payer: COMMERCIAL

## 2023-02-23 ENCOUNTER — PRE VISIT (OUTPATIENT)
Dept: SURGERY | Facility: CLINIC | Age: 68
End: 2023-02-23

## 2023-02-23 ENCOUNTER — ANESTHESIA EVENT (OUTPATIENT)
Dept: SURGERY | Facility: CLINIC | Age: 68
End: 2023-02-23

## 2023-02-23 DIAGNOSIS — Z01.818 PRE-OP EXAMINATION: Primary | ICD-10-CM

## 2023-02-23 DIAGNOSIS — M25.531 RIGHT WRIST PAIN: ICD-10-CM

## 2023-02-23 PROCEDURE — 99203 OFFICE O/P NEW LOW 30 MIN: CPT | Mod: 24 | Performed by: PHYSICIAN ASSISTANT

## 2023-02-23 ASSESSMENT — LIFESTYLE VARIABLES: TOBACCO_USE: 0

## 2023-02-23 ASSESSMENT — ENCOUNTER SYMPTOMS: SEIZURES: 0

## 2023-02-23 NOTE — TELEPHONE ENCOUNTER
Patient was seen in clinic yesterday for Rt distal radius fracture. She called RN to see when her fracture repair will be with Dr Mehta.  She needs to let her spouse know too so he can take work off.  Advised her that we will inform surgery schedulers that she is calling.  Blaire Hylton RN

## 2023-02-23 NOTE — H&P
Pre-Operative H & P     CC:  Preoperative exam to assess for increased cardiopulmonary risk while undergoing surgery and anesthesia.    Date of Encounter: 2/23/2023  Primary Care Physician:  Kavita Lombardo     Reason for visit:   Encounter Diagnoses   Name Primary?     Pre-op examination Yes     Right wrist pain        HPI  Deya Mantilla is a 67 year old female who presents for pre-operative H & P in preparation for  Procedure Information     Date/Time: TBD     Procedure: OPEN REDUCTION INTERNAL FIXATION, FRACTURE, WRIST    Anesthesia type: Choice with block     Pre-op diagnosis: wrist pain     Location: [facility]    Providers: Dr. Mehta           The patient is a 67-year-old woman with a past medical history significant for hyperlipidemia, seasonal allergies, migraines, history of anorexia, underweight, osteopenia, anxiety and depression.  The patient presented to the ER on 2/20/2023 after falling on the ice and injuring her right wrist and arm.  She was found to have a commuted fracture of the distal aspect of the right radius and dorsal angulation of the distal fracture fragments with minimal impaction.  She was placed in a splint and seen by Dr. Mehta on 2/22/2023.  The patient has been counseled to undergo the following procedure as above.     History is obtained from the patient, patient's  and chart review    Hx of abnormal bleeding or anti-platelet use: none    Menstrual history: No LMP recorded (lmp unknown). Patient is postmenopausal.:      Past Medical History  Past Medical History:   Diagnosis Date     Allergic rhinitis, cause unspecified      Anorexia nervosa      Depressive disorder, not elsewhere classified      Disorder of bone and cartilage, unspecified      Migraine, unspecified, without mention of intractable migraine without mention of status migrainosus     with auras     Osteopenia      Personal history of colonic polyps 08/2007     Pure  hypercholesterolemia     diet/exercise       Past Surgical History  Past Surgical History:   Procedure Laterality Date     C DEXA, BONE DENSITY, AXIAL SKEL  10/2005     COLONOSCOPY  08/2007    repeat 5 yr     COLONOSCOPY  08/21/2012    Procedure: COLONOSCOPY;  COLONOSCOPY;  Surgeon: Sang Gurrola MD;  Location:  GI     SURGICAL HISTORY OF -   1989    Baker cyst removal L postauricular area     SURGICAL HISTORY OF -   05/2006    Distal Radius Fracture Repair       Prior to Admission Medications  Current Outpatient Medications   Medication Sig Dispense Refill     buPROPion (WELLBUTRIN SR) 100 MG 12 hr tablet Take 1 tablet (100 mg) by mouth 2 times daily 180 tablet 3     escitalopram (LEXAPRO) 10 MG tablet Take 1 tablet (10 mg) by mouth daily (Patient taking differently: Take 10 mg by mouth every morning) 90 tablet 3     ibuprofen (ADVIL/MOTRIN) 800 MG tablet Take 1 tablet (800 mg) by mouth every 8 hours as needed for moderate pain (4-6) 15 tablet 0     MULTIVITAMIN TABS   OR Take by mouth daily (with lunch) With extra calcium and Vitamin D       buPROPion (WELLBUTRIN XL) 300 MG 24 hr tablet Take 1 tablet (300 mg) by mouth every morning (Patient taking differently: Take 300 mg by mouth every morning) 90 tablet 3       Allergies  Allergies   Allergen Reactions     Percocet [Oxycodone-Acetaminophen] Hives       Social History  Social History     Socioeconomic History     Marital status:      Spouse name: Not on file     Number of children: Not on file     Years of education: Not on file     Highest education level: Not on file   Occupational History     Not on file   Tobacco Use     Smoking status: Never     Smokeless tobacco: Never   Vaping Use     Vaping Use: Not on file   Substance and Sexual Activity     Alcohol use: No     Comment: Social, very rare     Drug use: No     Sexual activity: Not Currently     Partners: Male     Birth control/protection: Post-menopausal, None   Other Topics Concern      Parent/sibling w/ CABG, MI or angioplasty before 65F 55M? No   Social History Narrative    Dairy/d 3-4 servings/d    Caffeine 2 servings/d    Exercise 6-7 x week - walk 45 minutes    Sunscreen used - Yes    Seatbelts used - Yes    Working smoke/CO detectors in the home - Yes    Guns stored in the home - No    Self Breast Exams - no    Self Testicular Exam - NA    Eye Exam up to date - Yes     Dental Exam up to date - Yes     Pap Smear up to date - no    Mammogram up to date - Yes     PSA up to date - NA    Dexa Scan up to date - 2005    Flex Sig / Colonoscopy up to date - 8-07  Return in 5 yrs    Immunizations up to date - Td 9-04    Abuse: Current or Past(Physical, Sexual or Emotional)- No    Do you feel safe in your environment - Yes    10/09     Social Determinants of Health     Financial Resource Strain: Not on file   Food Insecurity: Not on file   Transportation Needs: Not on file   Physical Activity: Not on file   Stress: Not on file   Social Connections: Not on file   Intimate Partner Violence: Not on file   Housing Stability: Not on file       Family History  Family History   Problem Relation Age of Onset     Hypertension Mother      Lipids Mother      Alzheimer Disease Mother      Hyperlipidemia Mother      Thyroid Disease Mother         hypothyroid     Prostate Cancer Father      Depression Father      Other Cancer Father      Cerebrovascular Disease Father      Osteoporosis Paternal Grandmother      Thyroid Disease Cousin      Mental Illness Other      Anesthesia Reaction No family hx of      Venous thrombosis No family hx of        Review of Systems  The complete review of systems is negative other than noted in the HPI or here.   Anesthesia Evaluation   Pt has had prior anesthetic. Type: General and Regional.    History of anesthetic complications  - motion sickness and PONV.      ROS/MED HX  ENT/Pulmonary:     (+) allergic rhinitis,  (-) tobacco use   Neurologic:     (+) migraines,  (-) no seizures, no  CVA and no TIA   Cardiovascular:     (+) Dyslipidemia -----    METS/Exercise Tolerance: 4 - Raking leaves, gardening    Hematologic:  - neg hematologic  ROS     Musculoskeletal:   (+) fracture, Fracture location: RUE,     GI/Hepatic:  - neg GI/hepatic ROS  (-) GERD   Renal/Genitourinary:  - neg Renal ROS     Endo: Comment: Osteopenia     Underweight (history of anorexia)       Psychiatric/Substance Use:     (+) psychiatric history anxiety and depression     Infectious Disease:  - neg infectious disease ROS     Malignancy:  - neg malignancy ROS     Other:  - neg other ROS          Virtual visit -  No vitals were obtained    Physical Exam  Constitutional: Awake, alert, cooperative, no apparent distress, and appears stated age.  Eyes: Pupils equal  HENT: Normocephalic  Respiratory: non labored breathing   Neurologic: Awake, alert, oriented to name, place and time.   Neuropsychiatric: Calm, cooperative. Normal affect.      Prior Labs/Diagnostic Studies   All labs and imaging personally reviewed    Latest Reference Range & Units 11/16/22 08:58   Sodium 136 - 145 mmol/L 137   Potassium 3.4 - 5.3 mmol/L 4.1   Chloride 98 - 107 mmol/L 101   Carbon Dioxide (CO2) 22 - 29 mmol/L 22   Urea Nitrogen 8.0 - 23.0 mg/dL 18.1   Creatinine 0.51 - 0.95 mg/dL 0.80   GFR Estimate >60 mL/min/1.73m2 81   Calcium 8.8 - 10.2 mg/dL 9.1   Anion Gap 7 - 15 mmol/L 14   Cholesterol <200 mg/dL 229 (H)   Glucose 70 - 99 mg/dL 84   HDL Cholesterol >=50 mg/dL 85   LDL Cholesterol Calculated <=100 mg/dL 132 (H)   Non HDL Cholesterol <130 mg/dL 144 (H)   Triglycerides <150 mg/dL 58      Latest Reference Range & Units 10/20/20 09:45   WBC 4.0 - 11.0 10e9/L 5.0   Hemoglobin 11.7 - 15.7 g/dL 13.0   Hematocrit 35.0 - 47.0 % 41.9   Platelet Count 150 - 450 10e9/L 243   RBC Count 3.8 - 5.2 10e12/L 4.56   MCV 78 - 100 fl 92   MCH 26.5 - 33.0 pg 28.5   MCHC 31.5 - 36.5 g/dL 31.0 (L)   RDW 10.0 - 15.0 % 13.2   (L): Data is abnormally low    EKG/ stress test -  "if available please see in ROS above   No results found.  No flowsheet data found.      The patient's records and results personally reviewed by this provider.     Outside records reviewed from: Care Everywhere      Assessment      Deya Mantilla is a 67 year old female seen as a PAC referral for risk assessment and optimization for anesthesia.    Plan/Recommendations  Pt will be optimized for the proposed procedure.  See below for details on the assessment, risk, and preoperative recommendations    NEUROLOGY  - No history of TIA, CVA or seizure   ~ Migraines with aura - has not been as much of an issues since menopause   -Post Op delirium risk factors:  No risk identified    ENT  - No current airway concerns.  Will need to be reassessed day of surgery.  Mallampati: Unable to assess  TM: Unable to assess    CARDIAC  - Hyperlipidemia  diet and exercise controlled. Has remained stable and though possibly  to be familial     - METS (Metabolic Equivalents)  Patient performs 4 or more METS exercise without symptoms            Total Score: 0      RCRI-Very low risk: Class 1 0.4% complication rate            Total Score: 0        PULMONARY  - Obstructive Sleep Apnea  No current risk of obstructive sleep apnea   LAZARO Low Risk            Total Score: 1    LAZARO: Over 50 ys old      - Allergies - more in the Spring with humidity causing itching   ~ The patient reports strong smell cause her to have coughing fits.   - Tobacco History      History   Smoking Status     Never   Smokeless Tobacco     Never       GI  PONV High Risk  Total Score: 3           1 AN PONV: Pt is Female    1 AN PONV: Patient is not a current smoker    1 AN PONV: Intended Post Op Opioids        /RENAL  - Baseline Creatinine  0.80    ENDOCRINE    - BMI: Estimated body mass index is 16.62 kg/m  as calculated from the following:    Height as of 2/20/23: 1.676 m (5' 6\").    Weight as of 2/20/23: 46.7 kg (103 lb).  Underweight (BMI < 18.5) (history of " anorexia now resolved)  - osteopenia     HEME  VTE Low Risk 0.26%            Total Score: 1    VTE: Greater than 59 yrs old      - No history of abnormal bleeding or antiplatelet use.      MSK  ~ distal radius fracture - procedure as above.   ~ Prior left arm fracture.     PSYCH  - anxiety, depression - continue medications. Patient report symptoms slightly increase with this new arm fracture.     ANESTHESIA  ~ The patient reports with percocet with her baker cyst surgery she had a reaction to percocet with hives. She otherwise hasn't had an issues with anesthesia. The patient does note PONV, motion sickness. She also reports often pain causes her to feel dizzy and nauseated.       Different anesthesia methods/types have been discussed with the patient, but they are aware that the final plan will be decided by the assigned anesthesia provider on the date of service.    The patient is optimized for their procedure. AVS with information on surgery time/arrival time, meds and NPO status given by nursing staff. No further diagnostic testing indicated.    Please refer to the physical examination documented by the anesthesiologist in the anesthesia record on the day of surgery.    Video-Visit Details    Type of service:  Video Visit    Provider received verbal consent for a Video Visit from the patient? Yes   Video Start Time: 1:06  Video End Time:1:19    Originating Location (pt. Location): Home    Distant Location (provider location):  Off-site  Mode of Communication:  Video Conference via Glasses Direct  On the day of service:     Prep time: 5 minutes  Visit time: 14 minutes  Documentation time: 9 minutes  ------------------------------------------  Total time: 28 minutes      Fior Sommer PA-C  Preoperative Assessment Center  [facility]  Phone: [phone]  Fax: [phone]

## 2023-02-23 NOTE — PROGRESS NOTES
Deya Reinoso is a 67 year old who is being evaluated via a billable video visit.      How would you like to obtain your AVS? MyChart    Subjective   Deya Reinoso is a 67 year old, presenting for the following health issues:  Pre-Op Exam      HPI         Review of Systems       Physical Exam       SIMONA West LPN

## 2023-02-23 NOTE — PATIENT INSTRUCTIONS
Preparing for Your Surgery      Name:  Deya Mantilla   MRN:  [id number]   :  1955   Today's Date:  2023       Arriving for surgery:  Surgery date:  To be determined  Arrival time:  You will be called 1-2 days prior to your surgery with your times.     Surgeries and procedures: Adult patients can have 2 visitors all through the surgery process.     Visiting hours: 8 a.m. to 8:30 p.m.     Hospital: Adult patients and children under age 18 can have 4 visitor at a time     No visitors under the age of 5 are allowed for hospital patients.  Double occupancy rooms: Patients can have only two visitors at a time.     Patients with disabilities: Can have a support person with them (family member, service provider     Or someone well informed about their needs) plus the allowed number of visitors     Patients confirmed or suspected to have symptoms of COVID 19 or flu:     No visitors allowed for adult patients.   Children (under age 18) can have 1 named visitor.     People who are sick or showing symptoms of COVID 19 or flu:    Are not allowed to visit patients--we can only make exceptions in special situations.       Please follow these guidelines for your visit:   Arrive wearing a mask over your mouth and nose; we will give you a medical mask to wear    If you arrive wearing a cloth mask.   Keep it on during your entire visit, even when in patient's room.   If you don't wear a mask we'll ask you to leave.     Clean your hands with alcohol hand . Do this when you arrive at and leave the building and patient room,    And again after you touch your mask or anything in the room.     You can t visit if you have a fever, cough, shortness of breath, muscle aches, headaches, sore throat    Or diarrhea      Stay 6 feet away from others during your visit and between visits     Go directly to and from the room you are visiting.     Stay in the patient s room during your visit. Limit going to other places  in the hospital as much as possible     Leave bags and jackets at home or in the car.     For everyone s health, please don t come and go during your visit. That includes for smoking   during your visit.     Please come to:     To be determined    What can I eat or drink?  -  You may eat and drink normally up to 8 hours prior to arrival time.   -  You may have clear liquids until 2 hours prior to arrival time.     Examples of clear liquids:  Water  Clear broth  Juices (apple, white grape, white cranberry  and cider) without pulp  Noncarbonated, powder based beverages  (lemonade and Praveen-Aid)  Sodas (Sprite, 7-Up, ginger ale and seltzer)  Coffee or tea (without milk or cream)  Gatorade    -  No Alcohol for at least 24 hours before surgery.     Which medicines can I take?    Hold Aspirin for 7 days before surgery.   Hold Multivitamins for 7 days before surgery.  Hold Supplements for 7 days before surgery.  Hold Ibuprofen (Advil, Motrin) for 1 day before surgery--unless otherwise directed by surgeon.  Hold Naproxen (Aleve) for 4 days before surgery.    Hold all NSAIDS for 7 days before spine surgery. (Ibuprofen, Naproxen, Celebrex, Indocin, Diclofenac.)    -  PLEASE TAKE these medications the day of surgery:  Tylenol if needed, Bupropion (Wellbutrin XL), Escitalopram (Lexapro)    How do I prepare myself?  - Please take 2 showers (one the night prior to surgery and one the morning of surgery) using Scrubcare or Hibiclens soap.    Use this soap only from the neck to your toes.     Leave the soap on your skin for one minute--then rinse thoroughly.      You may use your own shampoo and conditioner. No other hair products.   - Please remove all jewelry and body piercings.  - No lotions, deodorants or fragrance.  - No makeup or fingernail polish.   - Bring your ID and insurance card.    -If you have a Deep Brain Stimulator, Spinal Cord Stimulator, or any Neuro Stimulator device---you must bring the remote control to the  hospital.      ALL PATIENTS GOING HOME THE SAME DAY OF SURGERY ARE REQUIRED TO HAVE A RESPONSIBLE ADULT TO DRIVE AND BE IN ATTENDANCE WITH THEM FOR 24 HOURS FOLLOWING SURGERY.    Covid testing policy as of 12/06/2022  Your surgeon will notify and schedule you for a COVID test if one is needed before surgery--please direct any questions or COVID symptoms to your surgeon      Questions or Concerns:    - For any questions regarding the day of surgery or your hospital stay, please contact the Pre Admission Nursing Office at [phone].       - If you have health changes between today and your surgery, please call your surgeon.       - For questions after surgery, please call your surgeons office.

## 2023-02-23 NOTE — OR NURSING
FYI - Pt states she does have balance issues and becomes dizzy when she moves backwards.  She does not use any assistive device and denies any falls in past six months.

## 2023-02-24 ENCOUNTER — TELEPHONE (OUTPATIENT)
Dept: ORTHOPEDICS | Facility: CLINIC | Age: 68
End: 2023-02-24
Payer: COMMERCIAL

## 2023-02-24 PROBLEM — M25.539 WRIST PAIN: Status: ACTIVE | Noted: 2023-02-24

## 2023-02-24 NOTE — TELEPHONE ENCOUNTER
Patient is scheduled for surgery with Dr. Mehta    Spoke with: Abby (spouse)    Date of Surgery: 3/2/23    Location: [facility] OR     Informed patient they will need an adult  Yes    Pre op with Provider PAC    Pre-procedure COVID-19 Test: N/A    Additional imaging/appointments: POP Made    Surgery packet: Received     Additional comments: N/A

## 2023-02-24 NOTE — TELEPHONE ENCOUNTER
[facility] Call Center    Phone Message    May a detailed message be left on voicemail: yes     Reason for Call: Other: pt calling asking about date and time that surgery for right wrist ORIF with Dr. Mehta.  Pt would like call back at [phone]     Action Taken: Message routed to:  [facility]:  Dr. Dotty Mehta     Travel Screening: Not Applicable

## 2023-03-01 ENCOUNTER — ANESTHESIA EVENT (OUTPATIENT)
Dept: SURGERY | Facility: CLINIC | Age: 68
End: 2023-03-01
Payer: COMMERCIAL

## 2023-03-01 RX ORDER — HYDROMORPHONE HYDROCHLORIDE 1 MG/ML
0.2 INJECTION, SOLUTION INTRAMUSCULAR; INTRAVENOUS; SUBCUTANEOUS EVERY 5 MIN PRN
Status: CANCELLED | OUTPATIENT
Start: 2023-03-01

## 2023-03-01 RX ORDER — HYDROMORPHONE HYDROCHLORIDE 1 MG/ML
0.4 INJECTION, SOLUTION INTRAMUSCULAR; INTRAVENOUS; SUBCUTANEOUS EVERY 5 MIN PRN
Status: CANCELLED | OUTPATIENT
Start: 2023-03-01

## 2023-03-01 ASSESSMENT — ENCOUNTER SYMPTOMS: SEIZURES: 0

## 2023-03-01 ASSESSMENT — LIFESTYLE VARIABLES: TOBACCO_USE: 0

## 2023-03-01 NOTE — ANESTHESIA PREPROCEDURE EVALUATION
Anesthesia Pre-Procedure Evaluation    Patient: Deya Mantilla   MRN: [id number] : 1955        Procedure : Procedure(s):  OPEN REDUCTION INTERNAL FIXATION, FRACTURE, Distal Radius WRIST          Past Medical History:   Diagnosis Date     Allergic rhinitis, cause unspecified      Anorexia nervosa      Depressive disorder, not elsewhere classified      Disorder of bone and cartilage, unspecified      Migraine, unspecified, without mention of intractable migraine without mention of status migrainosus     with auras     Osteopenia      Personal history of colonic polyps 2007     Pure hypercholesterolemia     diet/exercise      Past Surgical History:   Procedure Laterality Date     C DEXA, BONE DENSITY, AXIAL SKEL  10/2005     COLONOSCOPY  2007    repeat 5 yr     COLONOSCOPY  2012    Procedure: COLONOSCOPY;  COLONOSCOPY;  Surgeon: Sang Gurrola MD;  Location:  GI     SURGICAL HISTORY OF -       Baker cyst removal L postauricular area     SURGICAL HISTORY OF -   2006    Distal Radius Fracture Repair      Allergies   Allergen Reactions     Percocet [Oxycodone-Acetaminophen] Hives      Social History     Tobacco Use     Smoking status: Never     Smokeless tobacco: Never   Substance Use Topics     Alcohol use: No     Comment: Social, very rare      Wt Readings from Last 1 Encounters:   23 46.7 kg (103 lb)        Anesthesia Evaluation   Pt has had prior anesthetic. Type: General and Regional.    History of anesthetic complications  - motion sickness and PONV.      ROS/MED HX  ENT/Pulmonary:     (+) allergic rhinitis,  (-) tobacco use   Neurologic:     (+) migraines,  (-) no seizures, no CVA and no TIA   Cardiovascular:     (+) Dyslipidemia -----    METS/Exercise Tolerance: 4 - Raking leaves, gardening    Hematologic:  - neg hematologic  ROS     Musculoskeletal:   (+) fracture, Fracture location: RUE,     GI/Hepatic:  - neg GI/hepatic ROS  (-) GERD   Renal/Genitourinary:  - neg  Renal ROS     Endo: Comment: Osteopenia     Underweight (history of anorexia)       Psychiatric/Substance Use:     (+) psychiatric history anxiety and depression     Infectious Disease:  - neg infectious disease ROS     Malignancy:  - neg malignancy ROS     Other:  - neg other ROS          Physical Exam    Airway        Mallampati: II   TM distance: > 3 FB   Neck ROM: full   Mouth opening: > 3 cm    Respiratory Devices and Support         Dental           Cardiovascular   cardiovascular exam normal       Rhythm and rate: regular and normal     Pulmonary   pulmonary exam normal        breath sounds clear to auscultation           OUTSIDE LABS:  CBC:   Lab Results   Component Value Date    WBC 5.0 10/20/2020    WBC 4.8 08/31/2016    HGB 13.0 10/20/2020    HGB 12.5 08/31/2016    HCT 41.9 10/20/2020    HCT 38.9 08/31/2016     10/20/2020     08/31/2016     BMP:   Lab Results   Component Value Date     11/16/2022     10/14/2019    POTASSIUM 4.1 11/16/2022    POTASSIUM 4.6 10/14/2019    CHLORIDE 101 11/16/2022    CHLORIDE 104 10/14/2019    CO2 22 11/16/2022    CO2 27 10/14/2019    BUN 18.1 11/16/2022    BUN 19 10/14/2019    CR 0.80 11/16/2022    CR 0.71 10/14/2019    GLC 84 11/16/2022    GLC 88 10/14/2019     COAGS: No results found for: PTT, INR, FIBR  POC: No results found for: BGM, HCG, HCGS  HEPATIC:   Lab Results   Component Value Date    ALBUMIN 3.8 01/13/2015    PROTTOTAL 6.9 01/13/2015    ALT 18 01/13/2015    AST 12 01/13/2015    ALKPHOS 53 01/13/2015    BILITOTAL 0.3 01/13/2015     OTHER:   Lab Results   Component Value Date    NATHANIEL 9.1 11/16/2022    TSH 2.30 10/20/2020       Anesthesia Plan    ASA Status:  2      Anesthesia Type: General.     - Airway: LMA   Induction: Intravenous.   Maintenance: Balanced.        Consents    Anesthesia Plan(s) and associated risks, benefits, and realistic alternatives discussed. Questions answered and patient/representative(s) expressed understanding.      - Discussed: Risks, Benefits and Alternatives for the PROCEDURE were discussed     - Discussed with:  Patient         Postoperative Care    Pain management: Multi-modal analgesia, Peripheral nerve block (Single Shot).   PONV prophylaxis: Ondansetron (or other 5HT-3), Dexamethasone or Solumedrol, Background Propofol Infusion     Comments:                Rambo Stovall MD

## 2023-03-02 ENCOUNTER — ANESTHESIA (OUTPATIENT)
Dept: SURGERY | Facility: CLINIC | Age: 68
End: 2023-03-02
Payer: COMMERCIAL

## 2023-03-02 ENCOUNTER — APPOINTMENT (OUTPATIENT)
Dept: GENERAL RADIOLOGY | Facility: CLINIC | Age: 68
End: 2023-03-02
Attending: STUDENT IN AN ORGANIZED HEALTH CARE EDUCATION/TRAINING PROGRAM
Payer: COMMERCIAL

## 2023-03-02 ENCOUNTER — HOSPITAL ENCOUNTER (OUTPATIENT)
Facility: CLINIC | Age: 68
Discharge: HOME | End: 2023-03-02
Attending: STUDENT IN AN ORGANIZED HEALTH CARE EDUCATION/TRAINING PROGRAM | Admitting: STUDENT IN AN ORGANIZED HEALTH CARE EDUCATION/TRAINING PROGRAM
Payer: COMMERCIAL

## 2023-03-02 VITALS
RESPIRATION RATE: 16 BRPM | OXYGEN SATURATION: 99 % | BODY MASS INDEX: 16.3 KG/M2 | HEIGHT: 66 IN | HEART RATE: 83 BPM | TEMPERATURE: 99 F | SYSTOLIC BLOOD PRESSURE: 105 MMHG | WEIGHT: 101.41 LBS | DIASTOLIC BLOOD PRESSURE: 51 MMHG

## 2023-03-02 DIAGNOSIS — M25.531 RIGHT WRIST PAIN: Primary | ICD-10-CM

## 2023-03-02 PROCEDURE — 370N000017 HC ANESTHESIA TECHNICAL FEE, PER MIN: Performed by: STUDENT IN AN ORGANIZED HEALTH CARE EDUCATION/TRAINING PROGRAM

## 2023-03-02 PROCEDURE — 250N000011 HC RX IP 250 OP 636: Performed by: ANESTHESIOLOGY

## 2023-03-02 PROCEDURE — 250N000011 HC RX IP 250 OP 636

## 2023-03-02 PROCEDURE — 250N000009 HC RX 250: Performed by: ANESTHESIOLOGY

## 2023-03-02 PROCEDURE — C1713 ANCHOR/SCREW BN/BN,TIS/BN: HCPCS | Performed by: STUDENT IN AN ORGANIZED HEALTH CARE EDUCATION/TRAINING PROGRAM

## 2023-03-02 PROCEDURE — 360N000083 HC SURGERY LEVEL 3 W/ FLUORO, PER MIN: Performed by: STUDENT IN AN ORGANIZED HEALTH CARE EDUCATION/TRAINING PROGRAM

## 2023-03-02 PROCEDURE — 25609 OPTX DST RD XART FX/EP SEP3+: CPT | Mod: RT | Performed by: STUDENT IN AN ORGANIZED HEALTH CARE EDUCATION/TRAINING PROGRAM

## 2023-03-02 PROCEDURE — 258N000003 HC RX IP 258 OP 636

## 2023-03-02 PROCEDURE — 999N000179 XR SURGERY CARM FLUORO LESS THAN 5 MIN W STILLS: Mod: TC

## 2023-03-02 PROCEDURE — 999N000141 HC STATISTIC PRE-PROCEDURE NURSING ASSESSMENT: Performed by: STUDENT IN AN ORGANIZED HEALTH CARE EDUCATION/TRAINING PROGRAM

## 2023-03-02 PROCEDURE — 710N000010 HC RECOVERY PHASE 1, LEVEL 2, PER MIN: Performed by: STUDENT IN AN ORGANIZED HEALTH CARE EDUCATION/TRAINING PROGRAM

## 2023-03-02 PROCEDURE — 250N000011 HC RX IP 250 OP 636: Performed by: PHYSICIAN ASSISTANT

## 2023-03-02 PROCEDURE — 710N000012 HC RECOVERY PHASE 2, PER MINUTE: Performed by: STUDENT IN AN ORGANIZED HEALTH CARE EDUCATION/TRAINING PROGRAM

## 2023-03-02 PROCEDURE — 272N000001 HC OR GENERAL SUPPLY STERILE: Performed by: STUDENT IN AN ORGANIZED HEALTH CARE EDUCATION/TRAINING PROGRAM

## 2023-03-02 PROCEDURE — 250N000009 HC RX 250

## 2023-03-02 RX ORDER — NALOXONE HYDROCHLORIDE 0.4 MG/ML
0.2 INJECTION, SOLUTION INTRAMUSCULAR; INTRAVENOUS; SUBCUTANEOUS
Status: DISCONTINUED | OUTPATIENT
Start: 2023-03-02 | End: 2023-03-02 | Stop reason: HOSPADM

## 2023-03-02 RX ORDER — OXYCODONE HYDROCHLORIDE 10 MG/1
10 TABLET ORAL EVERY 4 HOURS PRN
Status: DISCONTINUED | OUTPATIENT
Start: 2023-03-02 | End: 2023-03-02 | Stop reason: HOSPADM

## 2023-03-02 RX ORDER — PROPOFOL 10 MG/ML
INJECTION, EMULSION INTRAVENOUS CONTINUOUS PRN
Status: DISCONTINUED | OUTPATIENT
Start: 2023-03-02 | End: 2023-03-02

## 2023-03-02 RX ORDER — FENTANYL CITRATE 50 UG/ML
25 INJECTION, SOLUTION INTRAMUSCULAR; INTRAVENOUS EVERY 5 MIN PRN
Status: DISCONTINUED | OUTPATIENT
Start: 2023-03-02 | End: 2023-03-02 | Stop reason: HOSPADM

## 2023-03-02 RX ORDER — SODIUM CHLORIDE, SODIUM LACTATE, POTASSIUM CHLORIDE, CALCIUM CHLORIDE 600; 310; 30; 20 MG/100ML; MG/100ML; MG/100ML; MG/100ML
INJECTION, SOLUTION INTRAVENOUS CONTINUOUS
Status: DISCONTINUED | OUTPATIENT
Start: 2023-03-02 | End: 2023-03-02 | Stop reason: HOSPADM

## 2023-03-02 RX ORDER — DEXAMETHASONE SODIUM PHOSPHATE 4 MG/ML
INJECTION, SOLUTION INTRA-ARTICULAR; INTRALESIONAL; INTRAMUSCULAR; INTRAVENOUS; SOFT TISSUE PRN
Status: DISCONTINUED | OUTPATIENT
Start: 2023-03-02 | End: 2023-03-02

## 2023-03-02 RX ORDER — ONDANSETRON 4 MG/1
4 TABLET, ORALLY DISINTEGRATING ORAL EVERY 30 MIN PRN
Status: DISCONTINUED | OUTPATIENT
Start: 2023-03-02 | End: 2023-03-02 | Stop reason: HOSPADM

## 2023-03-02 RX ORDER — OXYCODONE HYDROCHLORIDE 5 MG/1
5 TABLET ORAL EVERY 4 HOURS PRN
Status: DISCONTINUED | OUTPATIENT
Start: 2023-03-02 | End: 2023-03-02 | Stop reason: HOSPADM

## 2023-03-02 RX ORDER — HYDROMORPHONE HYDROCHLORIDE 1 MG/ML
0.2 INJECTION, SOLUTION INTRAMUSCULAR; INTRAVENOUS; SUBCUTANEOUS EVERY 5 MIN PRN
Status: CANCELLED | OUTPATIENT
Start: 2023-03-02

## 2023-03-02 RX ORDER — FLUMAZENIL 0.1 MG/ML
0.2 INJECTION, SOLUTION INTRAVENOUS
Status: DISCONTINUED | OUTPATIENT
Start: 2023-03-02 | End: 2023-03-02 | Stop reason: HOSPADM

## 2023-03-02 RX ORDER — NALOXONE HYDROCHLORIDE 0.4 MG/ML
0.4 INJECTION, SOLUTION INTRAMUSCULAR; INTRAVENOUS; SUBCUTANEOUS
Status: DISCONTINUED | OUTPATIENT
Start: 2023-03-02 | End: 2023-03-02 | Stop reason: HOSPADM

## 2023-03-02 RX ORDER — BUPIVACAINE HYDROCHLORIDE AND EPINEPHRINE 5; 5 MG/ML; UG/ML
INJECTION, SOLUTION PERINEURAL
Status: COMPLETED | OUTPATIENT
Start: 2023-03-02 | End: 2023-03-02

## 2023-03-02 RX ORDER — SODIUM CHLORIDE, SODIUM LACTATE, POTASSIUM CHLORIDE, CALCIUM CHLORIDE 600; 310; 30; 20 MG/100ML; MG/100ML; MG/100ML; MG/100ML
INJECTION, SOLUTION INTRAVENOUS CONTINUOUS PRN
Status: DISCONTINUED | OUTPATIENT
Start: 2023-03-02 | End: 2023-03-02

## 2023-03-02 RX ORDER — ONDANSETRON 2 MG/ML
4 INJECTION INTRAMUSCULAR; INTRAVENOUS EVERY 30 MIN PRN
Status: DISCONTINUED | OUTPATIENT
Start: 2023-03-02 | End: 2023-03-02 | Stop reason: HOSPADM

## 2023-03-02 RX ORDER — ONDANSETRON 2 MG/ML
INJECTION INTRAMUSCULAR; INTRAVENOUS PRN
Status: DISCONTINUED | OUTPATIENT
Start: 2023-03-02 | End: 2023-03-02

## 2023-03-02 RX ORDER — CEFAZOLIN SODIUM/WATER 2 G/20 ML
2 SYRINGE (ML) INTRAVENOUS SEE ADMIN INSTRUCTIONS
Status: DISCONTINUED | OUTPATIENT
Start: 2023-03-02 | End: 2023-03-02 | Stop reason: HOSPADM

## 2023-03-02 RX ORDER — CEFAZOLIN SODIUM/WATER 2 G/20 ML
2 SYRINGE (ML) INTRAVENOUS
Status: COMPLETED | OUTPATIENT
Start: 2023-03-02 | End: 2023-03-02

## 2023-03-02 RX ORDER — KETAMINE HYDROCHLORIDE 10 MG/ML
INJECTION INTRAMUSCULAR; INTRAVENOUS PRN
Status: DISCONTINUED | OUTPATIENT
Start: 2023-03-02 | End: 2023-03-02

## 2023-03-02 RX ORDER — FENTANYL CITRATE 50 UG/ML
50 INJECTION, SOLUTION INTRAMUSCULAR; INTRAVENOUS EVERY 5 MIN PRN
Status: DISCONTINUED | OUTPATIENT
Start: 2023-03-02 | End: 2023-03-02 | Stop reason: HOSPADM

## 2023-03-02 RX ORDER — DEXAMETHASONE SODIUM PHOSPHATE 10 MG/ML
INJECTION, SOLUTION INTRAMUSCULAR; INTRAVENOUS
Status: COMPLETED | OUTPATIENT
Start: 2023-03-02 | End: 2023-03-02

## 2023-03-02 RX ORDER — FENTANYL CITRATE 50 UG/ML
25-50 INJECTION, SOLUTION INTRAMUSCULAR; INTRAVENOUS
Status: DISCONTINUED | OUTPATIENT
Start: 2023-03-02 | End: 2023-03-02 | Stop reason: HOSPADM

## 2023-03-02 RX ADMIN — DEXAMETHASONE SODIUM PHOSPHATE 4 MG: 4 INJECTION, SOLUTION INTRA-ARTICULAR; INTRALESIONAL; INTRAMUSCULAR; INTRAVENOUS; SOFT TISSUE at 11:06

## 2023-03-02 RX ADMIN — SODIUM CHLORIDE, POTASSIUM CHLORIDE, SODIUM LACTATE AND CALCIUM CHLORIDE: 600; 310; 30; 20 INJECTION, SOLUTION INTRAVENOUS at 10:49

## 2023-03-02 RX ADMIN — BUPIVACAINE HYDROCHLORIDE AND EPINEPHRINE BITARTRATE 20 ML: 5; .005 INJECTION, SOLUTION PERINEURAL at 10:10

## 2023-03-02 RX ADMIN — Medication 10 MG: at 11:16

## 2023-03-02 RX ADMIN — Medication 10 MG: at 11:06

## 2023-03-02 RX ADMIN — MIDAZOLAM 2 MG: 1 INJECTION INTRAMUSCULAR; INTRAVENOUS at 11:28

## 2023-03-02 RX ADMIN — Medication 2 G: at 11:06

## 2023-03-02 RX ADMIN — FENTANYL CITRATE 50 MCG: 50 INJECTION, SOLUTION INTRAMUSCULAR; INTRAVENOUS at 10:00

## 2023-03-02 RX ADMIN — PROPOFOL 100 MCG/KG/MIN: 10 INJECTION, EMULSION INTRAVENOUS at 10:56

## 2023-03-02 RX ADMIN — MIDAZOLAM 0.5 MG: 1 INJECTION INTRAMUSCULAR; INTRAVENOUS at 10:01

## 2023-03-02 RX ADMIN — ONDANSETRON 4 MG: 2 INJECTION INTRAMUSCULAR; INTRAVENOUS at 11:25

## 2023-03-02 RX ADMIN — Medication 10 MG: at 11:04

## 2023-03-02 RX ADMIN — DEXAMETHASONE SODIUM PHOSPHATE 2 MG: 10 INJECTION, SOLUTION INTRAMUSCULAR; INTRAVENOUS at 10:10

## 2023-03-02 ASSESSMENT — ACTIVITIES OF DAILY LIVING (ADL)
ADLS_ACUITY_SCORE: 35
ADLS_ACUITY_SCORE: 33
ADLS_ACUITY_SCORE: 35

## 2023-03-02 NOTE — OP NOTE
OPERATIVE REPORT    PATIENT NAME: Deya Mantilla  MRN: [id number]    DATE: 3/2/2023    PREOPERATIVE DIAGNOSIS:   1. Right intraarticular distal radius fracture    POSTOPERATIVE DIAGNOSIS:   1. Right intraarticular distal radius fracture    OPERATION:   1. Open reduction internal fixation of right closed displaced intraarticular distal radius fracture with 3+ fragments. 03811  2. Use of intraoperative fluoroscopy.    SURGEON: Dotty Mehta MD     ASSISTANT(S): Karen Barrett PA-C    The physician assistant was necessary for the procedure. They placed and held retractors to provide adequate exposure that allowed the case to proceed in a safe, efficient manner. They assisted in identifying and protecting important structures. They ligated blood vessels to maintain hemostasis and minimize bleeding risk. They suctioned fluids when needed. They assisted with the proper selection and positioning of any implants required for the case. They performed or assisted with wound closure of the operative incisions. An experienced physician assistant was medically necessary to ensure a safe and efficient procedure. The assistance that they provided decreased operative time and thereby, reduced the risk of infection and complications from prolonged time of anesthesia. Their assistance was vital in achieving best practices. No qualified residents or fellows were available to assist with this case.     STAFF: Circulator: Johana Hedrick, HAYDEE; Flora Calderon, HAYDEE  Relief Scrub: Chandler Loaiza  Scrub Person: Jorge Cramer    ANESTHESIA: Regional block and IV sedation    IMPLANT(S):   Implant Name Type Inv. Item Serial No.  Lot No. LRB No. Used Action   [employer] 2.5 ADAPTIVE II TriLock DistRad Pit Vol R 13 H, Long      Right 1 Implanted   [employer] BLUE 2.5 Locking Screw 16mm, HD7      Right 5 Implanted   [employer] BLUE 2.5 Locking Screw 18mm, HD7      Right 2 Implanted   [employer] GOLD 2.5 Non-Locking Cortex  Bone Screw 11mm HD7      Right 1 Implanted   [employer] GOLD 2.5 Non-Locking Cortex Bone Screw 12mm HD7      Right 3 Implanted   [employer] GOLD 2.5 Non-Locking Cortex Bone Screw 22mm HD7      Right 1 Implanted   [employer] K-Wire 1.6, Trocar, 150mm      Right 2 Implanted   [employer] Radial Drill Bits 2.0mm x 40mm, L91mm, AO      Right 1 Used as a Supply       SPECIMEN: * No specimens in log *    ESTIMATED BLOOD LOSS: < 5 mL.    FLUIDS: See anesthesia records.     URINE OUTPUT: Not applicable.  Hopper was not placed.     TOURNIQUET TIME: 47 minutes.    COMPLICATIONS: None.     INDICATIONS: Deya Mantilla is a 67 year old female who sustained a right closed displaced distal radius fracture after a fall. The fracture had comminution and intraarticular extension resulting in 4 main fragments. There was evidence of instability after closed reduction. Therefore, she was indicated for surgical repair. The risks, benefits, and alternatives were discussed with the patient.  The patient verbalized understanding of the treatment plan and an informed consent was signed.     DESCRIPTION OF PROCEDURE: The patient was taken to the operating room and placed in supine position on the operating table. Anesthesia was administered by the Department of Anesthesia followed by administration of antibiotics. A high arm tourniquet was applied to the right upper extremity, which was then prepped and draped in the usual sterile fashion.  A timeout was performed with all OR staff.  The patient name, MRN, operative extremity, procedure, allergies, antibiotics, DVT prophylaxis, and fire precautions/plan were reviewed, and all were in agreement. The extremity was exsanguinated with an Esmarch bandage and the tourniquet was inflated to 250 mmHg.     An ~ 6 cm incision was made over the FCR tendon extending from distal wrist crease proximally.  Blunt dissection was carried down to the level of the FCR tendon sheath which was incised.  The FCR was  retracted ulnarly followed by incision of the FCR subsheath with care taken to inspect for the palmar cutaneous branch of the median nerve to avoid injury.  The space of Parona was opened and the FPL was retracted laterally.  The pronator quadratus was elevated sharply off the radius. The brachioradialis tendon was dissected/isolated and a tenotomy was performed to increase mobility of the distal fragment to aid in reduction.  The fracture was identified and debrided to afford reduction.  Direct reduction was performed and provisionally held.  Fluoroscopy confirmed this reduction.  A Crude Area distal radius volar plate was then applied and fixed to the shaft through the oblong hole.  Fluoroscopy was again used to confirm reduction and plate position.  Once acceptable reduction was obtained, a combination of locking and non-locking screws were used to stabilize the fracture. Following fixation, there was full range of motion of the wrist and forearm. The DRUJ is tested and found to be stable.  Fluoroscopy was utilized to obtain AP, lateral, elevated lateral, and oblique views, which confirmed the reduction of the fracture, implant placement, and restoration of height, length, and tilt. There was no intra-articular penetration of the hardware. The tourniquet was let down. Hemostasis was achieved. The wound was irrigated.  Closure was performed with 3-0 monocryl for the deep dermal and subcuticular layers. Sterile dressings and a volar plaster splint were applied. Capillary refill was intact < 2 seconds.     The patient was aroused from anesthesia and taken to the recovery room in stable condition.      All counts were correct at the end of the case.       There were no complications.    POSTOPERATIVE PLAN: return to clinic in 2 weeks for wound check, transition to hand therapy removable splint and begin ROM at that time    FERDINAND COOPER MD

## 2023-03-02 NOTE — BRIEF OP NOTE
[facility]    Brief Operative Note    Pre-operative diagnosis: Wrist pain [M25.539]  Post-operative diagnosis Same as pre-operative diagnosis    Procedure: Procedure(s):  OPEN REDUCTION INTERNAL FIXATION, FRACTURE, Distal Radius WRIST  Surgeon: Surgeon(s) and Role:     * Dotty Mehta MD - Primary     * Skye Borrego PA-C - Assisting  Anesthesia: Choice with Block   Estimated Blood Loss: Minimal    Drains: None  Specimens: * No specimens in log *  Findings:   None.  Complications: None.  Implants: * No implants in log *     Dispo: Stable to PACU, then to home.      I positioned and prepped the patient. I placed and held retractors to provide adequate exposure that allowed the case to proceed in a safe, efficient manner. I assisted in identifying and protecting important structures. I ligated blood vessels to maintain hemostasis and minimize bleeding risk. I suctioned fluids when needed. I assisted with the proper selection and positioning of any implants required for the case. I performed or assisted with wound closure of the operative incisions.    An experienced physician assistant was medically necessary to ensure a safe and efficient procedure. The assistance that I provided decreased operative time and thereby, reduced the risk of infection and complications from prolonged time of anesthesia. My assistance was vital in achieving best practices.    No qualified residents or fellows were available to assist with this case.     SKYE BORREGO PA-C  3/2/2023 12:23 PM  Orthopaedic Surgery     Thank you for allowing me to participate in this patient's care. Please page me directly any questions/concerns.   Securely message with the Vocera Web Console (learn more here)  Text page via [employer] Paging/Directory    If there is no response, if it is a weekend, or if it is during evening hours, please page the orthopaedic surgery resident on call via [facility]  Paging/Directory

## 2023-03-02 NOTE — ANESTHESIA POSTPROCEDURE EVALUATION
Patient: Deya Mantilla    Procedure: Procedure(s):  OPEN REDUCTION INTERNAL FIXATION, FRACTURE, Distal Radius WRIST       Anesthesia Type:  General    Note:  Disposition: Outpatient   Postop Pain Control: Uneventful            Sign Out: Well controlled pain   PONV: No   Neuro/Psych: Uneventful            Sign Out: Acceptable/Baseline neuro status   Airway/Respiratory: Uneventful            Sign Out: Acceptable/Baseline resp. status   CV/Hemodynamics: Uneventful            Sign Out: Acceptable CV status; No obvious hypovolemia; No obvious fluid overload   Other NRE: NONE   DID A NON-ROUTINE EVENT OCCUR? No           Last vitals:  Vitals Value Taken Time   /70 03/02/23 1231   Temp     Pulse 75 03/02/23 1237   Resp     SpO2 100 % 03/02/23 1237   Vitals shown include unvalidated device data.    Electronically Signed By: Julia Stark MD  March 2, 2023  12:38 PM

## 2023-03-02 NOTE — OR NURSING
Surgeon states that she discussed with patient in regards to pain medications. Patient does not wish to take any narcotics and will take over the counter medications.

## 2023-03-02 NOTE — DISCHARGE INSTRUCTIONS
Date: 3/2/2023    MEDICATIONS   Resume all home medications as directed unless otherwise instructed during this hospitalization. If there is any question, double check with your primary care provider.  Start new discharge medications as directed.    Take 1-2 tablets of extra strength Tylenol 500 mg every 6 hours.    For breakthrough pain use narcotic pain medication as prescribed.    Do not drive or operate machinery while taking narcotic pain medications.   If you are taking other Tylenol containing medicines at home, be sure NOT to exceed 4 gram's (4000 milligrams) of Tylenol per day.   If you are taking pain medications, be sure to take Colace (docusate sodium) as well to prevent constipation. If constipated, try adding another cathartic or enema.  If nausea and vomiting, call the hospital or seek medical attention.    ACTIVITY   Weight bearing: Non-weight bearing to arm.    DIET  Resume same diet prior to your hospital admission.    WOUND   Leave dressing on until you are seen in clinic for your follow up visit.   Watch for signs and symptoms of infection of your wounds including; pain, redness, swelling, drainage or fever.  If you notice any of these symptoms please call or seek medical attention.    Keep wound clean, dry, and intact.  Do not submerge wounds in water until they are healed. No baths, soaking, swimming, or prolonged water exposure for 4 weeks after surgery.    RETURN   Follow-up with Orthopedic Clinic as directed.     Future Appointments   Date Time Provider Department Center   3/15/2023  2:00 PM Dotty Mehta MD [facility]   3/15/2023  2:30 PM Andie Soto OT [facility]       Call the [facility] at [phone] during business hours for any symptoms such as:    * Fevers with Temperature greater than 101.5 degrees.   * Pus drainage from wound site.   * Severe pain, not controlled by medication.   * Persistent nausea, vomiting and inablility to tolerate  fluids.    FOR URGENT PROBLEMS ONLY, after hours or on weekends call the hospital  at [phone] and ask to speak with the orthopedic resident on call.    You may also be seen at our Orthopedic Walk-In clinic that runs 7 days per week from 7a-5p at [address] [place] [id number]. You can call the Walk-In Clinic at [phone].    [facility] [place]  Same-Day Surgery   Adult Discharge Orders & Instructions     For 24 hours after surgery    Get plenty of rest.  A responsible adult must stay with you for at least 24 hours after you leave the hospital.   Do not drive or use heavy equipment.  If you have weakness or tingling, don't drive or use heavy equipment until this feeling goes away.  Do not drink alcohol.  Avoid strenuous or risky activities.  Ask for help when climbing stairs.   You may feel lightheaded.  IF so, sit for a few minutes before standing.  Have someone help you get up.   If you have nausea (feel sick to your stomach): Drink only clear liquids such as apple juice, ginger ale, broth or 7-Up.  Rest may also help.  Be sure to drink enough fluids.  Move to a regular diet as you feel able.  You may have a slight fever. Call the doctor if your fever is over 100 F (37.7 C) (taken under the tongue) or lasts longer than 24 hours.  You may have a dry mouth, a sore throat, muscle aches or trouble sleeping.  These should go away after 24 hours.  Do not make important or legal decisions.   Call your doctor for any of the followin.  Signs of infection (fever, growing tenderness at the surgery site, a large amount of drainage or bleeding, severe pain, foul-smelling drainage, redness, swelling).    2. It has been over 8 to 10 hours since surgery and you are still not able to urinate (pass water).    3.  Headache for over 24 hours.    4.  Numbness, tingling or weakness the day after surgery (if you had spinal anesthesia).  To contact a doctor, call ____[facility]  [facility] at [phone]__ or:    '   For urgent issues only, [phone] and ask for the resident on call for   _______orthopedics______________ (answered 24 hours a day)  (You may also be seen at our Orthopedic Walk-In clinic that runs 7 days per week from 7a-5p at [address] [place] [address]. You can call the Walk-In Clinic at [phone].)  '   Emergency Department:    [facility]: [phone]       (TTY for hearing impaired: [phone])    [facility]: [phone]       (TTY for hearing impaired: [phone])

## 2023-03-02 NOTE — ANESTHESIA PROCEDURE NOTES
Brachial plexus Procedure Note    Pre-Procedure   Staff -        Anesthesiologist:  Agustin Quigley MD       Resident/Fellow: Lincoln Sheldon       Performed By: resident       Location: pre-op       Procedure Start/Stop Times: 3/2/2023 10:00 AM and 3/2/2023 10:12 AM       Pre-Anesthestic Checklist: patient identified, IV checked, site marked, risks and benefits discussed, informed consent, monitors and equipment checked, pre-op evaluation, at physician/surgeon's request and post-op pain management  Timeout:       Correct Patient: Yes        Correct Procedure: Yes        Correct Site: Yes        Correct Position: Yes        Correct Laterality: Yes        Site Marked: Yes  Procedure Documentation  Procedure: Brachial plexus       Laterality: right       Patient Position: sitting       Patient Prep/Sterile Barriers: sterile gloves, mask       Skin prep: Chloraprep (axillary approach).       Needle Type: short bevel       Needle Gauge: 21.        Needle Length (millimeters): 100        Ultrasound guided       1. Ultrasound was used to identify targeted nerve, plexus, vascular marker, or fascial plane and place a needle adjacent to it in real-time.       2. Ultrasound was used to visualize the spread of anesthetic in close proximity to the above referenced structure.       3. A permanent image is entered into the patient's record.    Assessment/Narrative         The placement was negative for: blood aspirated, painful injection and site bleeding       Paresthesias: No.       Bolus given via needle..        Secured via.        Insertion/Infusion Method: Single Shot       Complications: none    Medication(s) Administered   Bupivacaine 0.5% w/ 1:200K Epi (Other) - Other   20 mL - 3/2/2023 10:10:00 AM  Dexamethasone 10 mg/mL PF (Perineural) - Perineural   2 mg - 3/2/2023 10:10:00 AM  Medication Administration Time: 3/2/2023 10:00 AM      FOR [facility] ([facility]) ONLY:   Pain Team Contact information: please  "page the Pain Team Via [facility]. Search \"Pain\". During daytime hours, please page the attending first. At night please page the resident first.    "

## 2023-03-02 NOTE — ANESTHESIA CARE TRANSFER NOTE
Patient: Deya Mantilla    Procedure: Procedure(s):  OPEN REDUCTION INTERNAL FIXATION, FRACTURE, Distal Radius WRIST       Diagnosis: Wrist pain [M25.539]  Diagnosis Additional Information: No value filed.    Anesthesia Type:   General     Note:    Oropharynx: spontaneously breathing  Level of Consciousness: awake  Oxygen Supplementation: face mask    Independent Airway: airway patency satisfactory and stable  Dentition: dentition unchanged  Vital Signs Stable: post-procedure vital signs reviewed and stable  Report to RN Given: handoff report given  Patient transferred to: PACU    Handoff Report: Identifed the Patient, Identified the Reponsible Provider, Reviewed the pertinent medical history, Discussed the surgical course, Reviewed Intra-OP anesthesia mangement and issues during anesthesia, Set expectations for post-procedure period and Allowed opportunity for questions and acknowledgement of understanding      Vitals:  Vitals Value Taken Time   BP     Temp     Pulse     Resp     SpO2         Electronically Signed By: Rambo Stovall MD  March 2, 2023  12:32 PM

## 2023-03-03 ENCOUNTER — TELEPHONE (OUTPATIENT)
Dept: ORTHOPEDICS | Facility: CLINIC | Age: 68
End: 2023-03-03
Payer: COMMERCIAL

## 2023-03-03 NOTE — TELEPHONE ENCOUNTER
[facility] Call Center    Phone Message    May a detailed message be left on voicemail: yes     Reason for Call: Other: Pt said she feels something hard is pressing on the wound and is hurting her     Action Taken: Other: ortho     Travel Screening: Not Applicable

## 2023-03-03 NOTE — TELEPHONE ENCOUNTER
"ATC called patient and discussed loosening splint.  Patient agreed and will try.  Also discussed normal pains and discomforts with being just one day post op.  Patient stated she just didn't know what to expect or what was \"normal' feelings to have after surgery.    Patient was appreciative of the call and felt better with the reassurance.    ASHLEY RUSH, ATC    "

## 2023-03-05 ENCOUNTER — NURSE TRIAGE (OUTPATIENT)
Dept: NURSING | Facility: CLINIC | Age: 68
End: 2023-03-05
Payer: COMMERCIAL

## 2023-03-05 DIAGNOSIS — M25.531 RIGHT WRIST PAIN: Primary | ICD-10-CM

## 2023-03-05 RX ORDER — HYDROXYZINE HYDROCHLORIDE 25 MG/1
25 TABLET, FILM COATED ORAL 3 TIMES DAILY PRN
Qty: 30 TABLET | Refills: 0 | Status: SHIPPED | OUTPATIENT
Start: 2023-03-05 | End: 2023-11-20

## 2023-03-05 NOTE — TELEPHONE ENCOUNTER
Nurse Triage SBAR    Situation:   -Post-Op rachael    Background:   -Patient calling.  -Has ORIF of wrist on 3/2/23 with Dotty Mehta MD    Assessment:   -has been taking ibuprofen and tylenol  -last night the pain was worse  -pain is 9/10 (which makes her nauseous)  -hand is still back and blue  -no chest pain   -no difficulty breathing  -no fever  -no vomiting or diarrhea  allergic to percocet - got hives every  (including mouth and hair)    Recommendation:   [facility] page on call Joyce Mensah  back directly to the patient   -if you do not hear from them call to have them paged again at  [phone]    DARRIN HOOPER RN on 3/5/2023 at 3:25 PM      Reason for Disposition    [1] SEVERE post-op pain (e.g., excruciating, pain scale 8-10) AND [2] not controlled with pain medications    Additional Information    Negative: Sounds like a life-threatening emergency to the triager    Negative: Chest pain    Negative: Difficulty breathing    Negative: Acting confused (e.g., disoriented, slurred speech) or excessively sleepy    Negative: Post-Op tonsil and adenoid surgery, symptoms or questions about    Negative: Surgical incision symptoms and questions    Negative: [1] Pain or burning with passing urine (urination) AND [2] male    Negative: [1] Pain or burning with passing urine (urination) AND [2] female    Negative: Constipation    Negative: New or worsening leg (calf, thigh) pain    Negative: New or worsening leg swelling    Negative: Dizziness is severe, or persists > 24 hours after surgery    Negative: Pain, redness, swelling, or pus at IV Site    Negative: Symptoms arising from use of a urinary catheter (e.g., coude, Hopper)    Negative: Cast problems or questions    Negative: Medication question    Negative: [1] Widespread rash AND [2] bright red, sunburn-like    Negative: [1] SEVERE headache AND [2] after spinal (epidural) anesthesia    Negative: [1] Vomiting AND [2] persists > 4 hours    Negative: [1] Vomiting  AND [2] abdomen looks much more swollen than usual    Negative: [1] Drinking very little AND [2] dehydration suspected (e.g., no urine > 12 hours, very dry mouth, very lightheaded)    Negative: Patient sounds very sick or weak to the triager    Negative: Sounds like a serious complication to the triager    Negative: Fever > 100.4 F (38.0 C)    Protocols used: POST-OP SYMPTOMS AND [id number]

## 2023-03-06 ENCOUNTER — TELEPHONE (OUTPATIENT)
Dept: ORTHOPEDICS | Facility: CLINIC | Age: 68
End: 2023-03-06
Payer: COMMERCIAL

## 2023-03-06 DIAGNOSIS — M25.531 RIGHT WRIST PAIN: Primary | ICD-10-CM

## 2023-03-06 NOTE — TELEPHONE ENCOUNTER
Patient experiencing post operative pain.  Spouse and patient on speaker phone, they called over the weekend and had some recommendations but still having pain.  It is moderate, she thinks it is just starting to improve but she would like relief sooner.  She is not alternating the tylenol/ibuprofen.  Not on a med schedule and taking it only prn. She is not on a narcotic per her choice since Oxycodone causes nausea/vomiting. She is icing only occasionally since it causes her to feel chilled.  She was prescribed Atarax this weekend but did not find a lot of benefit from it.      Instructed that the patient may take Tylenol OTC 1000 mg every 8 hours, no more than 3 times within a 24 hour period. The maximum dose of Tylenol is no more than 3000 mg within a 24 hour period. Also the patient may take Advil 800 mg every 8 hours with food, no more than 3 times in a 24 hour period. NSAIDS are to be used at discretion of their MD and should not be used in patients with a history of kidney disease or a GI bleed.  Tylenol and Advil may be alternated every 4 hours as needed for pain.    Also recommended to continue icing, elevating.  Asked patient if she would care to start on a little something stronger, like a narcotic medication, and they declined for now, they will try alternating the OTC meds first.  They will contact RN today if patient is not experiencing any improvement in pain. Blaire Hylton RN

## 2023-03-14 NOTE — PROGRESS NOTES
Hand Therapy Initial Evaluation    Current Date:  3/15/2023    Diagnosis: Right intraarticular distal radius fracture  DOI: 2/20/23  DOS: 3/2/23  Procedure:  Open reduction internal fixation of right closed displaced intraarticular distal radius fracture with 3+ fragments  Post:  1w 6d  Referring physician: Dotty Mehat MD    Precautions: Non-weight bearing right upper extremity    Subjective:  Deya Mantilla is a 67 year old female.    Patient reports symptoms of the right wrist which occurred when she slipped on ice and fell on an outstretched hand. Since onset symptoms are Gradually getting better.  General health as reported by patient is good.  Pertinent medical history includes:  Past Medical History:   Diagnosis Date     Allergic rhinitis, cause unspecified      Anorexia nervosa      Depressive disorder, not elsewhere classified      Disorder of bone and cartilage, unspecified      Migraine, unspecified, without mention of intractable migraine without mention of status migrainosus     with auras     Osteopenia      Personal history of colonic polyps 08/2007     Pure hypercholesterolemia     diet/exercise     Past Surgical History:   Procedure Laterality Date     C DEXA, BONE DENSITY, AXIAL SKEL  10/2005     COLONOSCOPY  08/2007    repeat 5 yr     COLONOSCOPY  08/21/2012    Procedure: COLONOSCOPY;  COLONOSCOPY;  Surgeon: Sang Gurrola MD;  Location:  GI     OPEN REDUCTION INTERNAL FIXATION WRIST Right 3/2/2023    Procedure: OPEN REDUCTION INTERNAL FIXATION, FRACTURE, Distal Radius WRIST;  Surgeon: Dotty Mehta MD;  Location: [facility] OR     SURGICAL HISTORY OF -   1989    Baker cyst removal L postauricular area     SURGICAL HISTORY OF -   05/2006    Distal Radius Fracture Repair     Current Outpatient Medications   Medication     buPROPion (WELLBUTRIN SR) 100 MG 12 hr tablet     buPROPion (WELLBUTRIN XL) 300 MG 24 hr tablet     escitalopram (LEXAPRO) 10 MG tablet     hydrOXYzine (ATARAX) 25 MG  tablet     hydrOXYzine (ATARAX) 25 MG tablet     MULTIVITAMIN TABS   OR     No current facility-administered medications for this visit.     Allergies   Allergen Reactions     Percocet [Oxycodone-Acetaminophen] Hives       Occupational Profile Information:  Right hand dominant  Current occupation is a  technician  Job Tasks: Computer Work, Lifting (up to 50 lbs), Carrying, Prolonged Standing, Repetitive Tasks  Currently not working due to present treatment problem  Prior functional level:  No limitations  Mobility: No difficulty  Transportation: Unable to use normal mode of transportation, due to current injury  Barriers include: None  Leisure activities/hobbies: Painting, sculpting, has a cat    Patient reports symptoms of pain, stiffness/loss of motion, weakness/loss of strength and edema  Patient reported occupational performance deficits: Self-cares, household chores, work, recreational activities  Special tests:  x-ray.    Previous treatment: Above procedure  Other: Patient has a history of a left distal radius fracture with ORIF about 15 years ago      Functional Outcome Measure:   Upper Extremity Functional Index Score:  SCORE: 9/80   (A lower score indicates greater disability.)          Objective:  Pain Level (Scale 0-10)   3/14/2023   At Rest 0/10   With Use 2-3/10     Pain Description  Date 3/14/2023   Location Wrist, volar aspect   Pain Quality Tightness at radiocarpal joint, pulling sensation (finger flexors)   Frequency intermittent     Pain is worst daytime   Exacerbated by Wrist and finger movement   Relieved by rest   Progression Gradually improving     Wound/Scar  Healing appropriately. No erythema, drainage, or signs of infection.    Edema  Circumference:  (Measured in cm)  DWC 3/15/2023   Right 16.2   Left 15.1     Sensation   WNL throughout all nerve distributions; per patient report.    ROM  Pain Report: - none  + mild    ++ moderate    +++ severe   Wrist 3/15/2023 3/15/2023   AROM  (PROM) Left Right   Extension 65 40   Flexion 75 55   RD 20 25   UD 23 25   Supination 85 70   Pronation 75 75     Hand and thumb ROM- Patient is able to fully extend fingers and make a full composite fist. Opposes thumb to ring finger. Able to actively flex and extend the thumb, motion is limited by pain.    Strength  Contraindicated at this time.    Assessment:  Patient presents with symptoms consistent with diagnosis of the above condition,  with surgical  intervention.     Patient's limitations or Problem List includes:  Pain, Decreased ROM/motion, Increased edema, Decreased , Decreased pinch, Decreased coordination and Decreased dexterity of the right wrist, hand and thumb which interferes with the patient's ability to perform Self Care Tasks (dressing, eating, bathing, hygiene/toileting), Work Tasks, Recreational Activities, Household Chores and Driving  as compared to previous level of function.    Rehab Potential:  Excellent - Return to full activity, no limitations    Patient will benefit from skilled Occupational Therapy to increase ROM,  strength, pinch strength, coordination and dexterity and decrease pain, edema and adherence of scarring to return to previous activity level and resume normal daily tasks and to reach their rehab potential.    Barriers to Learning:  No barrier    Communication Issues:  Patient appears to be able to clearly communicate and understand verbal and written communication and follow directions correctly.    Chart Review: Chart Review    Identified Performance Deficits: bathing/showering, toileting, dressing, feeding, hygiene and grooming, care of pets, driving and community mobility, home establishment and management, meal preparation and cleanup, shopping, sleep, work and leisure activities    Assessment of Occupational Performance:  5 or more Performance Deficits    Clinical Decision Making (Complexity): Low complexity    Treatment Explanation:  The following has been  discussed with the patient:  RX ordered/plan of care  Anticipated outcomes  Possible risks and side effects    Plan:  Frequency:  1 X week, once daily  Duration:  for 8 weeks    Treatment Plan:   Modalities:  US and Paraffin  Therapeutic Exercise:  AROM, AAROM, PROM, Tendon Gliding, Isotonics and Isometrics  Manual Techniques:  Joint mobilization, Scar mobilization and Myofascial release  Orthotic Fabrication:  Forearm-based circumferential orthosis    Discharge Plan:  Achieve all LTG.  Independent in home treatment program.  Reach maximal therapeutic benefit.    Home Exercise Program:  Orthosis, full-time, remove for hygiene and hand therapy exercises  Finger and thumb AROM  Tendon gliding  Wrist AROM    Next Visit:  Check orthosis  Progress ROM

## 2023-03-15 ENCOUNTER — THERAPY VISIT (OUTPATIENT)
Dept: OCCUPATIONAL THERAPY | Facility: CLINIC | Age: 68
End: 2023-03-15
Payer: COMMERCIAL

## 2023-03-15 ENCOUNTER — ANCILLARY PROCEDURE (OUTPATIENT)
Dept: GENERAL RADIOLOGY | Facility: CLINIC | Age: 68
End: 2023-03-15
Attending: STUDENT IN AN ORGANIZED HEALTH CARE EDUCATION/TRAINING PROGRAM
Payer: COMMERCIAL

## 2023-03-15 ENCOUNTER — OFFICE VISIT (OUTPATIENT)
Dept: ORTHOPEDICS | Facility: CLINIC | Age: 68
End: 2023-03-15
Payer: COMMERCIAL

## 2023-03-15 DIAGNOSIS — Z47.89 AFTERCARE FOLLOWING SURGERY OF THE MUSCULOSKELETAL SYSTEM: ICD-10-CM

## 2023-03-15 DIAGNOSIS — S52.531A CLOSED COLLES' FRACTURE OF RIGHT RADIUS, INITIAL ENCOUNTER: Primary | ICD-10-CM

## 2023-03-15 DIAGNOSIS — M25.531 RIGHT WRIST PAIN: ICD-10-CM

## 2023-03-15 PROCEDURE — 97110 THERAPEUTIC EXERCISES: CPT | Mod: GO | Performed by: OCCUPATIONAL THERAPIST

## 2023-03-15 PROCEDURE — 97165 OT EVAL LOW COMPLEX 30 MIN: CPT | Mod: GO | Performed by: OCCUPATIONAL THERAPIST

## 2023-03-15 PROCEDURE — 99024 POSTOP FOLLOW-UP VISIT: CPT | Performed by: STUDENT IN AN ORGANIZED HEALTH CARE EDUCATION/TRAINING PROGRAM

## 2023-03-15 PROCEDURE — 73110 X-RAY EXAM OF WRIST: CPT | Mod: RT | Performed by: RADIOLOGY

## 2023-03-15 PROCEDURE — 97760 ORTHOTIC MGMT&TRAING 1ST ENC: CPT | Mod: GO | Performed by: OCCUPATIONAL THERAPIST

## 2023-03-15 NOTE — LETTER
3/15/2023         RE: Deya Mantilla  [address] [place] [address]        Dear Colleague,    Thank you for referring your patient, Deya Mantilla, to the [facility] [place]. Please see a copy of my visit note below.    Chief Complaint: No chief complaint on file.      Date of Injury: 2/20/2023  Mechanism of Injury: Fall  Diagnosis: Right distal radius fracture  Surgery: 3/2/2023: right distal radius open reduction internal fixation    History of Present Illness: Deya Mantilla is a 67 year old RHD female presenting for evaluation of right distal radius fracture. She fell on ice. She had a closed reduction in the emergency department. She still has significant pain, and now has worsening of her finger swelling and bruising.    Clinical documentation by  on 2/20/2023 was reviewed.    Interval 3/15/2023: reports improvement in her pain, no numbness/tingling, has been moving her fingers with the splint.    Occupation: works at [employer]    Physical Examination:  There were no vitals filed for this visit.  There is no height or weight on file to calculate BMI.    Well appearing, well nourished  Alert and oriented x 3, cooperative with exam     Right wrist  Incision healing without evidence of infection  Mild swelling and tenderness to palpation about distal radius as expected postoperatively  Motor Exam: Intact TU/OK/x2-3.  Sensory Exam: Sensation intact to light touch in FDWS (radial), volar IF (median), volar SF (ulnar)  Vascular Exam: 2+ radial pulse, < 2 sec capillary refill    Imaging/Studies:  XR (3 views) of the right wrist was obtained 3/15/2023.  I reviewed the images with the patient.  The imaging study shows distal radius fracture treated with plate and screw fixation.    XR (3 views) of the right wrist was obtained 2/22/2023.  I reviewed the images with the patient.  The imaging study shows Comminuted extra-articular  distal radius fracture with possible nondisplaced intra-articular split.  Dorsal angulation measuring about 13 degrees. Compared to postreduction films on 2/20/2023, there is an increase in dorsal angulation    Assessment: Deya Mantilla is a 67 year old female with right distal radius fracture. Now s/p right distal radius ORIF.    Plan:   I had a discussion with the patient regarding my clinical findings, diagnosis, and treatment plan. We reviewed the post-operative plan, frequency of follow-up, rehabilitation, and expected outcomes.  All questions answered.      NWB right upper extremity.    OK to wash hands/shower.  Do not submerge incision until 4 weeks from the date of surgery.    Reviewed digit/wrist/forearm ROM exercises. Hand therapy for splint.    Next Visit:    Follow-up: 4 week(s).    Imaging: XR right wrist.    Plan: review imaging, advance activities.        FERDINAND COOPER MD

## 2023-03-15 NOTE — NURSING NOTE
Reason For Visit:   Chief Complaint   Patient presents with     Surgical Followup     2 wk POP right wrist ORIF DOS: 3/2/23       Primary MD: Kavita Lombardo  Ref. MD: SUNITA    Age: 67 year old    ?  No      LMP  (LMP Unknown)       Pain Assessment  Patient Currently in Pain: No    Hand Dominance Evaluation  Hand Dominance: Right          QuickDASH Assessment  No flowsheet data found.       Current Outpatient Medications   Medication Sig Dispense Refill     buPROPion (WELLBUTRIN SR) 100 MG 12 hr tablet Take 1 tablet (100 mg) by mouth 2 times daily 180 tablet 3     buPROPion (WELLBUTRIN XL) 300 MG 24 hr tablet Take 1 tablet (300 mg) by mouth every morning (Patient taking differently: Take 300 mg by mouth every morning) 90 tablet 3     escitalopram (LEXAPRO) 10 MG tablet Take 1 tablet (10 mg) by mouth daily (Patient taking differently: Take 10 mg by mouth every morning) 90 tablet 3     hydrOXYzine (ATARAX) 25 MG tablet Take 1 tablet (25 mg) by mouth 3 times daily as needed for itching 30 tablet 0     hydrOXYzine (ATARAX) 25 MG tablet Take 1 tablet (25 mg) by mouth 3 times daily as needed (Pain) 30 tablet 0     MULTIVITAMIN TABS   OR Take by mouth daily (with lunch) With extra calcium and Vitamin D         Allergies   Allergen Reactions     Percocet [Oxycodone-Acetaminophen] ANIA Louis

## 2023-03-15 NOTE — PROGRESS NOTES
Chief Complaint: No chief complaint on file.      Date of Injury: 2/20/2023  Mechanism of Injury: Fall  Diagnosis: Right distal radius fracture  Surgery: 3/2/2023: right distal radius open reduction internal fixation    History of Present Illness: Deya Mantilla is a 67 year old RHD female presenting for evaluation of right distal radius fracture. She fell on ice. She had a closed reduction in the emergency department. She still has significant pain, and now has worsening of her finger swelling and bruising.    Clinical documentation by  on 2/20/2023 was reviewed.    Interval 3/15/2023: reports improvement in her pain, no numbness/tingling, has been moving her fingers with the splint.    Occupation: works at [employer]    Physical Examination:  There were no vitals filed for this visit.  There is no height or weight on file to calculate BMI.    Well appearing, well nourished  Alert and oriented x 3, cooperative with exam     Right wrist  Incision healing without evidence of infection  Mild swelling and tenderness to palpation about distal radius as expected postoperatively  Motor Exam: Intact TU/OK/x2-3.  Sensory Exam: Sensation intact to light touch in FDWS (radial), volar IF (median), volar SF (ulnar)  Vascular Exam: 2+ radial pulse, < 2 sec capillary refill    Imaging/Studies:  XR (3 views) of the right wrist was obtained 3/15/2023.  I reviewed the images with the patient.  The imaging study shows distal radius fracture treated with plate and screw fixation.    XR (3 views) of the right wrist was obtained 2/22/2023.  I reviewed the images with the patient.  The imaging study shows Comminuted extra-articular distal radius fracture with possible nondisplaced intra-articular split.  Dorsal angulation measuring about 13 degrees. Compared to postreduction films on 2/20/2023, there is an increase in dorsal angulation    Assessment: Deya Mantilla is a 67 year old female with right distal  radius fracture. Now s/p right distal radius ORIF.    Plan:   I had a discussion with the patient regarding my clinical findings, diagnosis, and treatment plan. We reviewed the post-operative plan, frequency of follow-up, rehabilitation, and expected outcomes.  All questions answered.      NWB right upper extremity.    OK to wash hands/shower.  Do not submerge incision until 4 weeks from the date of surgery.    Reviewed digit/wrist/forearm ROM exercises. Hand therapy for splint.    Next Visit:    Follow-up: 4 week(s).    Imaging: XR right wrist.    Plan: review imaging, advance activities.        FERDINAND COOPER MD

## 2023-03-15 NOTE — PROGRESS NOTES
[facility]    OUTPATIENT Occupational Therapy ORTHOPEDIC EVALUATION  PLAN OF TREATMENT FOR OUTPATIENT REHABILITATION  (COMPLETE FOR INITIAL CLAIMS ONLY)  Patient's Last Name, First Name, M.I.  YOB: 1955  Deya Hernadez    Provider s Name:  [facility]   Medical Record No.  [id number]   Start of Care Date:  03/15/23   Onset Date:  03/02/2302/20/23   Treatment Diagnosis:  Right distal radius fracture Medical Diagnosis:  Data Unavailable       Goals:     03/15/23 0500   Goal #1   Goal #1 eating   Current Functional Task Hold   Current Performance Level Unable to hold utensil in right hand.   STG Target Performance Utensil   STG Target Perform Level Able to use right hand to eat finger foods.   Due Date 03/29/23   LTG Target Task/Performance No Difficulty   Due date 04/12/23   Goals #2   Goal #2 household chores   Current Functional Task    Current Performance Level Unable to open a jar.   STG Target Perfomance Open a tight or new jar   STG Target Perform Level Able to open a jar with 3/10 pain   Due Date 04/26/23   LTG Target Task/Performance Pain free household chores   Due Date 05/10/23         Therapy Frequency:  1x/week  Predicted Duration of Therapy Intervention:  8 weeks    Andie Soto OT                 I CERTIFY THE NEED FOR THESE SERVICES FURNISHED UNDER        THIS PLAN OF TREATMENT AND WHILE UNDER MY CARE     (Physician attestation of this document indicates review and certification of the therapy plan).                     Certification Date From:  03/15/23   Certification Date To:  05/10/23    Referring Provider:  Dotty Mehta MD    Initial Assessment        See Epic Evaluation SOC Date: 03/15/23

## 2023-03-29 ENCOUNTER — THERAPY VISIT (OUTPATIENT)
Dept: OCCUPATIONAL THERAPY | Facility: CLINIC | Age: 68
End: 2023-03-29
Payer: COMMERCIAL

## 2023-03-29 DIAGNOSIS — M25.531 RIGHT WRIST PAIN: Primary | ICD-10-CM

## 2023-03-29 DIAGNOSIS — Z47.89 AFTERCARE FOLLOWING SURGERY OF THE MUSCULOSKELETAL SYSTEM: ICD-10-CM

## 2023-03-29 PROCEDURE — 97763 ORTHC/PROSTC MGMT SBSQ ENC: CPT | Mod: GO | Performed by: OCCUPATIONAL THERAPIST

## 2023-03-29 PROCEDURE — 97110 THERAPEUTIC EXERCISES: CPT | Mod: GO | Performed by: OCCUPATIONAL THERAPIST

## 2023-03-29 NOTE — PROGRESS NOTES
SOAP note objective information for 3/29/2023.  Please refer to the daily flowsheet for treatment today, total treatment time and time spent performing 1:1 timed codes.     Diagnosis: Right intraarticular distal radius fracture  DOI: 2/20/23  DOS: 3/2/23  Procedure:  Open reduction internal fixation of right closed displaced intraarticular distal radius fracture with 3+ fragments  Post:  3w 6d  Referring physician: Dotty Mehta MD    Objective:  Pain Level (Scale 0-10)   3/14/2023 3/29/2023   At Rest 0/10    With Use 2-3/10 Mild     Pain Description  Date 3/14/2023   Location Wrist, volar aspect   Pain Quality Tightness at radiocarpal joint, pulling sensation (finger flexors)   Frequency intermittent     Pain is worst daytime   Exacerbated by Wrist and finger movement   Relieved by rest   Progression Gradually improving     Wound/Scar  Healing appropriately. Non-tender to scar massage.    Edema  Circumference:  (Measured in cm)  [facility] 3/15/2023   Right 16.2   Left 15.1     Sensation   WNL throughout all nerve distributions; per patient report.    ROM  Pain Report: - none  + mild    ++ moderate    +++ severe   Wrist 3/15/2023 3/15/2023 3/29/2023   AROM (PROM) Left Right Right   Extension 65 40 35   Flexion 75 55 60   RD 20 25 NT   UD 23 25 NT   Supination 85 70 85   Pronation 75 75 80     Hand and thumb ROM  3/15/23- Patient is able to fully extend fingers and make a full composite fist. Opposes thumb to ring finger. Able to actively flex and extend the thumb, motion is limited by pain.  3/29/23- Patient is able to fully extend fingers and make a full composite fist. Opposes to PIP joint of small finger.    Strength  Contraindicated at this time.    Home Exercise Program:  Orthosis, full-time, remove for hygiene and hand therapy exercises  Finger and thumb AROM  Tendon gliding  Wrist AROM    Next Visit:  Progress ROM

## 2023-04-05 ENCOUNTER — THERAPY VISIT (OUTPATIENT)
Dept: OCCUPATIONAL THERAPY | Facility: CLINIC | Age: 68
End: 2023-04-05
Payer: COMMERCIAL

## 2023-04-05 DIAGNOSIS — M25.531 RIGHT WRIST PAIN: Primary | ICD-10-CM

## 2023-04-05 DIAGNOSIS — Z47.89 AFTERCARE FOLLOWING SURGERY OF THE MUSCULOSKELETAL SYSTEM: ICD-10-CM

## 2023-04-05 PROCEDURE — 97140 MANUAL THERAPY 1/> REGIONS: CPT | Mod: GO | Performed by: OCCUPATIONAL THERAPIST

## 2023-04-05 PROCEDURE — 97110 THERAPEUTIC EXERCISES: CPT | Mod: GO | Performed by: OCCUPATIONAL THERAPIST

## 2023-04-05 NOTE — PROGRESS NOTES
SOAP note objective information for 4/5/2023.  Please refer to the daily flowsheet for treatment today, total treatment time and time spent performing 1:1 timed codes.     Diagnosis: Right intraarticular distal radius fracture  DOI: 2/20/23  DOS: 3/2/23  Procedure:  Open reduction internal fixation of right closed displaced intraarticular distal radius fracture with 3+ fragments  Post:  4w 6d  Referring physician: Dotty Mehta MD    Objective:  Pain Level (Scale 0-10)   3/14/2023 3/29/2023   At Rest 0/10    With Use 2-3/10 Mild     Pain Description  Date 3/14/2023   Location Wrist, volar aspect   Pain Quality Tightness at radiocarpal joint, pulling sensation (finger flexors)   Frequency intermittent     Pain is worst daytime   Exacerbated by Wrist and finger movement   Relieved by rest   Progression Gradually improving     Wound/Scar  Healing appropriately. Non-tender to scar massage.    Edema  Circumference:  (Measured in cm)  [facility] 3/15/2023 4/5/2023   Right 16.2 16.1   Left 15.1      Sensation   WNL throughout all nerve distributions; per patient report.    ROM  Pain Report: - none  + mild    ++ moderate    +++ severe   Wrist 3/15/2023 3/15/2023 3/29/2023 4/5/2023   AROM (PROM) Left Right Right Right   Extension 65 40 35 40   Flexion 75 55 60 60   RD 20 25 NT 20   UD 23 25 NT 25   Supination 85 70 85    Pronation 75 75 80      Hand and thumb ROM  3/15/23- Patient is able to fully extend fingers and make a full composite fist. Opposes thumb to ring finger. Able to actively flex and extend the thumb, motion is limited by pain.  3/29/23- Patient is able to fully extend fingers and make a full composite fist. Opposes to PIP joint of small finger.    Strength  Contraindicated at this time.    Home Exercise Program:  Orthosis, full-time, remove for hygiene and hand therapy exercises  Finger and thumb AROM  Tendon gliding  Wrist AROM    Next Visit:  Progress ROM

## 2023-04-06 DIAGNOSIS — M25.531 RIGHT WRIST PAIN: Primary | ICD-10-CM

## 2023-04-12 ENCOUNTER — ANCILLARY PROCEDURE (OUTPATIENT)
Dept: GENERAL RADIOLOGY | Facility: CLINIC | Age: 68
End: 2023-04-12
Attending: STUDENT IN AN ORGANIZED HEALTH CARE EDUCATION/TRAINING PROGRAM
Payer: COMMERCIAL

## 2023-04-12 ENCOUNTER — OFFICE VISIT (OUTPATIENT)
Dept: ORTHOPEDICS | Facility: CLINIC | Age: 68
End: 2023-04-12
Payer: COMMERCIAL

## 2023-04-12 ENCOUNTER — THERAPY VISIT (OUTPATIENT)
Dept: OCCUPATIONAL THERAPY | Facility: CLINIC | Age: 68
End: 2023-04-12
Payer: COMMERCIAL

## 2023-04-12 DIAGNOSIS — M25.531 RIGHT WRIST PAIN: ICD-10-CM

## 2023-04-12 DIAGNOSIS — S52.531A CLOSED COLLES' FRACTURE OF RIGHT RADIUS, INITIAL ENCOUNTER: ICD-10-CM

## 2023-04-12 DIAGNOSIS — M85.831 OSTEOPENIA OF RIGHT FOREARM: Primary | ICD-10-CM

## 2023-04-12 DIAGNOSIS — M25.531 RIGHT WRIST PAIN: Primary | ICD-10-CM

## 2023-04-12 DIAGNOSIS — Z47.89 AFTERCARE FOLLOWING SURGERY OF THE MUSCULOSKELETAL SYSTEM: ICD-10-CM

## 2023-04-12 PROCEDURE — 73110 X-RAY EXAM OF WRIST: CPT | Mod: RT | Performed by: RADIOLOGY

## 2023-04-12 PROCEDURE — 97110 THERAPEUTIC EXERCISES: CPT | Mod: GO | Performed by: OCCUPATIONAL THERAPIST

## 2023-04-12 PROCEDURE — 99024 POSTOP FOLLOW-UP VISIT: CPT | Performed by: STUDENT IN AN ORGANIZED HEALTH CARE EDUCATION/TRAINING PROGRAM

## 2023-04-12 NOTE — NURSING NOTE
Reason For Visit:   Chief Complaint   Patient presents with     RECHECK     4 week follow up ORIF of right closed displaced intraarticular distal radius fracture DOS: 3/2/23       Primary MD: Kavita Lombardo  Ref. MD: Pratima    Age: 67 year old    ?  No      LMP  (LMP Unknown)       Pain Assessment  Patient Currently in Pain: Yes  0-10 Pain Scale: 0 (no pain at rest some pain with forced extension)  Primary Pain Location: Wrist (Right)  Pain Descriptors: Intermittent    Hand Dominance Evaluation  Hand Dominance: Right          QuickDASH Assessment      4/12/2023    12:47 PM   QuickDASH Main   1. Open a tight or new jar Mild difficulty   2. Do heavy household chores (e.g., wash walls, floors) No difficulty   3. Carry a shopping bag or briefcase No difficulty   4. Wash your back No difficulty   5. Use a knife to cut food No difficulty   6. Recreational activities in which you take some force or impact through your arm, shoulder or hand (e.g., golf, hammering, tennis, etc.) Mild difficulty   7. During the past week, to what extent has your arm, shoulder or hand problem interfered with your normal social activities with family, friends, neighbours or groups Not at all   8. During the past week, were you limited in your work or other regular daily activities as a result of your arm, shoulder or hand problem Not limited at all   9. Arm, shoulder or hand pain Mild   10.Tingling (pins and needles) in your arm,shoulder or hand Mild   11. During the past week, how much difficulty have you had sleeping because of the pain in your arm, shoulder or hand No difficulty   Quickdash Ability Score 9.09          Current Outpatient Medications   Medication Sig Dispense Refill     buPROPion (WELLBUTRIN SR) 100 MG 12 hr tablet Take 1 tablet (100 mg) by mouth 2 times daily 180 tablet 3     buPROPion (WELLBUTRIN XL) 300 MG 24 hr tablet Take 1 tablet (300 mg) by mouth every morning (Patient taking differently: Take 300 mg by  mouth every morning) 90 tablet 3     escitalopram (LEXAPRO) 10 MG tablet Take 1 tablet (10 mg) by mouth daily (Patient taking differently: Take 10 mg by mouth every morning) 90 tablet 3     hydrOXYzine (ATARAX) 25 MG tablet Take 1 tablet (25 mg) by mouth 3 times daily as needed for itching 30 tablet 0     hydrOXYzine (ATARAX) 25 MG tablet Take 1 tablet (25 mg) by mouth 3 times daily as needed (Pain) 30 tablet 0     MULTIVITAMIN TABS   OR Take by mouth daily (with lunch) With extra calcium and Vitamin D         Allergies   Allergen Reactions     Percocet [Oxycodone-Acetaminophen] ASHLEY Castle, ATC

## 2023-04-12 NOTE — PROGRESS NOTES
SOAP note objective information for 4/12/2023.  Please refer to the daily flowsheet for treatment today, total treatment time and time spent performing 1:1 timed codes.     Diagnosis: Right intraarticular distal radius fracture  DOI: 2/20/23  DOS: 3/2/23  Procedure:  Open reduction internal fixation of right closed displaced intraarticular distal radius fracture with 3+ fragments  Post:  5w 6d  Referring physician: Dotty Mehta MD    Objective:  Pain Level (Scale 0-10)   3/14/2023 3/29/2023 4/12/2023   At Rest 0/10     With Use 2-3/10 Mild 0-2/10     Pain Description  Date 3/14/2023   Location Wrist, volar aspect   Pain Quality Tightness at radiocarpal joint, pulling sensation (finger flexors)   Frequency intermittent     Pain is worst daytime   Exacerbated by Wrist and finger movement   Relieved by rest   Progression Gradually improving     Wound/Scar  Healing appropriately. Non-tender to scar massage.    Edema  Circumference:  (Measured in cm)  [facility] 3/15/2023 4/5/2023   Right 16.2 16.1   Left 15.1      Sensation   WNL throughout all nerve distributions; per patient report.    ROM  Pain Report: - none  + mild    ++ moderate    +++ severe   Wrist 3/15/2023 3/15/2023 3/29/2023 4/5/2023 4/12/2023   AROM (PROM) Left Right Right Right Right   Extension 65 40 35 40 47   Flexion 75 55 60 60 64   RD 20 25 NT 20 NT   UD 23 25 NT 25 NT   Supination 85 70 85  WNL   Pronation 75 75 80  WNL     Hand and thumb ROM  3/15/23- Patient is able to fully extend fingers and make a full composite fist. Opposes thumb to ring finger. Able to actively flex and extend the thumb, motion is limited by pain.  3/29/23- Patient is able to fully extend fingers and make a full composite fist. Opposes to PIP joint of small finger.    Strength  Deferred at this time.    Home Exercise Program:  Orthosis, full-time, remove for hygiene and hand therapy exercises  Finger and thumb AROM  Tendon gliding  Wrist AROM  4/12/2023  Gentle   strengthening  Wrist extension AAROM, gentle PROM  Wean from orthosis for light activity    Next Visit:  Progress ROM

## 2023-04-12 NOTE — PROGRESS NOTES
Chief Complaint:   Chief Complaint   Patient presents with     RECHECK     4 week follow up ORIF of right closed displaced intraarticular distal radius fracture DOS: 3/2/23       Date of Injury: 2/20/2023  Mechanism of Injury: Fall  Diagnosis: Right distal radius fracture  Surgery: 3/2/2023: right distal radius open reduction internal fixation    History of Present Illness: Deya Mantilla is a 67 year old RHD female presenting for evaluation of right distal radius fracture. She fell on ice. She had a closed reduction in the emergency department. She still has significant pain, and now has worsening of her finger swelling and bruising.    Clinical documentation by  on 2/20/2023 was reviewed.    Interval 3/15/2023: reports improvement in her pain, no numbness/tingling, has been moving her fingers with the splint.    Interval 4/12/2023: minimal pain in the wrist, improvements in ROM, no numbness/tingling.     Occupation: works at [employer]    Physical Examination:  There were no vitals filed for this visit.  There is no height or weight on file to calculate BMI.    Well appearing, well nourished  Alert and oriented x 3, cooperative with exam     Right wrist  Incision healing without evidence of infection  Mild swelling and tenderness to palpation about distal radius as expected postoperatively  Flexion 50, extension 40, supination 70, pronation 70  Motor Exam: Intact TU/OK/x2-3.  Sensory Exam: Sensation intact to light touch in FDWS (radial), volar IF (median), volar SF (ulnar)  Vascular Exam: 2+ radial pulse, < 2 sec capillary refill    Imaging/Studies:  XR (3 views) of the right wrist was obtained 3/15/2023.  I reviewed the images with the patient.  The imaging study shows distal radius fracture treated with plate and screw fixation. Small amount of callus formation. No change in hardware    XR (3 views) of the right wrist was obtained 3/15/2023.  I reviewed the images with the patient.  The  imaging study shows distal radius fracture treated with plate and screw fixation.    XR (3 views) of the right wrist was obtained 2/22/2023.  I reviewed the images with the patient.  The imaging study shows Comminuted extra-articular distal radius fracture with possible nondisplaced intra-articular split.  Dorsal angulation measuring about 13 degrees. Compared to postreduction films on 2/20/2023, there is an increase in dorsal angulation    Assessment: Deya Mantilla is a 67 year old female with right distal radius fracture. Now s/p right distal radius ORIF.    Plan:   I had a discussion with the patient regarding my clinical findings, diagnosis, and treatment plan. We reviewed the post-operative plan, frequency of follow-up, rehabilitation, and expected outcomes.  All questions answered.      PWB (< 5 lbs) right upper extremity.    Continue hand therapy exercises for ROM  - DEXA scan ordered    Next Visit:    Follow-up: 6 week(s).    Imaging: XR right wrist.    Plan: review imaging, advance activities.        FERDINAND COOPER MD

## 2023-04-12 NOTE — LETTER
4/12/2023         RE: Deya Mantilla  [address]  [place] [address]        Dear Colleague,    Thank you for referring your patient, Deya Mantilla, to the [facility] [place]. Please see a copy of my visit note below.    Chief Complaint:   Chief Complaint   Patient presents with    RECHECK     4 week follow up ORIF of right closed displaced intraarticular distal radius fracture DOS: 3/2/23       Date of Injury: 2/20/2023  Mechanism of Injury: Fall  Diagnosis: Right distal radius fracture  Surgery: 3/2/2023: right distal radius open reduction internal fixation    History of Present Illness: Deya Mantilla is a 67 year old RHD female presenting for evaluation of right distal radius fracture. She fell on ice. She had a closed reduction in the emergency department. She still has significant pain, and now has worsening of her finger swelling and bruising.    Clinical documentation by  on 2/20/2023 was reviewed.    Interval 3/15/2023: reports improvement in her pain, no numbness/tingling, has been moving her fingers with the splint.    Interval 4/12/2023: minimal pain in the wrist, improvements in ROM, no numbness/tingling.     Occupation: works at [employer]    Physical Examination:  There were no vitals filed for this visit.  There is no height or weight on file to calculate BMI.    Well appearing, well nourished  Alert and oriented x 3, cooperative with exam     Right wrist  Incision healing without evidence of infection  Mild swelling and tenderness to palpation about distal radius as expected postoperatively  Flexion 50, extension 40, supination 70, pronation 70  Motor Exam: Intact TU/OK/x2-3.  Sensory Exam: Sensation intact to light touch in FDWS (radial), volar IF (median), volar SF (ulnar)  Vascular Exam: 2+ radial pulse, < 2 sec capillary refill    Imaging/Studies:  XR (3 views) of the right wrist was obtained 3/15/2023.  I  reviewed the images with the patient.  The imaging study shows distal radius fracture treated with plate and screw fixation. Small amount of callus formation. No change in hardware    XR (3 views) of the right wrist was obtained 3/15/2023.  I reviewed the images with the patient.  The imaging study shows distal radius fracture treated with plate and screw fixation.    XR (3 views) of the right wrist was obtained 2/22/2023.  I reviewed the images with the patient.  The imaging study shows Comminuted extra-articular distal radius fracture with possible nondisplaced intra-articular split.  Dorsal angulation measuring about 13 degrees. Compared to postreduction films on 2/20/2023, there is an increase in dorsal angulation    Assessment: Deya Mantilla is a 67 year old female with right distal radius fracture. Now s/p right distal radius ORIF.    Plan:   I had a discussion with the patient regarding my clinical findings, diagnosis, and treatment plan. We reviewed the post-operative plan, frequency of follow-up, rehabilitation, and expected outcomes.  All questions answered.     PWB (< 5 lbs) right upper extremity.   Continue hand therapy exercises for ROM  - DEXA scan ordered    Next Visit:   Follow-up: 6 week(s).   Imaging: XR right wrist.   Plan: review imaging, advance activities.  FERDINAND COOPER MD

## 2023-05-15 DIAGNOSIS — S52.531A CLOSED COLLES' FRACTURE OF RIGHT RADIUS, INITIAL ENCOUNTER: Primary | ICD-10-CM

## 2023-05-23 NOTE — PROGRESS NOTES
Chief Complaint:   Chief Complaint   Patient presents with     Surgical Followup     12 wk POP right wrist ORIF DOS: 3/2/23       Date of Injury: 2/20/2023  Mechanism of Injury: Fall  Diagnosis: Right distal radius fracture  Surgery: 3/2/2023: right distal radius open reduction internal fixation    History of Present Illness: Deya Mantilla is a 67 year old RHD female presenting for evaluation of right distal radius fracture. She fell on ice. She had a closed reduction in the emergency department. She still has significant pain, and now has worsening of her finger swelling and bruising.    Clinical documentation by  on 2/20/2023 was reviewed.    Interval 3/15/2023: reports improvement in her pain, no numbness/tingling, has been moving her fingers with the splint.    Interval 4/12/2023: minimal pain in the wrist, improvements in ROM, no numbness/tingling.     Interval 5/24/2023: reports no pain in the wrist, returning to most of her activities like gardening without pain or limitation. Sometimes feels tightness with extension stretches of her wrist.    Occupation: works at [employer]    Physical Examination:  There were no vitals filed for this visit.  There is no height or weight on file to calculate BMI.    Well appearing, well nourished  Alert and oriented x 3, cooperative with exam     Right wrist  Incision healed without evidence of infection  No tenderness to palpation about distal radius  Flexion 60, extension 50, supination 80, pronation 80  Motor Exam: Intact TU/OK/x2-3.  Sensory Exam: Sensation intact to light touch in FDWS (radial), volar IF (median), volar SF (ulnar)  Vascular Exam: 2+ radial pulse, < 2 sec capillary refill    Imaging/Studies:  XR (3 views) of the right wrist was obtained 5/24/2023.  I reviewed the images with the patient.  The imaging study shows distal radius fracture treated with plate and screw fixation. Increase in callus formation    XR (3 views) of the right  wrist was obtained 3/15/2023.  I reviewed the images with the patient.  The imaging study shows distal radius fracture treated with plate and screw fixation. Small amount of callus formation. No change in hardware    XR (3 views) of the right wrist was obtained 3/15/2023.  I reviewed the images with the patient.  The imaging study shows distal radius fracture treated with plate and screw fixation.    XR (3 views) of the right wrist was obtained 2/22/2023.  I reviewed the images with the patient.  The imaging study shows Comminuted extra-articular distal radius fracture with possible nondisplaced intra-articular split.  Dorsal angulation measuring about 13 degrees. Compared to postreduction films on 2/20/2023, there is an increase in dorsal angulation    Assessment: Deya Mantilla is a 67 year old female with right distal radius fracture. Now s/p right distal radius ORIF.    Plan:   I had a discussion with the patient regarding my clinical findings, diagnosis, and treatment plan. We reviewed the post-operative plan, frequency of follow-up, rehabilitation, and expected outcomes. Her fracture is clinically healed and healing radiographically. Increase activities as limited by pain. All questions answered.      WBAT right upper extremity.    Continue hand therapy exercises for ROM  - DEXA scan ordered, encouraged patient to schedule examination    Next Visit:    Follow-up: 2 months    Imaging: XR right wrist.    Plan: review imaging, advance activities.    FERDINAND COOPER MD

## 2023-05-24 ENCOUNTER — ANCILLARY PROCEDURE (OUTPATIENT)
Dept: GENERAL RADIOLOGY | Facility: CLINIC | Age: 68
End: 2023-05-24
Attending: STUDENT IN AN ORGANIZED HEALTH CARE EDUCATION/TRAINING PROGRAM
Payer: COMMERCIAL

## 2023-05-24 ENCOUNTER — OFFICE VISIT (OUTPATIENT)
Dept: ORTHOPEDICS | Facility: CLINIC | Age: 68
End: 2023-05-24
Payer: COMMERCIAL

## 2023-05-24 DIAGNOSIS — S52.531A CLOSED COLLES' FRACTURE OF RIGHT RADIUS, INITIAL ENCOUNTER: ICD-10-CM

## 2023-05-24 DIAGNOSIS — S52.531A CLOSED COLLES' FRACTURE OF RIGHT RADIUS, INITIAL ENCOUNTER: Primary | ICD-10-CM

## 2023-05-24 PROCEDURE — 73110 X-RAY EXAM OF WRIST: CPT | Mod: RT | Performed by: RADIOLOGY

## 2023-05-24 PROCEDURE — 99024 POSTOP FOLLOW-UP VISIT: CPT | Performed by: STUDENT IN AN ORGANIZED HEALTH CARE EDUCATION/TRAINING PROGRAM

## 2023-05-24 NOTE — LETTER
5/24/2023         RE: Deya Mantilla  [address]  [place] [address]        Dear Colleague,    Thank you for referring your patient, Deya Mantilla, to the [facility] [place]. Please see a copy of my visit note below.    Chief Complaint:   Chief Complaint   Patient presents with    Surgical Followup     12 wk POP right wrist ORIF DOS: 3/2/23       Date of Injury: 2/20/2023  Mechanism of Injury: Fall  Diagnosis: Right distal radius fracture  Surgery: 3/2/2023: right distal radius open reduction internal fixation    History of Present Illness: Deya Mantilla is a 67 year old RHD female presenting for evaluation of right distal radius fracture. She fell on ice. She had a closed reduction in the emergency department. She still has significant pain, and now has worsening of her finger swelling and bruising.    Clinical documentation by  on 2/20/2023 was reviewed.    Interval 3/15/2023: reports improvement in her pain, no numbness/tingling, has been moving her fingers with the splint.    Interval 4/12/2023: minimal pain in the wrist, improvements in ROM, no numbness/tingling.     Interval 5/24/2023: reports no pain in the wrist, returning to most of her activities like gardening without pain or limitation. Sometimes feels tightness with extension stretches of her wrist.    Occupation: works at [employer]    Physical Examination:  There were no vitals filed for this visit.  There is no height or weight on file to calculate BMI.    Well appearing, well nourished  Alert and oriented x 3, cooperative with exam     Right wrist  Incision healed without evidence of infection  No tenderness to palpation about distal radius  Flexion 60, extension 50, supination 80, pronation 80  Motor Exam: Intact TU/OK/x2-3.  Sensory Exam: Sensation intact to light touch in FDWS (radial), volar IF (median), volar SF (ulnar)  Vascular Exam: 2+ radial pulse, < 2  sec capillary refill    Imaging/Studies:  XR (3 views) of the right wrist was obtained 5/24/2023.  I reviewed the images with the patient.  The imaging study shows distal radius fracture treated with plate and screw fixation. Increase in callus formation    XR (3 views) of the right wrist was obtained 3/15/2023.  I reviewed the images with the patient.  The imaging study shows distal radius fracture treated with plate and screw fixation. Small amount of callus formation. No change in hardware    XR (3 views) of the right wrist was obtained 3/15/2023.  I reviewed the images with the patient.  The imaging study shows distal radius fracture treated with plate and screw fixation.    XR (3 views) of the right wrist was obtained 2/22/2023.  I reviewed the images with the patient.  The imaging study shows Comminuted extra-articular distal radius fracture with possible nondisplaced intra-articular split.  Dorsal angulation measuring about 13 degrees. Compared to postreduction films on 2/20/2023, there is an increase in dorsal angulation    Assessment: Deya Mantilla is a 67 year old female with right distal radius fracture. Now s/p right distal radius ORIF.    Plan:   I had a discussion with the patient regarding my clinical findings, diagnosis, and treatment plan. We reviewed the post-operative plan, frequency of follow-up, rehabilitation, and expected outcomes. Her fracture is clinically healed and healing radiographically. Increase activities as limited by pain. All questions answered.     WBAT right upper extremity.   Continue hand therapy exercises for ROM  - DEXA scan ordered, encouraged patient to schedule examination    Next Visit:   Follow-up: 2 months   Imaging: XR right wrist.   Plan: review imaging, advance activities.    FERDINAND COOPER MD

## 2023-05-24 NOTE — NURSING NOTE
Reason For Visit:   Chief Complaint   Patient presents with     Surgical Followup     12 wk POP right wrist ORIF DOS: 3/2/23       Primary MD: Kavita Lombardo  Ref. MD: SUNITA    Age: 67 year old    ?  No      LMP  (LMP Unknown)       Pain Assessment  Patient Currently in Pain: No    Hand Dominance Evaluation  Hand Dominance: Right          QuickDASH Assessment      4/12/2023    12:47 PM   QuickDASH Main   1. Open a tight or new jar Mild difficulty   2. Do heavy household chores (e.g., wash walls, floors) No difficulty   3. Carry a shopping bag or briefcase No difficulty   4. Wash your back No difficulty   5. Use a knife to cut food No difficulty   6. Recreational activities in which you take some force or impact through your arm, shoulder or hand (e.g., golf, hammering, tennis, etc.) Mild difficulty   7. During the past week, to what extent has your arm, shoulder or hand problem interfered with your normal social activities with family, friends, neighbours or groups Not at all   8. During the past week, were you limited in your work or other regular daily activities as a result of your arm, shoulder or hand problem Not limited at all   9. Arm, shoulder or hand pain Mild   10.Tingling (pins and needles) in your arm,shoulder or hand Mild   11. During the past week, how much difficulty have you had sleeping because of the pain in your arm, shoulder or hand No difficulty   Quickdash Ability Score 9.09          Current Outpatient Medications   Medication Sig Dispense Refill     buPROPion (WELLBUTRIN SR) 100 MG 12 hr tablet Take 1 tablet (100 mg) by mouth 2 times daily 180 tablet 3     buPROPion (WELLBUTRIN XL) 300 MG 24 hr tablet Take 1 tablet (300 mg) by mouth every morning (Patient taking differently: Take 300 mg by mouth every morning) 90 tablet 3     escitalopram (LEXAPRO) 10 MG tablet Take 1 tablet (10 mg) by mouth daily (Patient taking differently: Take 10 mg by mouth every morning) 90 tablet 3      hydrOXYzine (ATARAX) 25 MG tablet Take 1 tablet (25 mg) by mouth 3 times daily as needed for itching 30 tablet 0     hydrOXYzine (ATARAX) 25 MG tablet Take 1 tablet (25 mg) by mouth 3 times daily as needed (Pain) 30 tablet 0     MULTIVITAMIN TABS   OR Take by mouth daily (with lunch) With extra calcium and Vitamin D         Allergies   Allergen Reactions     Percocet [Oxycodone-Acetaminophen] Leandra Tijerina, AEMT

## 2023-07-13 DIAGNOSIS — S52.531A CLOSED COLLES' FRACTURE OF RIGHT RADIUS, INITIAL ENCOUNTER: Primary | ICD-10-CM

## 2023-07-24 NOTE — PROGRESS NOTES
Chief Complaint:   Chief Complaint   Patient presents with    RECHECK     5 month post op follow-up ORIF Right wrist DOS: 3/2/23         Date of Injury: 2/20/2023  Mechanism of Injury: Fall  Diagnosis: Right distal radius fracture  Surgery: 3/2/2023: right distal radius open reduction internal fixation    History of Present Illness: Deya Mantilla is a 68 year old RHD female presenting for evaluation of right distal radius fracture. She fell on ice. She had a closed reduction in the emergency department. She still has significant pain, and now has worsening of her finger swelling and bruising.    Clinical documentation by  on 2/20/2023 was reviewed.    Interval 3/15/2023: reports improvement in her pain, no numbness/tingling, has been moving her fingers with the splint.    Interval 4/12/2023: minimal pain in the wrist, improvements in ROM, no numbness/tingling.     Interval 5/24/2023: reports no pain in the wrist, returning to most of her activities like gardening without pain or limitation. Sometimes feels tightness with extension stretches of her wrist.    7/25/2023: reports no pain in the wrist, able to do all her activities without issues.     Occupation: works at [employer]    Physical Examination:  There were no vitals filed for this visit.  There is no height or weight on file to calculate BMI.    Well appearing, well nourished  Alert and oriented x 3, cooperative with exam     Right wrist  Incision healed without evidence of infection  No tenderness to palpation about distal radius  Flexion 70, extension 60, supination 80, pronation 80  Motor Exam: Intact TU/OK/x2-3.  Sensory Exam: Sensation intact to light touch in FDWS (radial), volar IF (median), volar SF (ulnar)  Vascular Exam: 2+ radial pulse, < 2 sec capillary refill    Imaging/Studies:  XR (3 views) of the right wrist was obtained 7/25/2023.  I reviewed the images with the patient.  The imaging study shows distal radius  fracture treated with plate and screw fixation. Fracture appears healed    XR (3 views) of the right wrist was obtained 5/24/2023.  I reviewed the images with the patient.  The imaging study shows distal radius fracture treated with plate and screw fixation. Increase in callus formation    XR (3 views) of the right wrist was obtained 3/15/2023.  I reviewed the images with the patient.  The imaging study shows distal radius fracture treated with plate and screw fixation. Small amount of callus formation. No change in hardware    XR (3 views) of the right wrist was obtained 3/15/2023.  I reviewed the images with the patient.  The imaging study shows distal radius fracture treated with plate and screw fixation.    XR (3 views) of the right wrist was obtained 2/22/2023.  I reviewed the images with the patient.  The imaging study shows Comminuted extra-articular distal radius fracture with possible nondisplaced intra-articular split.  Dorsal angulation measuring about 13 degrees. Compared to postreduction films on 2/20/2023, there is an increase in dorsal angulation    Assessment: Deya Mantilla is a 68 year old female with right distal radius fracture. Now s/p right distal radius ORIF.    Plan:   I had a discussion with the patient regarding my clinical findings, diagnosis, and treatment plan. We reviewed the post-operative plan, frequency of follow-up, rehabilitation, and expected outcomes. Her fracture is clinically healed and healing radiographically. Increase activities as limited by pain. All questions answered.     WBAT right upper extremity.   Continue home therapy exercises for ROM  - DEXA scan ordered, encouraged patient to schedule examination    Next Visit:   Follow-up: as needed    FERDINAND COOPER MD

## 2023-07-25 ENCOUNTER — OFFICE VISIT (OUTPATIENT)
Dept: ORTHOPEDICS | Facility: CLINIC | Age: 68
End: 2023-07-25
Payer: COMMERCIAL

## 2023-07-25 ENCOUNTER — ANCILLARY PROCEDURE (OUTPATIENT)
Dept: GENERAL RADIOLOGY | Facility: CLINIC | Age: 68
End: 2023-07-25
Attending: STUDENT IN AN ORGANIZED HEALTH CARE EDUCATION/TRAINING PROGRAM
Payer: COMMERCIAL

## 2023-07-25 DIAGNOSIS — S52.531A CLOSED COLLES' FRACTURE OF RIGHT RADIUS, INITIAL ENCOUNTER: ICD-10-CM

## 2023-07-25 DIAGNOSIS — S52.531A CLOSED COLLES' FRACTURE OF RIGHT RADIUS, INITIAL ENCOUNTER: Primary | ICD-10-CM

## 2023-07-25 PROCEDURE — 73110 X-RAY EXAM OF WRIST: CPT | Mod: RT | Performed by: RADIOLOGY

## 2023-07-25 PROCEDURE — 99213 OFFICE O/P EST LOW 20 MIN: CPT | Performed by: STUDENT IN AN ORGANIZED HEALTH CARE EDUCATION/TRAINING PROGRAM

## 2023-07-25 NOTE — NURSING NOTE
Reason For Visit:   Chief Complaint   Patient presents with    RECHECK     5 month post op follow-up ORIF Right wrist DOS: 3/2/23         Primary MD: Kavita Lombardo  Ref. MD: Pratima    Age: 68 year old    ?  No      LMP  (LMP Unknown)       Pain Assessment  Patient Currently in Pain: No (patient denies any right wrist pain)    Hand Dominance Evaluation  Hand Dominance: Right          QuickDASH Assessment      4/12/2023    12:47 PM   QuickDASH Main   1. Open a tight or new jar Mild difficulty   2. Do heavy household chores (e.g., wash walls, floors) No difficulty   3. Carry a shopping bag or briefcase No difficulty   4. Wash your back No difficulty   5. Use a knife to cut food No difficulty   6. Recreational activities in which you take some force or impact through your arm, shoulder or hand (e.g., golf, hammering, tennis, etc.) Mild difficulty   7. During the past week, to what extent has your arm, shoulder or hand problem interfered with your normal social activities with family, friends, neighbours or groups Not at all   8. During the past week, were you limited in your work or other regular daily activities as a result of your arm, shoulder or hand problem Not limited at all   9. Arm, shoulder or hand pain Mild   10.Tingling (pins and needles) in your arm,shoulder or hand Mild   11. During the past week, how much difficulty have you had sleeping because of the pain in your arm, shoulder or hand No difficulty   Quickdash Ability Score 9.09          Current Outpatient Medications   Medication Sig Dispense Refill    buPROPion (WELLBUTRIN SR) 100 MG 12 hr tablet Take 1 tablet (100 mg) by mouth 2 times daily 180 tablet 3    buPROPion (WELLBUTRIN XL) 300 MG 24 hr tablet Take 1 tablet (300 mg) by mouth every morning (Patient taking differently: Take 300 mg by mouth every morning) 90 tablet 3    escitalopram (LEXAPRO) 10 MG tablet Take 1 tablet (10 mg) by mouth daily (Patient taking differently: Take 10 mg by  mouth every morning) 90 tablet 3    hydrOXYzine (ATARAX) 25 MG tablet Take 1 tablet (25 mg) by mouth 3 times daily as needed for itching 30 tablet 0    hydrOXYzine (ATARAX) 25 MG tablet Take 1 tablet (25 mg) by mouth 3 times daily as needed (Pain) 30 tablet 0    MULTIVITAMIN TABS   OR Take by mouth daily (with lunch) With extra calcium and Vitamin D         Allergies   Allergen Reactions    Percocet [Oxycodone-Acetaminophen] ASHLEY Castle, ATC

## 2023-11-20 ENCOUNTER — OFFICE VISIT (OUTPATIENT)
Dept: FAMILY MEDICINE | Facility: CLINIC | Age: 68
End: 2023-11-20
Payer: COMMERCIAL

## 2023-11-20 VITALS
SYSTOLIC BLOOD PRESSURE: 108 MMHG | OXYGEN SATURATION: 97 % | HEART RATE: 82 BPM | DIASTOLIC BLOOD PRESSURE: 64 MMHG | RESPIRATION RATE: 18 BRPM | BODY MASS INDEX: 16.59 KG/M2 | HEIGHT: 66 IN | TEMPERATURE: 97.5 F | WEIGHT: 103.2 LBS

## 2023-11-20 DIAGNOSIS — Z13.1 SCREENING FOR DIABETES MELLITUS: ICD-10-CM

## 2023-11-20 DIAGNOSIS — Z78.0 ASYMPTOMATIC MENOPAUSAL STATE: ICD-10-CM

## 2023-11-20 DIAGNOSIS — Z23 NEED FOR TDAP VACCINATION: ICD-10-CM

## 2023-11-20 DIAGNOSIS — F33.1 MAJOR DEPRESSIVE DISORDER, RECURRENT EPISODE, MODERATE (H): ICD-10-CM

## 2023-11-20 DIAGNOSIS — Z00.00 ENCOUNTER FOR MEDICARE ANNUAL WELLNESS EXAM: Primary | ICD-10-CM

## 2023-11-20 DIAGNOSIS — I83.812 VARICOSE VEINS OF LEFT LOWER EXTREMITY WITH PAIN: ICD-10-CM

## 2023-11-20 DIAGNOSIS — Z12.31 VISIT FOR SCREENING MAMMOGRAM: ICD-10-CM

## 2023-11-20 DIAGNOSIS — Z13.220 SCREENING CHOLESTEROL LEVEL: ICD-10-CM

## 2023-11-20 DIAGNOSIS — Z29.11 NEED FOR VACCINATION AGAINST RESPIRATORY SYNCYTIAL VIRUS: ICD-10-CM

## 2023-11-20 DIAGNOSIS — F41.9 ANXIETY: ICD-10-CM

## 2023-11-20 LAB
ANION GAP SERPL CALCULATED.3IONS-SCNC: 8 MMOL/L (ref 7–15)
BUN SERPL-MCNC: 19.8 MG/DL (ref 8–23)
CALCIUM SERPL-MCNC: 9 MG/DL (ref 8.8–10.2)
CHLORIDE SERPL-SCNC: 101 MMOL/L (ref 98–107)
CHOLEST SERPL-MCNC: 203 MG/DL
CREAT SERPL-MCNC: 0.67 MG/DL (ref 0.51–0.95)
DEPRECATED HCO3 PLAS-SCNC: 28 MMOL/L (ref 22–29)
EGFRCR SERPLBLD CKD-EPI 2021: >90 ML/MIN/1.73M2
GLUCOSE SERPL-MCNC: 88 MG/DL (ref 70–99)
HDLC SERPL-MCNC: 70 MG/DL
LDLC SERPL CALC-MCNC: 122 MG/DL
NONHDLC SERPL-MCNC: 133 MG/DL
POTASSIUM SERPL-SCNC: 4.7 MMOL/L (ref 3.4–5.3)
SODIUM SERPL-SCNC: 137 MMOL/L (ref 135–145)
TRIGL SERPL-MCNC: 54 MG/DL

## 2023-11-20 PROCEDURE — 96127 BRIEF EMOTIONAL/BEHAV ASSMT: CPT | Performed by: NURSE PRACTITIONER

## 2023-11-20 PROCEDURE — 99214 OFFICE O/P EST MOD 30 MIN: CPT | Mod: 25 | Performed by: NURSE PRACTITIONER

## 2023-11-20 PROCEDURE — 80061 LIPID PANEL: CPT | Performed by: NURSE PRACTITIONER

## 2023-11-20 PROCEDURE — 90480 ADMN SARSCOV2 VAC 1/ONLY CMP: CPT | Performed by: NURSE PRACTITIONER

## 2023-11-20 PROCEDURE — 99397 PER PM REEVAL EST PAT 65+ YR: CPT | Mod: 25 | Performed by: NURSE PRACTITIONER

## 2023-11-20 PROCEDURE — 90471 IMMUNIZATION ADMIN: CPT | Performed by: NURSE PRACTITIONER

## 2023-11-20 PROCEDURE — 90662 IIV NO PRSV INCREASED AG IM: CPT | Performed by: NURSE PRACTITIONER

## 2023-11-20 PROCEDURE — 36415 COLL VENOUS BLD VENIPUNCTURE: CPT | Performed by: NURSE PRACTITIONER

## 2023-11-20 PROCEDURE — 80048 BASIC METABOLIC PNL TOTAL CA: CPT | Performed by: NURSE PRACTITIONER

## 2023-11-20 PROCEDURE — 91320 SARSCV2 VAC 30MCG TRS-SUC IM: CPT | Performed by: NURSE PRACTITIONER

## 2023-11-20 RX ORDER — RESPIRATORY SYNCYTIAL VIRUS VACCINE 120MCG/0.5
0.5 KIT INTRAMUSCULAR ONCE
Qty: 1 EACH | Refills: 0 | Status: SHIPPED | OUTPATIENT
Start: 2023-11-20 | End: 2023-11-20

## 2023-11-20 RX ORDER — BUPROPION HYDROCHLORIDE 300 MG/1
300 TABLET ORAL EVERY MORNING
Qty: 90 TABLET | Refills: 3 | Status: CANCELLED | OUTPATIENT
Start: 2023-11-20

## 2023-11-20 RX ORDER — BUPROPION HYDROCHLORIDE 100 MG/1
100 TABLET, EXTENDED RELEASE ORAL 2 TIMES DAILY
Qty: 180 TABLET | Refills: 3 | Status: SHIPPED | OUTPATIENT
Start: 2023-11-20

## 2023-11-20 RX ORDER — ESCITALOPRAM OXALATE 10 MG/1
10 TABLET ORAL EVERY MORNING
Qty: 90 TABLET | Refills: 3 | Status: SHIPPED | OUTPATIENT
Start: 2023-11-20

## 2023-11-20 ASSESSMENT — ENCOUNTER SYMPTOMS
WEAKNESS: 0
DIARRHEA: 0
CONSTIPATION: 0
HEADACHES: 0
CHILLS: 0
EYE PAIN: 0
ABDOMINAL PAIN: 0
NERVOUS/ANXIOUS: 1
HEMATURIA: 0
COUGH: 0
FREQUENCY: 0
PALPITATIONS: 0
JOINT SWELLING: 0
MYALGIAS: 0
PARESTHESIAS: 0
SORE THROAT: 0
NAUSEA: 0
BREAST MASS: 0
DYSURIA: 0
FATIGUE: 1
DIZZINESS: 0
HEMATOCHEZIA: 0
SHORTNESS OF BREATH: 0
HEARTBURN: 0
FEVER: 0
ARTHRALGIAS: 0

## 2023-11-20 ASSESSMENT — PATIENT HEALTH QUESTIONNAIRE - PHQ9
10. IF YOU CHECKED OFF ANY PROBLEMS, HOW DIFFICULT HAVE THESE PROBLEMS MADE IT FOR YOU TO DO YOUR WORK, TAKE CARE OF THINGS AT HOME, OR GET ALONG WITH OTHER PEOPLE: NOT DIFFICULT AT ALL
SUM OF ALL RESPONSES TO PHQ QUESTIONS 1-9: 2
SUM OF ALL RESPONSES TO PHQ QUESTIONS 1-9: 2

## 2023-11-20 ASSESSMENT — ACTIVITIES OF DAILY LIVING (ADL): CURRENT_FUNCTION: NO ASSISTANCE NEEDED

## 2023-11-20 ASSESSMENT — PAIN SCALES - GENERAL: PAINLEVEL: NO PAIN (0)

## 2023-11-20 NOTE — PATIENT INSTRUCTIONS
[facility] [phone]       Patient Education   Personalized Prevention Plan  You are due for the preventive services outlined below.  Your care team is available to assist you in scheduling these services.  If you have already completed any of these items, please share that information with your care team to update in your medical record.  Health Maintenance Due   Topic Date Due    RSV VACCINE (Pregnancy & 60+) (1 - 1-dose 60+ series) Never done    Diptheria Tetanus Pertussis (DTAP/TDAP/TD) Vaccine (2 - Td or Tdap) 01/05/2022    Pneumococcal Vaccine (2 - PCV) 10/25/2022    Flu Vaccine (1) 09/01/2023    COVID-19 Vaccine (5 - 2023-24 season) 09/01/2023    ANNUAL REVIEW OF HM ORDERS  11/16/2023    Mammogram  11/05/2023    Annual Wellness Visit  11/16/2023     Learning About Dietary Guidelines  What are the Dietary Guidelines for Americans?     Dietary Guidelines for Americans provide tips for eating well and staying healthy. This helps reduce the risk for long-term (chronic) diseases.  These guidelines recommend that you:  Eat and drink the right amount for you. The U.[place] government's food guide is called MyPlate. It can help you make your own well-balanced eating plan.  Try to balance your eating with your activity. This helps you stay at a healthy weight.  Drink alcohol in moderation, if at all.  Limit foods high in salt, saturated fat, trans fat, and added sugar.  These guidelines are from the U.S. Department of Agriculture and the U.S. Department of Health and Human Services. They are updated every 5 years.  What is MyPlate?  MyPlate is the U.[place] government's food guide. It can help you make your own well-balanced eating plan. A balanced eating plan means that you eat enough, but not too much, and that your food gives you the nutrients you need to stay healthy.  MyPlate focuses on eating plenty of whole grains, fruits, and vegetables, and on limiting fat and sugar. It is available online at  "www.ChooseMyPlate.gov.  How can you get started?  If you're trying to eat healthier, you can slowly change your eating habits over time. You don't have to make big changes all at once. Start by adding one or two healthy foods to your meals each day.  Grains  Choose whole-grain breads and cereals and whole-wheat pasta and whole-grain crackers.  Vegetables  Eat a variety of vegetables every day. They have lots of nutrients and are part of a heart-healthy diet.  Fruits  Eat a variety of fruits every day. Fruits contain lots of nutrients. Choose fresh fruit instead of fruit juice.  Protein foods  Choose fish and lean poultry more often. Eat red meat and fried meats less often. Dried beans, tofu, and nuts are also good sources of protein.  Dairy  Choose low-fat or fat-free products from this food group. If you have problems digesting milk, try eating cheese or yogurt instead.  Fats and oils  Limit fats and oils if you're trying to cut calories. Choose healthy fats when you cook. These include canola oil and olive oil.  Where can you learn more?  Go to https://www.[employer].net/patiented  Enter D676 in the search box to learn more about \"Learning About Dietary Guidelines.\"  Current as of: February 28, 2023               Content Version: 13.8    8823-5000 [employer].   Care instructions adapted under license by your healthcare professional. If you have questions about a medical condition or this instruction, always ask your healthcare professional. [employer] disclaims any warranty or liability for your use of this information.      Preventing Falls: Care Instructions  Injuries and health problems such as trouble walking or poor eyesight can increase your risk of falling. So can some medicines. But there are things you can do to help prevent falls. You can exercise to get stronger. You can also arrange your home to make it safer.    Talk to your doctor about the medicines you take. Ask if any " "of them increase the risk of falls and whether they can be changed or stopped.   Try to exercise regularly. It can help improve your strength and balance. This can help lower your risk of falling.     Practice fall safety and prevention.    Wear low-heeled shoes that fit well and give your feet good support. Talk to your doctor if you have foot problems that make this hard.  Carry a cellphone or wear a medical alert device that you can use to call for help.  Use stepladders instead of chairs to reach high objects. Don't climb if you're at risk for falls. Ask for help, if needed.  Wear the correct eyeglasses, if you need them.    Make your home safer.    Remove rugs, cords, clutter, and furniture from walkways.  Keep your house well lit. Use night-lights in hallways and bathrooms.  Install and use sturdy handrails on stairways.  Wear nonskid footwear, even inside. Don't walk barefoot or in socks without shoes.    Be safe outside.    Use handrails, curb cuts, and ramps whenever possible.  Keep your hands free by using a shoulder bag or backpack.  Try to walk in well-lit areas. Watch out for uneven ground, changes in pavement, and debris.  Be careful in the winter. Walk on the grass or gravel when sidewalks are slippery. Use de-icer on steps and walkways. Add non-slip devices to shoes.    Put grab bars and nonskid mats in your shower or tub and near the toilet. Try to use a shower chair or bath bench when bathing.   Get into a tub or shower by putting in your weaker leg first. Get out with your strong side first. Have a phone or medical alert device in the bathroom with you.   Where can you learn more?  Go to https://www.[employer].net/patiented  Enter G117 in the search box to learn more about \"Preventing Falls: Care Instructions.\"  Current as of: July 18, 2023               Content Version: 13.8    9549-8194 [employer].   Care instructions adapted under license by your healthcare professional. If you " have questions about a medical condition or this instruction, always ask your healthcare professional. Healthwise, [facility] disclaims any warranty or liability for your use of this information.      How to Get Up Safely After a Fall: Care Instructions  Overview     If you have injuries, health problems, or other reasons that may make it easy for you to fall at home, it is a good idea to learn how to get up safely after a fall. Learning how to get up correctly can help you avoid making an injury worse.  Also, knowing what to do if you cannot get up can help you stay safe until help arrives.  Follow-up care is a key part of your treatment and safety. Be sure to make and go to all appointments, and call your doctor if you are having problems. It's also a good idea to know your test results and keep a list of the medicines you take.  How can you care for yourself after a fall?  If you think you can get up  First lie still for a few minutes and think about how you feel. If your body feels okay and you think you can get up safely, follow the rest of the steps below:  Look for a chair or other piece of furniture that is close to you.  Roll onto your side and rest. Roll by turning your head in the direction you want to roll, move your shoulder and arm, then hip and leg in the same direction.  Lie still for a moment to let your blood pressure adjust.  Slowly push your upper body up, lift your head, and take a moment to rest.  Slowly get up on your hands and knees, and crawl to the chair or other stable piece of furniture.  Put your hands on the chair.  Move one foot forward, and place it flat on the floor. Your other leg should be bent with the knee on the floor.  Rise slowly, turn your body, and sit in the chair. Stay seated for a bit and think about how you feel. Call for help. Even if you feel okay, let someone know what happened to you. You might not know that you have a serious injury.  If you cannot get up  If you  "think you are injured after a fall or you cannot get up, try not to panic.  Call out for help.  If you have a phone within reach or you have an emergency call device, use it to call for help.  If you do not have a phone within reach, try to slide yourself toward it. If you cannot get to the phone, try to slide toward a door or window or a place where you think you can be heard.  [place] or use an object to make noise so someone might hear you.  If you can reach something that you can use for a pillow, place it under your head. Try to stay warm by covering yourself with a blanket or clothing while you wait for help.  When should you call for help?   Call 911 anytime you think you may need emergency care. For example, call if:    You passed out (lost consciousness).     You cannot get up after a fall.     You have severe pain.   Call your doctor now or seek immediate medical care if:    You have new or worse pain.     You are dizzy or lightheaded.     You hit your head.   Watch closely for changes in your health, and be sure to contact your doctor if:    You do not get better as expected.   Where can you learn more?  Go to https://www.[employer].net/patiented  Enter G513 in the search box to learn more about \"How to Get Up Safely After a Fall: Care Instructions.\"  Current as of: November 13, 2022               Content Version: 13.8    1188-2049 [employer].   Care instructions adapted under license by your healthcare professional. If you have questions about a medical condition or this instruction, always ask your healthcare professional. [employer] disclaims any warranty or liability for your use of this information.         "

## 2023-11-20 NOTE — PROGRESS NOTES
"SUBJECTIVE:   Deya is a 68 year old, presenting for the following:  Physical, Broken arm f/u- still gets stiff, Fatigue, and request (DEXA scan)        11/20/2023     8:13 AM   Additional Questions   Roomed by Cecille BLANCO RN   Accompanied by None         11/20/2023     8:13 AM   Patient Reported Additional Medications   Patient reports taking the following new medications None       Are you in the first 12 months of your Medicare coverage? No  Healthy Habits:     In general, how would you rate your overall health?  Good    Frequency of exercise:  6-7 days/week    Duration of exercise:  30-45 minutes    Do you usually eat at least 4 servings of fruit and vegetables a day, include whole grains    & fiber and avoid regularly eating high fat or \"junk\" foods?  No    Taking medications regularly:  No    Barriers to taking medications:  Problems remembering to take them    Medication side effects:  None    Ability to successfully perform activities of daily living:  No assistance needed    Home Safety:  Throw rugs in the hallway and lack of grab bars in the bathroom    Hearing Impairment:  No hearing concerns    In the past 6 months, have you been bothered by leaking of urine?  No    In general, how would you rate your overall mental or emotional health?  Good    Additional concerns today:  Yes  Fatigue  Associated symptoms include congestion and fatigue. Pertinent negatives include no abdominal pain, arthralgias, chest pain, chills, coughing, fever, headaches, joint swelling, myalgias, nausea, rash, sore throat or weakness.     She has been more bothered by her left leg varicose veins.  Itchy at times.     Mood has been stable.  She is doing well on her current dose of Lexapro and Wellbutrin.     The 10-year ASCVD risk score (Shutesbury DK, et al., 2019) is: 4.6%    Values used to calculate the score:      Age: 68 years      Sex: Female      Is Non- : No      Diabetic: No      Tobacco smoker: No      " Systolic Blood Pressure: 108 mmHg      Is BP treated: No      HDL Cholesterol: 85 mg/dL      Total Cholesterol: 229 mg/dL    Today's PHQ-9 Score:       2023     7:56 AM   PHQ-9 SCORE   PHQ-9 Total Score MyChart 2 (Minimal depression)   PHQ-9 Total Score 2           Have you ever done Advance Care Planning? (For example, a Health Directive, POLST, or a discussion with a medical provider or your loved ones about your wishes): Yes, patient states has an Advance Care Planning document and will bring a copy to the clinic.      Fall risk  Fallen 2 or more times in the past year?: Yes  Any fall with injury in the past year?: Yes      TU Seconds      Cognitive Screening   1) Repeat 3 items (Banana, sunrise, chair)  2) Clock draw: NORMAL  3) 3 item recall: Recalls 3 objects  Results: 3 items recalled: COGNITIVE IMPAIRMENT LESS LIKELY    Mini-CogTM Copyright S Vaughn. Licensed by the author for use in [facility]; reprinted with permission (rima@[facility]). All rights reserved.      Do you have sleep apnea, excessive snoring or daytime drowsiness? : yes    Reviewed and updated as needed this visit by clinical staff   Tobacco  Allergies  Meds              Reviewed and updated as needed this visit by Provider                 Social History     Tobacco Use    Smoking status: Never    Smokeless tobacco: Never   Substance Use Topics    Alcohol use: No     Comment: Social, very rare             2023     8:00 AM   Alcohol Use   Prescreen: >3 drinks/day or >7 drinks/week? No     Do you have a current opioid prescription? No  Do you use any other controlled substances or medications that are not prescribed by a provider? None              Current providers sharing in care for this patient include:   Patient Care Team:  Kavita Lombardo NP as PCP - General (Family Practice)  Kavita Lombardo NP as Assigned PCP  Dotty Mehta MD as Assigned Musculoskeletal Provider  Fior Sommer PA-C as  Assigned Surgical Provider    The following health maintenance items are reviewed in Epic and correct as of today:  Health Maintenance   Topic Date Due    RSV VACCINE (Pregnancy & 60+) (1 - 1-dose 60+ series) Never done    DTAP/TDAP/TD IMMUNIZATION (2 - Td or Tdap) 01/05/2022    Pneumococcal Vaccine: 65+ Years (2 - PCV) 10/25/2022    INFLUENZA VACCINE (1) 09/01/2023    COVID-19 Vaccine (5 - 2023-24 season) 09/01/2023    ANNUAL REVIEW OF HM ORDERS  11/16/2023    MAMMO SCREENING  11/05/2023    MEDICARE ANNUAL WELLNESS VISIT  11/16/2023    PHQ-9  05/20/2024    FALL RISK ASSESSMENT  11/20/2024    COLORECTAL CANCER SCREENING  12/01/2025    LIPID  11/16/2027    ADVANCE CARE PLANNING  11/16/2027    DEXA  06/18/2034    HEPATITIS C SCREENING  Completed    DEPRESSION ACTION PLAN  Completed    MIGRAINE ACTION PLAN  Completed    ZOSTER IMMUNIZATION  Completed    IPV IMMUNIZATION  Aged Out    HPV IMMUNIZATION  Aged Out    MENINGITIS IMMUNIZATION  Aged Out    RSV MONOCLONAL ANTIBODY  Aged Out    PAP  Discontinued               10/24/2021     2:53 PM 11/5/2021     9:44 AM   Breast CA Risk Assessment (FHS-7)   Do you have a family history of breast, colon, or ovarian cancer? No / Unknown No / Unknown           Pertinent mammograms are reviewed under the imaging tab.    Review of Systems   Constitutional:  Positive for fatigue. Negative for chills and fever.   HENT:  Positive for congestion. Negative for ear pain, hearing loss and sore throat.    Eyes:  Negative for pain and visual disturbance.   Respiratory:  Negative for cough and shortness of breath.    Cardiovascular:  Negative for chest pain, palpitations and peripheral edema.   Gastrointestinal:  Negative for abdominal pain, constipation, diarrhea, heartburn, hematochezia and nausea.   Breasts:  Negative for tenderness, breast mass and discharge.   Genitourinary:  Negative for dysuria, frequency, genital sores, hematuria, pelvic pain, urgency, vaginal bleeding and vaginal  "discharge.   Musculoskeletal:  Negative for arthralgias, joint swelling and myalgias.   Skin:  Negative for rash.   Neurological:  Negative for dizziness, weakness, headaches and paresthesias.   Psychiatric/Behavioral:  Negative for mood changes. The patient is nervous/anxious.          OBJECTIVE:   /64 (BP Location: Right arm, Patient Position: Sitting, Cuff Size: Adult Regular)   Pulse 82   Temp 97.5  F (36.4  C) (Temporal)   Resp 18   Ht 1.682 m (5' 6.22\")   Wt 46.8 kg (103 lb 3.2 oz)   LMP  (LMP Unknown)   SpO2 97%   BMI 16.55 kg/m   Estimated body mass index is 16.55 kg/m  as calculated from the following:    Height as of this encounter: 1.682 m (5' 6.22\").    Weight as of this encounter: 46.8 kg (103 lb 3.2 oz).  Physical Exam  GENERAL: healthy, alert and no distress  EYES: Eyes grossly normal to inspection, PERRL and conjunctivae and sclerae normal  HENT: ear canals and TM's normal, nose and mouth without ulcers or lesions  NECK: no adenopathy, no asymmetry, masses, or scars and thyroid normal to palpation  RESP: lungs clear to auscultation - no rales, rhonchi or wheezes  CV: regular rate and rhythm, normal S1 S2, no S3 or S4, no murmur, click or rub, no peripheral edema and peripheral pulses strong  ABDOMEN: soft, nontender, no hepatosplenomegaly, no masses and bowel sounds normal  MS: no gross musculoskeletal defects noted, no edema  SKIN: no suspicious lesions or rashes  NEURO: Normal strength and tone, mentation intact and speech normal  PSYCH: mentation appears normal, affect normal/bright        ASSESSMENT / PLAN:   (Z00.00) Encounter for Medicare annual wellness exam  (primary encounter diagnosis)  Comment:   Plan:     (F33.1) Major depressive disorder, recurrent episode, moderate (H)  Comment: stable  Plan: buPROPion (WELLBUTRIN SR) 100 MG 12 hr tablet,         escitalopram (LEXAPRO) 10 MG tablet        The current medical regimen is effective;  continue present plan and medications. "     (F41.9) Anxiety  Comment: stable  Plan: escitalopram (LEXAPRO) 10 MG tablet        The current medical regimen is effective;  continue present plan and medications.     (Z23) Need for Tdap vaccination  Comment:   Plan: Tdap, tetanus-diptheria-acell pertussis,         (BOOSTRIX) 5-2.5-18.5 LF-MCG/0.5 ELMER injection        Get at pharmacy.     (Z29.11) Need for vaccination against respiratory syncytial virus  Comment:   Plan: respiratory syncytial virus vaccine, bivalent         (ABRYSVO) injection        Get at pharmacy.     (Z12.31) Visit for screening mammogram  Comment:   Plan: MA SCREENING DIGITAL BILAT - Future  (s+30)            (I83.812) Varicose veins of left lower extremity with pain  Comment:   Plan: Vascular Surgery Referral        REferral to vein specialist given.     (Z78.0) Asymptomatic menopausal state  Comment:   Plan: DX Hip/Pelvis/Spine            (Z13.220) Screening cholesterol level  Comment:   Plan: Lipid panel reflex to direct LDL Fasting            (Z13.1) Screening for diabetes mellitus  Comment:   Plan: Basic metabolic panel  (Ca, Cl, CO2, Creat,         Gluc, K, Na, BUN)            Patient has been advised of split billing requirements and indicates understanding: Yes      COUNSELING:  Reviewed preventive health counseling, as reflected in patient instructions        She reports that she has never smoked. She has never used smokeless tobacco.      Appropriate preventive services were discussed with this patient, including applicable screening as appropriate for fall prevention, nutrition, physical activity, Tobacco-use cessation, weight loss and cognition.  Checklist reviewing preventive services available has been given to the patient.    Reviewed patients plan of care and provided an AVS. The Basic Care Plan (routine screening as documented in Health Maintenance) for Deya meets the Care Plan requirement. This Care Plan has been established and reviewed with the  Patient.          Kavita Lombardo NP  [facility]    Identified Health Risks:    Answers submitted by the patient for this visit:  Patient Health Questionnaire (Submitted on 11/20/2023)  If you checked off any problems, how difficult have these problems made it for you to do your work, take care of things at home, or get along with other people?: Not difficult at all  PHQ9 TOTAL SCORE: 2

## 2023-11-22 NOTE — PATIENT INSTRUCTIONS
Deya    Thank you for entrusting your care with us at the [facility].      We are prescribing some compression stockings for you. I have included different suppliers that should help you get measured and fitting to ensure proper fitting socks. You should wear these stockings as much as you can. It is especially important to wear them with long periods of standing, sitting, long car rides or if you will be flying. Compression socks should get refilled every 4-6 months. They do not need to be worn at night while in bed. It is recommended to wear compression level of 20-30mmhg or higher from toes to knees.      We will perform an ultrasound prior to your appointment with us in 3 months time to evaluate the valves in your veins.      We would like you to follow up in 3 months after wearing socks to see how you are doing.        Varicose Veins    Varicose veins are twisted, enlarged veins near the surface of the skin. They develop most often in the legs and ankles.    Some people may be more likely than others to get varicose veins because of several things. These include aging, pregnancy, being overweight, or because a parent has them. Standing or sitting for long periods of time can also increase risk of varicose veins.    Follow-up care is a key part of your treatment and safety. Be sure to make and go to all appointments, and call your doctor if you are having problems. It's also a good idea to know your test results and keep a list of the medicines you take.      Varicose veins are caused by weakened valves and veins in your legs. Normally, one-way valves in your veins keep blood flowing from your legs up toward your heart. When these valves don't work as they should, blood collects in your legs, and pressure builds up. The veins become weak, large, and twisted.    How can you care for yourself at home?  Wear compression stockings during the day to help relieve symptoms and improve blood flow.  Talk to your doctor about which ones to get and where to get them.  Prop up your legs at or above the level of your heart when possible. Try to do this for about 30 minutes at a time, about 3 times a day. This helps keep the blood from pooling in your lower legs and improves blood flow to the rest of your body.  Avoid sitting and standing for long periods. This puts added stress on your veins.  Stay at a healthy weight. Lose weight if you need to.  Try to take several short walks every day.  Get at least 30 minutes of exercise on most days of the week. Walking is a good choice. You also may want to do other activities, such as running, swimming, cycling, or playing tennis or team sports.  Do calf muscle exercises every day. When you are sitting down, rotate your feet at the ankles in both directions, making small circles. Extend your legs, and point and flex your feet.  Avoid crossing your legs at the knees when sitting.  Take good care of your skin. Treat cuts and scrapes on your legs right away. Keep your legs clean and moisturized to prevent drying and cracking. Prevent sunburns.  Do not smoke. Smoking can make varicose veins worse. If you need help quitting, talk to your doctor about stop-smoking programs and medicines. These can increase your chances of quitting for good.  If you bump your leg so hard that you know it is likely to bruise, prop up your leg and apply ice or cold packs right away. Apply the ice or cold pack for 10 to 20 minutes, 3 or more times a day. Put a thin cloth between the ice and your skin.  If you cut or scratch the skin over a vein, it may bleed a lot. Prop up your leg and apply firm pressure for a full 15 minutes.  If you have a blood clot in a varicose vein, you may have tenderness and swelling over the vein. The vein may feel firm. Be sure to call your doctor right away if you have these symptoms. If your doctor has told you how to care for the clot, follow the instructions.     Care  may include the following:    Prop up your leg and apply a damp cloth that is warm or cool.  Ask your doctor if you can take an over-the-counter pain medicine, such as acetaminophen (Tylenol), ibuprofen (Advil, Motrin), or naproxen (Aleve). Be safe with medicines. Read and follow all instructions on the label.    When should you call for help?     Call 911 anytime you think you may need emergency care. For example, call if:    You have sudden chest pain and shortness of breath, or you cough up blood.    Call your doctor now or seek immediate medical care if:    You have signs of a blood clot in your leg (called a deep vein thrombosis), such as:  Pain in your calf, back of the knee, thigh, or groin.  Swelling in the leg or groin.  A color change on the leg or groin. The skin may be reddish or purplish, depending on your usual skin color.  A varicose vein begins to bleed and you cannot stop it.  You have a tender lump in your leg.  You get an open sore.    Watch closely for changes in your health, and be sure to contact your doctor if:    Your varicose vein symptoms do not improve with home treatment.    Current as of: December 19, 2022  Author: Healthwise Staff  Medical Review:Rajesh Kirkland MD - Family Medicine & COLLEEN Tarango MD - Internal Medicine & Larry Angel MD - Family Medicine & John Tuttle MD - Family Medicine & Ganga Dhillon MD - Diagnostic Radiology    Please bring your compression prescription to a home medical supply store. Here is a list of locations but not limited to.     [facility]  [address] [place] [address]  Phone: [phone]  Fax: [phone] [facility]  [address] [place] [address]  Phone: [phone]  Fax: [phone]   [facility]  [address]  [place] [address]  Phone: [phone]  Fax: [phone] [facility]  [address] [place] [address]  Phone: [phone]  Fax: [phone]   [facility]  [address] [place] [address]  Phone: [phone] [facility]   [address] ([facility])  [place] [address]  Phone: [phone]   [place]  [address] [place] [address]      Phone: [phone]  Fax: [phone] [place]  [address] [place] [address]      Phone: [phone]  Fax: [phone]     [facility] [email or web address]  [address], [place] [address]  [phone]    [facility]  [email or web address]  [address] [place] [address]  Phone: [phone]     [address] [place] [address]  Phone: [phone]    [address] [place] [address]  Phone: [phone] [address], [place] [address]  Phone: [phone] [address], [place] [address]  Phone: [phone]    [facility] [email or web address]  [address], [place] [address]  Phone: [phone]    [address], [place] [address]  Phone: [phone]    [address], [place] [address]  Phone: [phone]    Efra Lozoya  [email or web address]  [phone]

## 2023-11-27 ENCOUNTER — TRANSFERRED RECORDS (OUTPATIENT)
Dept: MULTI SPECIALTY CLINIC | Facility: CLINIC | Age: 68
End: 2023-11-27
Payer: COMMERCIAL

## 2023-11-28 ENCOUNTER — TRANSFERRED RECORDS (OUTPATIENT)
Dept: HEALTH INFORMATION MANAGEMENT | Facility: CLINIC | Age: 68
End: 2023-11-28
Payer: COMMERCIAL

## 2023-11-29 ENCOUNTER — OFFICE VISIT (OUTPATIENT)
Dept: VASCULAR SURGERY | Facility: CLINIC | Age: 68
End: 2023-11-29
Attending: NURSE PRACTITIONER
Payer: COMMERCIAL

## 2023-11-29 VITALS
BODY MASS INDEX: 16.67 KG/M2 | RESPIRATION RATE: 12 BRPM | WEIGHT: 104 LBS | SYSTOLIC BLOOD PRESSURE: 114 MMHG | DIASTOLIC BLOOD PRESSURE: 65 MMHG | HEART RATE: 84 BPM | OXYGEN SATURATION: 99 %

## 2023-11-29 DIAGNOSIS — I83.812 VARICOSE VEINS OF LEFT LOWER EXTREMITY WITH PAIN: ICD-10-CM

## 2023-11-29 DIAGNOSIS — I83.893 SYMPTOMATIC VARICOSE VEINS OF BOTH LOWER EXTREMITIES: Primary | ICD-10-CM

## 2023-11-29 PROCEDURE — 99214 OFFICE O/P EST MOD 30 MIN: CPT | Performed by: SPECIALIST

## 2023-11-29 PROCEDURE — 99203 OFFICE O/P NEW LOW 30 MIN: CPT | Performed by: SPECIALIST

## 2023-11-29 ASSESSMENT — PAIN SCALES - GENERAL: PAINLEVEL: MODERATE PAIN (4)

## 2023-11-29 NOTE — PROGRESS NOTES
[facility]        Patient is here for a consult to discuss varicose vein(s). The patient has varicose veins that are problematic in left legs. Symptoms patient has been experiencing are  feeling of tightness, heaviness, and  itching. Patient states their varicose veins are bothersome when standing and household chores. Patient has been using pain medication or anti-inflammatory's. Patient has not had recent imaging on legs done. Patient has not done conservative measures with exercise but does not wear compression stockings. Educated patient to work on conservative treatments.      Pt is currently taking no meds that would impact our treatment plan.    /65   Pulse 84   Resp 12   Wt 104 lb (47.2 kg)   LMP  (LMP Unknown)   SpO2 99%   BMI 16.67 kg/m        The provider has been notified that the patient has no concerns.     Questions patient would like addressed today are: N/A.    Refills are needed: N/A    Has homecare services and agency name:  No

## 2023-11-30 NOTE — PROGRESS NOTES
[facility] Vein Consult      Assessment:     1. varicose veins, bilateral   2. spider veins, bilateral     Plan:     1. Treatment options of conservative therapy of stockings use, exercise, weight loss, elevating legs when possible.    2. Script for compression stockings 20-30 mm hg  3. Ultrasound to evaluate legs for incompetency of both deep and superficial system .   4. Surgical treatment, discussed briefly today  5. Follow up: 3 months.   6. Call for any questions concerns or issues    Subjective:      Deya Mantilla is a 68 year old female  who was referred by Kavita Lombardo  for evaluation of varicose veins. Symptoms include pain, aching, fatigue, burning, edema, and dermatitis. Patient has history of leg selling, pain and vein issues that have progressed. Pain and symptoms have affected daily living and work activities needing medications. Here for evaluation today. no stocking or compression devic use    Allergies:Percocet [oxycodone-acetaminophen]    Past Medical History:   Diagnosis Date    Allergic rhinitis, cause unspecified     Anorexia nervosa (H28)     Depressive disorder, not elsewhere classified     Disorder of bone and cartilage, unspecified     Migraine, unspecified, without mention of intractable migraine without mention of status migrainosus     with auras    Osteopenia     Personal history of colonic polyps 08/2007    Pure hypercholesterolemia     diet/exercise       Past Surgical History:   Procedure Laterality Date    C DEXA, BONE DENSITY, AXIAL SKEL  10/2005    COLONOSCOPY  08/2007    repeat 5 yr    COLONOSCOPY  08/21/2012    Procedure: COLONOSCOPY;  COLONOSCOPY;  Surgeon: Sang Gurrola MD;  Location:  GI    OPEN REDUCTION INTERNAL FIXATION WRIST Right 3/2/2023    Procedure: OPEN REDUCTION INTERNAL FIXATION, FRACTURE, Distal Radius WRIST;  Surgeon: Dotty Mehta MD;  Location:  OR    SURGICAL HISTORY OF -   1989    Baker cyst removal L postauricular area     SURGICAL HISTORY OF -   05/2006    Distal Radius Fracture Repair         Current Outpatient Medications:     buPROPion (WELLBUTRIN SR) 100 MG 12 hr tablet, Take 1 tablet (100 mg) by mouth 2 times daily, Disp: 180 tablet, Rfl: 3    escitalopram (LEXAPRO) 10 MG tablet, Take 1 tablet (10 mg) by mouth every morning, Disp: 90 tablet, Rfl: 3    MULTIVITAMIN TABS   OR, Take by mouth daily (with lunch) With extra calcium and Vitamin D, Disp: , Rfl:      Family History   Problem Relation Age of Onset    Hypertension Mother     Lipids Mother     Alzheimer Disease Mother     Hyperlipidemia Mother     Thyroid Disease Mother         hypothyroid    Prostate Cancer Father     Depression Father     Other Cancer Father     Cerebrovascular Disease Father     Osteoporosis Paternal Grandmother     Thyroid Disease Cousin     Mental Illness Other     Anesthesia Reaction No family hx of     Venous thrombosis No family hx of         reports that she has never smoked. She has never used smokeless tobacco. She reports that she does not drink alcohol and does not use drugs.      Review of Systems:    Pertinent items are noted in HPI.  Patient has symptomatic veins and changes of bilateral legs. These have progressed to the point of causing symptoms on a daily basis. This causes issues with daily activities and chores such as washing dishes, vacuuming, outdoor upkeep, and standing for long lengths of time       Objective:     Vitals:    11/29/23 1124   BP: 114/65   Pulse: 84   Resp: 12   SpO2: 99%   Weight: 47.2 kg (104 lb)     Body mass index is 16.67 kg/m .    EXAM:  GENERAL: This is a well-developed 68 year old female who appears her stated age  HEAD: normocephalic  HEENT: Pupils equal and reactive bilaterally  MOUTH: mucus membranes intact. Normal dentation  CARDIAC: RRR without murmur  CHEST/LUNG:  Clear to auscultation bilaterally  ABDOMEN: Soft, nontender, nondistended, no masses noted   NEUROLOGIC: Focally intact, nonfocal, alert and  oriented x 3  INTEGUMENT: No open lesions or ulcers  VASCULAR: Pulses intact, symmetrical upper and lower extremities. There areskin changes consistent with chronic venous insufficiency. Varicose veins present in bilateral greater saphenous distribution. Spider veins present bilateral.                Side:: Bilateral  VCSS  PAIN:: Moderate: Daily moderate activity limitation, occasional pain medication  Varicose Veins:: Mild: Few scattered  Venous Edema:: Absent: None  Skin Pigmentation:: Absent: None  Inflamation:: Absent: None  Induration:: Absent: None  Number of active ulcers:: 0  Active ulcer duration:: None  Active ulcer diameter:: None  Compression Therapy:: Not used or patient not compliant  VCSS Score:: 3  CEAP:: Simple varicose veins only    Imaging:    Pending    Kristopher Patrick MD  General Surgery [phone]  Vascular Surgery [phone]

## 2023-12-05 ENCOUNTER — ANCILLARY PROCEDURE (OUTPATIENT)
Dept: BONE DENSITY | Facility: CLINIC | Age: 68
End: 2023-12-05
Attending: NURSE PRACTITIONER
Payer: COMMERCIAL

## 2023-12-05 DIAGNOSIS — Z78.0 ASYMPTOMATIC MENOPAUSAL STATE: ICD-10-CM

## 2023-12-05 PROCEDURE — 77080 DXA BONE DENSITY AXIAL: CPT | Mod: TC | Performed by: PHYSICIAN ASSISTANT

## 2024-01-08 ENCOUNTER — VIRTUAL VISIT (OUTPATIENT)
Dept: FAMILY MEDICINE | Facility: CLINIC | Age: 69
End: 2024-01-08
Payer: COMMERCIAL

## 2024-01-08 DIAGNOSIS — F33.1 MAJOR DEPRESSIVE DISORDER, RECURRENT EPISODE, MODERATE (H): ICD-10-CM

## 2024-01-08 DIAGNOSIS — M85.80 OSTEOPENIA WITH HIGH RISK OF FRACTURE: Primary | ICD-10-CM

## 2024-01-08 PROCEDURE — 99214 OFFICE O/P EST MOD 30 MIN: CPT | Mod: 95 | Performed by: NURSE PRACTITIONER

## 2024-01-08 RX ORDER — ALENDRONATE SODIUM 70 MG/1
70 TABLET ORAL
Qty: 12 TABLET | Refills: 3 | Status: SHIPPED | OUTPATIENT
Start: 2024-01-08

## 2024-01-08 NOTE — PROGRESS NOTES
"Deya is a 68 year old who is being evaluated via a billable video visit.      How would you like to obtain your AVS? MyChart  If the video visit is dropped, the invitation should be resent by: Text to cell phone: [phone]  Will anyone else be joining your video visit? Yes:  sitting next to her. How would they like to receive their invitation? Text to cell phone: [phone]          Assessment & Plan     (M85.80) Osteopenia with high risk of fracture  (primary encounter diagnosis)  Comment:   Plan: Vitamin D Deficiency, alendronate (FOSAMAX) 70         MG tablet        Will check vitamin D level.  Bmp was checked at her physical.  Discussed the use and indication of this medication as well as potential side effects.  Recheck DEXA scan in 2 years.     (F33.1) Major depressive disorder, recurrent episode, moderate (H)  Comment: stable  Plan: The current medical regimen is effective;  continue present plan and medications.       I spent a total of 32 minutes on the day of the visit.   Time spent by me doing chart review, history and exam, documentation and further activities per the note           Kavita Lombardo NP  [facility]    Antoine Falk is a 68 year old, presenting for the following health issues:  Results        1/8/2024     4:47 PM   Additional Questions   Roomed by Marilia Roth   Accompanied by Self       History of Present Illness       Reason for visit:  Bone scan reports    She eats 2-3 servings of fruits and vegetables daily.She consumes 1 sweetened beverage(s) daily.She exercises with enough effort to increase her heart rate 20 to 29 minutes per day.  She exercises with enough effort to increase her heart rate 6 days per week.   She is taking medications regularly.               Review of Systems         Objective    Vitals - Patient Reported  Weight (Patient Reported): 47.2 kg (104 lb)  Height (Patient Reported): 167.6 cm (5' 6\")  BMI (Based on Pt Reported " Ht/Wt): 16.79  Pain Score: No Pain (0)        Physical Exam   GENERAL: Healthy, alert and no distress  EYES: Eyes grossly normal to inspection.  No discharge or erythema, or obvious scleral/conjunctival abnormalities.  RESP: No audible wheeze, cough, or visible cyanosis.  No visible retractions or increased work of breathing.    SKIN: Visible skin clear. No significant rash, abnormal pigmentation or lesions.  NEURO: Cranial nerves grossly intact.  Mentation and speech appropriate for age.  PSYCH: Mentation appears normal, affect normal/bright, judgement and insight intact, normal speech and appearance well-groomed.                Video-Visit Details    Type of service:  Video Visit   Video Start Time:  5:30  Video End Time:6:05 PM    Originating Location (pt. Location): Home    Distant Location (provider location):  On-site  Platform used for Video Visit: Tommy

## 2024-01-12 ENCOUNTER — LAB (OUTPATIENT)
Dept: LAB | Facility: CLINIC | Age: 69
End: 2024-01-12
Payer: COMMERCIAL

## 2024-01-12 DIAGNOSIS — M85.80 OSTEOPENIA WITH HIGH RISK OF FRACTURE: ICD-10-CM

## 2024-01-12 PROCEDURE — 82306 VITAMIN D 25 HYDROXY: CPT

## 2024-01-12 PROCEDURE — 36415 COLL VENOUS BLD VENIPUNCTURE: CPT

## 2024-01-13 LAB — VIT D+METAB SERPL-MCNC: 33 NG/ML (ref 20–50)

## 2024-02-28 ENCOUNTER — OFFICE VISIT (OUTPATIENT)
Dept: VASCULAR SURGERY | Facility: CLINIC | Age: 69
End: 2024-02-28
Attending: SPECIALIST
Payer: COMMERCIAL

## 2024-02-28 ENCOUNTER — ANCILLARY PROCEDURE (OUTPATIENT)
Dept: VASCULAR ULTRASOUND | Facility: CLINIC | Age: 69
End: 2024-02-28
Attending: SPECIALIST
Payer: COMMERCIAL

## 2024-02-28 VITALS
SYSTOLIC BLOOD PRESSURE: 110 MMHG | TEMPERATURE: 97.8 F | DIASTOLIC BLOOD PRESSURE: 60 MMHG | RESPIRATION RATE: 12 BRPM | HEART RATE: 72 BPM

## 2024-02-28 DIAGNOSIS — I83.893 SYMPTOMATIC VARICOSE VEINS OF BOTH LOWER EXTREMITIES: ICD-10-CM

## 2024-02-28 DIAGNOSIS — I83.893 SYMPTOMATIC VARICOSE VEINS OF BOTH LOWER EXTREMITIES: Primary | ICD-10-CM

## 2024-02-28 DIAGNOSIS — I83.812 VARICOSE VEINS OF LEFT LOWER EXTREMITY WITH PAIN: ICD-10-CM

## 2024-02-28 PROCEDURE — 99213 OFFICE O/P EST LOW 20 MIN: CPT | Performed by: SPECIALIST

## 2024-02-28 PROCEDURE — 93970 EXTREMITY STUDY: CPT | Mod: 26 | Performed by: SURGERY

## 2024-02-28 PROCEDURE — 93970 EXTREMITY STUDY: CPT

## 2024-02-28 PROCEDURE — 99214 OFFICE O/P EST MOD 30 MIN: CPT | Mod: 25 | Performed by: SPECIALIST

## 2024-02-28 ASSESSMENT — PAIN SCALES - GENERAL: PAINLEVEL: NO PAIN (0)

## 2024-02-28 NOTE — PATIENT INSTRUCTIONS
"      Surgery for Varicose Veins  If you have large varicose veins, surgery may be the best choice. But it will not prevent new varicose veins from forming. Surgery is most often done in a hospital or surgery center on an outpatient basis.  Varicose vein surgery    What is microphlebectomy?  Phlebectomy (say \"Elliott\") is a procedure used to remove varicose veins. These are twisted and enlarged veins near the surface of the skin. The procedure is also called microphlebectomy, ambulatory phlebectomy or stab avulsion.    How is the procedure done?  The procedure is usually done in your doctor's office. You will get medicine to numb the area.  Your doctor will make several tiny cuts (incisions) in the skin. The varicose veins will be removed through the cuts.  You most likely will not need stitches to close the cuts.    What can you expect after the procedure?  Your doctor may wrap your leg in a bandage. You may also wear compression stockings. Your doctor will tell you how long to wear them.  You can go home the same day. You will probably be able to do your usual activities the next day. You may have a little bruising and numbness.  Follow-up care is a key part of your treatment and safety. Be sure to make and go to all appointments, and call your doctor if you are having problems. It's also a good idea to know your test results and keep a list of the medicines you take.    Current as of: December 19, 2022  Author: [facility] Staff  Medical Review:Tesha Reis MD - Family Medicine & Larry Angel MD - Family Medicine & Haile Chen MD - Vascular Surgery    Deya,  Your visit to [facility] for your surgery or procedure is coming soon and we look forward to seeing you! This friendly reminder and pre-procedure checklist will help to ensure your procedure/surgery goes smoothly and meets your expectations. At [facility], our goal is to provide you with a great patient " experience and to deliver genuine, professional care to every patient.   Please complete all the steps in advance of your visit. If you have any questions about the items listed below, please give our office a call. We can be reached at [phone] or visit our website at [email or web address]  for more information.     Procedure: Phlebectomy of left  Surgeon: Dr. Kristopher Patrick  Procedure Date :  TBA  Procedure Location: [facility]:  [address] [place] [phone] (Fax: [phone])  Procedure Time :  TBA  Arrival Time: TBA  Admission Type: Outpatient  Post Operative Appointment with Dr Kristopher Patrick:    IF YOU NEED TO RESCHEDULE OR CANCEL YOUR PROCEDURE FOR ANY REASON PLEASE CALL THE CLINIC AS SOON AS POSSIBLE -[phone].    Complete the following checklist before your procedure/surgery    [] Contact your insurance regarding coverage. We will do a prior authorization, but please be aware this doesn't mean you are covered at 100%. If your insurance has changed please notify us.  Please check with your insurance for coverage and your out of pocket.  Stab phlebectomy outpatient procedure code: 13060  Stab phlebectomy in clinic procedure code: 37655  Vein ligation procedure code: 39800  If you would like a Good [facility] Estimate for your upcoming service/procedure contact Cost of Care Estimates at [phone], advocates are available Monday - Friday 8am - 5pm.  You may also submit a request online through your [employer] account.  If your procedure is at [facility] please contact the numbers below for Cost of Care Estimates.   - Facility Charge: [phone]    Anesthesiology charge:  [phone]    For all self-pay, estimate, or anesthesia billing questions at [facility], the contact information is below:  Who to contact: Pallavi BECERRA  [facility] Anesthesia Network number: [phone]  Prepay number: [phone]    []  Pre-Operative Physical   You will need a Pre-Op physical within 30 days of surgery date from your primary provider.     [] Pre-Operative Medication Instructions  Contact your prescribing provider for instructions before discontinuing any medications including anticoagulants. (Examples: Coumadin, Heparin, Xarelto, Eliquis, Plavix, Pradaxa, Effiient, Briliant) We would like you stop them 3-5 days before surgery, depending on the medicaion. HOWEVER, please follow the advice of your primary care provider. Most surgeons will have you remain on your aspirin.      Not Applicable    If you take these diabetic medications, please discuss with your primary doctor and follow the hold instructions:   Hold seven (7) days prior for once weekly injectable doses [semaglutide (Ozempic, Wegovy), dulaglutide (Trulicity), exenatide ER (Bydureon), tirzepatide (Mounjaro)]  Hold the day before and day of for once daily injectable GLP-1 agonists [exenatide (Byetta), liraglutide (Saxenda, Victoza)]  Hold seven (7) days for oral semaglutide (Rybelsus)     Tell your healthcare provider about all medicines you take and any allergies you may have.      [] Fasting Requirements  Nothing to eat for 8 hours before surgery unless instructed differently by the surgery nurse.   You may have clear liquids up to two hours before your arrival time (coffee, tea, water, or Gatorade. No cream or milk)  If your primary care provider has instructed you to continue oral medications, you may take them on the morning of surgery with a small sip of water.    No alcohol or smoking after 12:00am the day of surgery.    [] You will receive a call from a surgery nurse 3-5 days prior to surgery.     []DO NOT BRING FMLA WITH TO SURGERY.  These should be sent to the provider's office by fax to [phone]   Use Chlorhexidine Gluconate 4% soap to help prevent surgical site infections    You play an important part in helping to prevent a surgical site infection. Let s  review what you will need to do.    Chlorhexidine Gluconate 4% is a powerful antiseptic that will help make sure your skin is free of germs. It looks and feels just like liquid soap and should be used liked a shower gel.    **[facility] patients can receive free Chlorhexidine Gluconate 4% soap for surgery at any outpatient [facility] pharmacy. You can also buy this over the counter at any pharmacy.**    Do not shave within 12 inches of your incision (surgical cut) area for at least 3 days before surgery. Shaving can make small cuts in the skin. This puts you at a higher risk of infection.    Items you will need for each shower:   1 newly washed towel   4 ounces of one of the above soaps   Clean pajamas or clothes to change into    Follow these steps the evening before surgery and the morning of surgery.  Remove all body piercings and jewelry, and leave them off until after your surgery.  Wash your hair and body with your regular shampoo and soap. Make sure you rinse the shampoo and soap from your hair and body.  Using clean hands, apply about 2 ounces of soap gently on your skin from your ear lobes to your toes. You don t need to scrub your skin, but make sure you liberally wash the area where your surgery will be done. Use on your groin area last. Do not use this soap on your face or head. If you get any soap in your eyes, ears or mouth, rinse right away. It is very important to let the soap stay on your skin for at least 1 minute.  Repeat step 2.   Rinse well and dry off using a clean towel. If you feel any tingling, itching or other irritation, rinse right away. It is normal to feel some coolness on the skin after using the antiseptic soap. Your skin may feel a bit dry after the shower, but do not use any lotions, creams or moisturizers. Do not use hair spray or other products in your hair. Also, do not wash with your regular soap after using this.  Dress in freshly washed clothes or pajamas. Use fresh pillowcases  and sheets on your bed.  Repeat these steps the morning of surgery.    If you are allergic or sensitive to Chlorhexidine Gluconate, use an antibacterial soap instead, following the same steps.    If you cannot use the shower, wash yourself with this soap at the sink. Make sure the sink is clean before you begin, and do the best you can to clean your entire body.       Discharge Instructions for Varicose Vein Surgery    You had a procedure in which your varicose veins were surgically removed. Here s what you can do following surgery to help with your recovery.  Home care Recommendations for taking care of yourself at home include the following:    Ask someone to help you run errands or do household chores for a few days after surgery.    Keep your legs raised when you re sitting or lying down.    Begin a regular walking program, starting the day after surgery. Just walk for a few minutes at first; then work up to 5 minutes at a time. Gradually increase to 15 to 20 minutes at a time, 2 to 3 times a day.      Post Op Instructions:    You will be discharged with compression wraps from your toe to thigh and will wear the wraps for 5-7 days. You can remove the wraps to shower after 48 hours. Please ensure you continue wrapping your leg with the compression wrap for at least the next 5 days following surgery. You can transition if it is comfortable enough into thigh high compression stockings  for 1 month. You should then continue to wear your compression as much as possible.     No flying for 3 weeks post vein surgery.    For the first 2 weeks after surgery, avoid sitting and standing for long periods. Also, avoid lifting greater 20 lbs.     Take pain medication as prescribed. You are unable to drive until off of narcotics. Elevate the affected extremity as much as possible.    It is normal to have fluid drainage, some bruising, numbness and discoloration post operation, these are temporary.     Walking will help you  recover more quickly.    It is ok to massage areas where the veins were taken out after dressings are removed.    You will receive pain medication after your surgery, take as needed. Take pain medication as prescribed. You are unable to drive until off of narcotics.    Ice use for the first 5-7 days is encouraged on areas of discomfort. 30 minutes on and 30 minutes off.     If you can take anti-inflammatories like motrin, advil, or ibuprofen with every meal or evening. Dose 400-600 mg at each dose. It is good to have food or something in your stomach.    For questions after business hours please call Dr. Kristopher Patrick at [phone] or for Dr. Thien Razo please call [phone].    For questions during business hours please call [phone]  8:00 am - 4:30 pm    Please do not hesitate to call us for questions or concerns.    After your stab phlebectomy we would like to see you two weeks after for follow up. If you don't already have a follow up appointment you should be seen at [facility] in 2 weeks.       Call your healthcare provider right away if you have any of the following:  Severe bleeding, redness, or drainage at the incision sites  Development of an ulcer (sore) at the incision sites  Numbness or tingling in legs or feet  Increasing leg pain or swelling  Fever, shaking, or chills  Chest pain or shortness of breath

## 2024-02-28 NOTE — PROGRESS NOTES
[facility]        Patient is here for a 3 week  follow up  to discuss Varicose vein(s). The patient has varicose veins that are problematic in left>R legs. Symptoms patient has been experiencing are heaviness, aching, and  itching. Patient states their varicose veins are bothersome when standing, sitting,  working, and household chores. Patient has not been using pain medication or anti-inflammatory's. Patient has had recent imaging on legs done. Patient has done conservative measures which include: compression stockings, elevation, and exercise. Treatment has been successful due to less pain and itching. Educated patient to work on conservative treatments.   US done and reviewed options. Left stab phlebectomy option. Will preauth through [employer].Patient is a .VV very smal and may not get approved.    Pt is currently taking no meds that would impact our treatment plan.    /60   Pulse 72   Temp 97.8  F (36.6  C)   Resp 12   LMP  (LMP Unknown)             Refills are needed: No    Has homecare services and agency name:  No

## 2024-03-05 ENCOUNTER — TELEPHONE (OUTPATIENT)
Dept: VASCULAR SURGERY | Facility: CLINIC | Age: 69
End: 2024-03-05
Payer: COMMERCIAL

## 2024-03-05 NOTE — TELEPHONE ENCOUNTER
No PA required from [facility] or [facility] for stab phlebectomy; OK to schedule.

## 2024-03-06 NOTE — PROGRESS NOTES
[facility] Surgery Follow up    HPI:    68 year old year old female who returns for a follow up. Back to discuss US results initial consult a few weeks ago.     Allergies:Percocet [oxycodone-acetaminophen]    Past Medical History:   Diagnosis Date    Allergic rhinitis, cause unspecified     Anorexia nervosa (H28)     Depressive disorder, not elsewhere classified     Disorder of bone and cartilage, unspecified     Migraine, unspecified, without mention of intractable migraine without mention of status migrainosus     with auras    Osteopenia     Personal history of colonic polyps 08/2007    Pure hypercholesterolemia     diet/exercise       Past Surgical History:   Procedure Laterality Date    C DEXA, BONE DENSITY, AXIAL SKEL  10/2005    COLONOSCOPY  08/2007    repeat 5 yr    COLONOSCOPY  08/21/2012    Procedure: COLONOSCOPY;  COLONOSCOPY;  Surgeon: Sang Gurrola MD;  Location:  GI    OPEN REDUCTION INTERNAL FIXATION WRIST Right 3/2/2023    Procedure: OPEN REDUCTION INTERNAL FIXATION, FRACTURE, Distal Radius WRIST;  Surgeon: Dotty Mehta MD;  Location: [facility] OR    SURGICAL HISTORY OF -   1989    Baker cyst removal L postauricular area    SURGICAL HISTORY OF -   05/2006    Distal Radius Fracture Repair       CURRENT MEDS:  Current Outpatient Medications   Medication    alendronate (FOSAMAX) 70 MG tablet    buPROPion (WELLBUTRIN SR) 100 MG 12 hr tablet    escitalopram (LEXAPRO) 10 MG tablet    MULTIVITAMIN TABS   OR     No current facility-administered medications for this visit.       Family History   Problem Relation Age of Onset    Hypertension Mother     Lipids Mother     Alzheimer Disease Mother     Hyperlipidemia Mother     Thyroid Disease Mother         hypothyroid    Prostate Cancer Father     Depression Father     Other Cancer Father     Cerebrovascular Disease Father     Osteoporosis Paternal Grandmother     Thyroid Disease Cousin     Mental Illness Other     Anesthesia Reaction No family hx of      Venous thrombosis No family hx of         reports that she has never smoked. She has never used smokeless tobacco. She reports that she does not drink alcohol and does not use drugs.    Review of Systems:  Negative except leg issues. 12 hours  r    OBJECTIVE:  Vitals:    02/28/24 0930   BP: 110/60   Pulse: 72   Resp: 12   Temp: 97.8  F (36.6  C)     There is no height or weight on file to calculate BMI.    Status: doing well    EXAM:  GENERAL: This is a well-developed 68 year old female who appears her stated age  HEAD: normocephalic  HEENT: Pupils equal and reactive bilaterally  NEUROLOGIC: Focally intact, nonfocal  VASCULAR: Pulses intact, symmetrical upper and lower extremities.  Some varicose veins and spiders bilaterally              Side:: Bilateral  VCSS  PAIN:: Moderate: Daily moderate activity limitation, occasional pain medication  Varicose Veins:: Mild: Few scattered  Venous Edema:: Absent: None  Skin Pigmentation:: Absent: None  Inflamation:: Absent: None  Induration:: Absent: None  Number of active ulcers:: 0  Active ulcer duration:: None  Active ulcer diameter:: None  Compression Therapy:: Full compliance stockings + elevation  VCSS Score:: 6  CEAP:: Simple varicose veins only    LABS:  Lab Results   Component Value Date    WBC 5.0 10/20/2020    HGB 13.0 10/20/2020    HCT 41.9 10/20/2020    MCV 92 10/20/2020     10/20/2020     Lab Results   Component Value Date    ALT 18 01/13/2015    AST 12 01/13/2015    ALKPHOS 53 01/13/2015        Images:     Reviewed US study with patient showing no major insuffiencies bilateral legs.    Exam Information    Exam Date Exam Time Accession # Performing Department Results    2/28/24  9:53 AM [id number] [facility] [place]      PACS Images     Show images for US Venous Competency Bilateral     Study Result    Narrative & Impression   BILATERAL Venous Insufficiency Ultrasound (Date: 02/28/24)    BILATERAL Lower  Extremity          Indication:  Surveillance Bilateral Legs Symptomatic Varicose Veins, Pain, and Swelling. Pain in veins over Left distal shin.       Previous: None     Patient History: Swelling     Presenting Symptoms:  Swelling, Varicose Veins, and Pain     Technique:   Supine and Upright Ultrasound of the Deep and Superficial Veins with Valsalva and Compression Augmentation Maneuvers. Duplex Imaging is performed utilizing gray-scale, Two-dimensional images, color-flow imaging, Doppler waveform analysis, and Spectral doppler imaging done with provacative maneuvers.      Incompetency Criteria:  Deep vein reflux reported when greater than 1000 msec flow reversal. Superficial vein reflux reported when equal to or greater than 600 msec flow reversal.  vein reflux reported as greater than 350 msec flow reversal.      Right  Leg Deep Veins    CFV SFJ DFV PROX FV   PROX FV MID FV DIST POP V. PERON.   V. PTV'S   Compressibility  (FC,PC,NC) FC FC FC FC FC FC FC FC FC   Phasic +   + + + + +   +   Reflux -   - - - - -   -         Right Leg Superficial Veins  Location SFJ PROX THIGH KNEE MID CALF   GSV (mm) 4.1 2.9 2.5 1.0   Reflux - - - -      Location SPJ PROX CALF MID CALF   SSV (mm) 1.1 1.8 1.4   Reflux - - -      Left  Leg Deep Veins    CFV SFJ DFV PROX FV   PROX FV MID FV DIST POP V. PERON.   V. PTV'S   Compressibility  (FC,PC,NC) FC FC FC FC FC FC FC FC FC   Phasic +   + + + + +   +   Reflux -   - - - - -   -         Left Leg Superficial Veins  Location SFJ PROX THIGH KNEE MID CALF   GSV (mm) 4.0 1.9 1.7 1.7   Reflux - - - -   AASV (mm) 1.7         Reflux -             Location SPJ PROX CALF MID CALF   SSV (mm) 1.8 1.1 1.1   Reflux - - -      Comments: No incompetent  veins visualized.      Impression:       Right Deep Vein Findings: Patent deep venous system with no evidence of DVT and without deep venous reflux     Left Deep Vein Findings: Patent deep venous system with no evidence of DVT and  without deep venous reflux     Superficial Vein Findings:      Right Greater Saphenous Vein: Patent Greater Saphenous Vein without evidence of reflux.     Right Small Saphenous Vein: Patent Small Saphenous Vein without evidence of reflux.     Left Greater Saphenous Vein: Patent Greater Saphenous Vein without evidence of reflux.     Left Anterior Accessory Saphenous Vein: Patent Anterior Accessory Saphenous Vein without evidence of reflux.     Left Small Saphenous Vein: Patent Small Saphenous Vein without evidence of reflux.     Reference:     Venous Phasicity: (+) = Present  (0) = Absent  (-) = Decreased/Unable to Evaluate, (NV) = Not Visualized     Compressibility: FC= Fully compressible, PC= Partially compressible, NC= Non-compressible, NV= Not Visualized     Reflux: (+) Incompetent  (-) Competent, (NV) = Not Visualized     Interpretation criteria:          Duration of Retrograde flow (milliseconds)  Category Deep Veins Superficial Veins  veins   Competent < 1000ms < 500ms < 350ms   Incompetent > 1000ms > 500ms > 350ms              Assessment/Plan:      Symptomatic varicose veins of both lower extremities  Varicose veins of left lower extremity with pain     Went to patient's ultrasound with her today which shows no major insufficiencies noted.  With that needle closure is really not an option and compression is which she continues to still do.  We discussed our phlebectomy as an option also at this time point she can continue her socks and compression and see how it goes may consider that in the future.  Will be available when she makes a provide about her decision.  Discussed and answer questions with her today.    No follow-ups on file.     Kristopher Patrick MD ,MD  [facility] Department of Surgery

## 2024-03-13 DIAGNOSIS — I83.892 SYMPTOMATIC VARICOSE VEINS, LEFT: Primary | ICD-10-CM

## 2024-03-13 RX ORDER — ACETAMINOPHEN 325 MG/1
975 TABLET ORAL ONCE
OUTPATIENT
Start: 2024-03-13 | End: 2024-03-13

## 2024-07-24 ENCOUNTER — ALLIED HEALTH/NURSE VISIT (OUTPATIENT)
Dept: FAMILY MEDICINE | Facility: CLINIC | Age: 69
End: 2024-07-24
Payer: COMMERCIAL

## 2024-07-24 DIAGNOSIS — Z29.11 NEED FOR VACCINATION AGAINST RESPIRATORY SYNCYTIAL VIRUS: ICD-10-CM

## 2024-07-24 DIAGNOSIS — Z23 ENCOUNTER FOR IMMUNIZATION: Primary | ICD-10-CM

## 2024-07-24 PROCEDURE — 91320 SARSCV2 VAC 30MCG TRS-SUC IM: CPT

## 2024-07-24 PROCEDURE — 90480 ADMN SARSCOV2 VAC 1/ONLY CMP: CPT

## 2024-07-24 PROCEDURE — 99207 PR NO CHARGE NURSE ONLY: CPT

## 2024-07-24 PROCEDURE — 90677 PCV20 VACCINE IM: CPT

## 2024-07-24 PROCEDURE — 90471 IMMUNIZATION ADMIN: CPT

## 2024-07-24 ASSESSMENT — PATIENT HEALTH QUESTIONNAIRE - PHQ9: SUM OF ALL RESPONSES TO PHQ QUESTIONS 1-9: 1

## 2024-07-24 NOTE — PROGRESS NOTES
Prior to immunization administration, verified patients identity using patient s name and date of birth. Please see Immunization Activity for additional information.     Screening Questionnaire for Adult Immunization    Are you sick today?   No   Do you have allergies to medications, food, a vaccine component or latex?   No   Have you ever had a serious reaction after receiving a vaccination?   No   Do you have a long-term health problem with heart, lung, kidney, or metabolic disease (e.g., diabetes), asthma, a blood disorder, no spleen, complement component deficiency, a cochlear implant, or a spinal fluid leak?  Are you on long-term aspirin therapy?   No   Do you have cancer, leukemia, HIV/AIDS, or any other immune system problem?   No   Do you have a parent, brother, or sister with an immune system problem?   No   In the past 3 months, have you taken medications that affect  your immune system, such as prednisone, other steroids, or anticancer drugs; drugs for the treatment of rheumatoid arthritis, Crohn s disease, or psoriasis; or have you had radiation treatments?   No   Have you had a seizure, or a brain or other nervous system problem?   No   During the past year, have you received a transfusion of blood or blood    products, or been given immune (gamma) globulin or antiviral drug?   No   For women: Are you pregnant or is there a chance you could become       pregnant during the next month?   No   Have you received any vaccinations in the past 4 weeks?   No     Immunization questionnaire answers were all negative.    I have reviewed the following standing orders: This patient is due and qualifies for the Pneumococcal vaccine.    Click here for Pneumococcal (Adult) Standing Order    I have reviewed the vaccines inclusion and exclusion criteria;No concerns regarding eligibility.     Patient instructed to remain in clinic for 15 minutes afterwards, and to report any adverse reactions.     Screening performed by  Jazzmine Fish MA on 7/24/2024 at 3:00 PM.

## 2024-11-26 ENCOUNTER — OFFICE VISIT (OUTPATIENT)
Dept: FAMILY MEDICINE | Facility: CLINIC | Age: 69
End: 2024-11-26
Attending: NURSE PRACTITIONER
Payer: COMMERCIAL

## 2024-11-26 VITALS
RESPIRATION RATE: 18 BRPM | HEART RATE: 84 BPM | HEIGHT: 66 IN | DIASTOLIC BLOOD PRESSURE: 64 MMHG | OXYGEN SATURATION: 99 % | SYSTOLIC BLOOD PRESSURE: 113 MMHG | TEMPERATURE: 97.3 F | WEIGHT: 103 LBS | BODY MASS INDEX: 16.55 KG/M2

## 2024-11-26 DIAGNOSIS — F33.1 MAJOR DEPRESSIVE DISORDER, RECURRENT EPISODE, MODERATE (H): ICD-10-CM

## 2024-11-26 DIAGNOSIS — Z00.00 ENCOUNTER FOR MEDICARE ANNUAL WELLNESS EXAM: Primary | ICD-10-CM

## 2024-11-26 DIAGNOSIS — F41.9 ANXIETY: ICD-10-CM

## 2024-11-26 DIAGNOSIS — M85.80 OSTEOPENIA WITH HIGH RISK OF FRACTURE: ICD-10-CM

## 2024-11-26 DIAGNOSIS — E78.5 HYPERLIPIDEMIA LDL GOAL <160: ICD-10-CM

## 2024-11-26 LAB
ANION GAP SERPL CALCULATED.3IONS-SCNC: 10 MMOL/L (ref 7–15)
BUN SERPL-MCNC: 16.8 MG/DL (ref 8–23)
CALCIUM SERPL-MCNC: 9.1 MG/DL (ref 8.8–10.4)
CHLORIDE SERPL-SCNC: 100 MMOL/L (ref 98–107)
CHOLEST SERPL-MCNC: 241 MG/DL
CREAT SERPL-MCNC: 0.76 MG/DL (ref 0.51–0.95)
EGFRCR SERPLBLD CKD-EPI 2021: 85 ML/MIN/1.73M2
FASTING STATUS PATIENT QL REPORTED: YES
FASTING STATUS PATIENT QL REPORTED: YES
GLUCOSE SERPL-MCNC: 90 MG/DL (ref 70–99)
HCO3 SERPL-SCNC: 26 MMOL/L (ref 22–29)
HDLC SERPL-MCNC: 88 MG/DL
LDLC SERPL CALC-MCNC: 144 MG/DL
NONHDLC SERPL-MCNC: 153 MG/DL
POTASSIUM SERPL-SCNC: 4.2 MMOL/L (ref 3.4–5.3)
SODIUM SERPL-SCNC: 136 MMOL/L (ref 135–145)
TRIGL SERPL-MCNC: 47 MG/DL

## 2024-11-26 PROCEDURE — 90662 IIV NO PRSV INCREASED AG IM: CPT | Performed by: NURSE PRACTITIONER

## 2024-11-26 PROCEDURE — 91320 SARSCV2 VAC 30MCG TRS-SUC IM: CPT | Performed by: NURSE PRACTITIONER

## 2024-11-26 PROCEDURE — 99214 OFFICE O/P EST MOD 30 MIN: CPT | Mod: 25 | Performed by: NURSE PRACTITIONER

## 2024-11-26 PROCEDURE — 99397 PER PM REEVAL EST PAT 65+ YR: CPT | Mod: 25 | Performed by: NURSE PRACTITIONER

## 2024-11-26 PROCEDURE — 80061 LIPID PANEL: CPT | Performed by: NURSE PRACTITIONER

## 2024-11-26 PROCEDURE — 90471 IMMUNIZATION ADMIN: CPT | Performed by: NURSE PRACTITIONER

## 2024-11-26 PROCEDURE — 90480 ADMN SARSCOV2 VAC 1/ONLY CMP: CPT | Performed by: NURSE PRACTITIONER

## 2024-11-26 PROCEDURE — 36415 COLL VENOUS BLD VENIPUNCTURE: CPT | Performed by: NURSE PRACTITIONER

## 2024-11-26 PROCEDURE — 80048 BASIC METABOLIC PNL TOTAL CA: CPT | Performed by: NURSE PRACTITIONER

## 2024-11-26 PROCEDURE — 96127 BRIEF EMOTIONAL/BEHAV ASSMT: CPT | Performed by: NURSE PRACTITIONER

## 2024-11-26 RX ORDER — ALENDRONATE SODIUM 70 MG/1
70 TABLET ORAL
Qty: 12 TABLET | Refills: 4 | Status: SHIPPED | OUTPATIENT
Start: 2024-11-26

## 2024-11-26 RX ORDER — ESCITALOPRAM OXALATE 10 MG/1
10 TABLET ORAL EVERY MORNING
Qty: 90 TABLET | Refills: 4 | Status: SHIPPED | OUTPATIENT
Start: 2024-11-26

## 2024-11-26 RX ORDER — BUPROPION HYDROCHLORIDE 100 MG/1
100 TABLET, EXTENDED RELEASE ORAL 2 TIMES DAILY
Qty: 180 TABLET | Refills: 4 | Status: SHIPPED | OUTPATIENT
Start: 2024-11-26

## 2024-11-26 SDOH — HEALTH STABILITY: PHYSICAL HEALTH: ON AVERAGE, HOW MANY MINUTES DO YOU ENGAGE IN EXERCISE AT THIS LEVEL?: 50 MIN

## 2024-11-26 SDOH — HEALTH STABILITY: PHYSICAL HEALTH: ON AVERAGE, HOW MANY DAYS PER WEEK DO YOU ENGAGE IN MODERATE TO STRENUOUS EXERCISE (LIKE A BRISK WALK)?: 6 DAYS

## 2024-11-26 ASSESSMENT — PAIN SCALES - GENERAL: PAINLEVEL_OUTOF10: NO PAIN (0)

## 2024-11-26 ASSESSMENT — PATIENT HEALTH QUESTIONNAIRE - PHQ9
SUM OF ALL RESPONSES TO PHQ QUESTIONS 1-9: 2
SUM OF ALL RESPONSES TO PHQ QUESTIONS 1-9: 2
10. IF YOU CHECKED OFF ANY PROBLEMS, HOW DIFFICULT HAVE THESE PROBLEMS MADE IT FOR YOU TO DO YOUR WORK, TAKE CARE OF THINGS AT HOME, OR GET ALONG WITH OTHER PEOPLE: NOT DIFFICULT AT ALL

## 2024-11-26 ASSESSMENT — SOCIAL DETERMINANTS OF HEALTH (SDOH): HOW OFTEN DO YOU GET TOGETHER WITH FRIENDS OR RELATIVES?: TWICE A WEEK

## 2024-11-26 NOTE — PROGRESS NOTES
Preventive Care Visit  [facility]  Kavita Lombardo, NP, Nurse Practitioner - Family  Nov 26, 2024      Assessment & Plan     (Z00.00) Encounter for Medicare annual wellness exam  (primary encounter diagnosis)  Comment:   Plan:     (E78.5) Hyperlipidemia LDL goal <160  Comment:   Plan: Lipid panel reflex to direct LDL Fasting            (F33.1) Major depressive disorder, recurrent episode, moderate (H)  Comment: stable  Plan: buPROPion (WELLBUTRIN SR) 100 MG 12 hr tablet,         escitalopram (LEXAPRO) 10 MG tablet        The current medical regimen is effective;  continue present plan and medications.     (F41.9) Anxiety  Comment: stable  Plan: escitalopram (LEXAPRO) 10 MG tablet        The current medical regimen is effective;  continue present plan and medications.     (M85.80) Osteopenia with high risk of fracture  Comment: stable  Plan: alendronate (FOSAMAX) 70 MG tablet, Basic         metabolic panel  (Ca, Cl, CO2, Creat, Gluc, K,         Na, BUN)        The current medical regimen is effective;  continue present plan and medications.  Will recheck a DEXA scan in a year.             Counseling  Appropriate preventive services were addressed with this patient via screening, questionnaire, or discussion as appropriate for fall prevention, nutrition, physical activity, Tobacco-use cessation, social engagement, weight loss and cognition.  Checklist reviewing preventive services available has been given to the patient.  Reviewed patient's diet, addressing concerns and/or questions.   Patient reported safety concerns were addressed today.        Antoine Falk is a 69 year old, presenting for the following:  Annual Visit        11/26/2024     8:22 AM   Additional Questions   Roomed by Marge MONTGOMERY  She is doing well on Fosamax and denies and side effects.    Her mood is stable and she is doing well on her current dose of medication.  She feels more calm and comfortable in  where she is in her life, content and happy.  She no longer has to go back home after her father passed away and this has lifted a burden and she no longer has the pressure of expectations that have started in her childhood.   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          Health Care Directive  Patient does not have a Health Care Directive: Patient states has Advance Directive and will bring in a copy to clinic.      11/26/2024   General Health   How would you rate your overall physical health? Good   Feel stress (tense, anxious, or unable to sleep) Only a little      (!) STRESS CONCERN      11/26/2024   Nutrition   Diet: Vegetarian/vegan    Breakfast skipped       Multiple values from one day are sorted in reverse-chronological order         11/26/2024   Exercise   Days per week of moderate/strenous exercise 6 days   Average minutes spent exercising at this level 50 min            11/26/2024   Social Factors   Frequency of gathering with friends or relatives Twice a week   Worry food won't last until get money to buy more No   Food not last or not have enough money for food? No   Do you have housing? (Housing is defined as stable permanent housing and does not include staying ouside in a car, in a tent, in an abandoned building, in an overnight shelter, or couch-surfing.) Yes   Are you worried about losing your housing? Yes   Lack of transportation? No   Unable to get utilities (heat,electricity)? No   Want help with housing or utility concern? No      (!) HOUSING CONCERN PRESENT      11/26/2024   Fall Risk   Fallen 2 or more times in the past year? No    Trouble with walking or balance? No        Patient-reported          11/26/2024   Activities of Daily Living- Home Safety   Needs help with the following daily activites None of the above   Safety concerns in the home Throw rugs in the hallway    No grab bars in the bathroom       Multiple values from one day are sorted  in reverse-chronological order         11/26/2024   Dental   Dentist two times every year? Yes            11/26/2024   Hearing Screening   Hearing concerns? None of the above            11/26/2024   Driving Risk Screening   Patient/family members have concerns about driving No            11/26/2024   General Alertness/Fatigue Screening   Have you been more tired than usual lately? No            11/26/2024   Urinary Incontinence Screening   Bothered by leaking urine in past 6 months No            11/26/2024   TB Screening   Were you born outside of the [place]? No          Today's PHQ-9 Score:       11/26/2024     7:59 AM   PHQ-9 SCORE   PHQ-9 Total Score MyChart 2 (Minimal depression)   PHQ-9 Total Score 2        Patient-reported         11/26/2024   Substance Use   Alcohol more than 3/day or more than 7/wk Not Applicable   Do you have a current opioid prescription? No   How severe/bad is pain from 1 to 10? 0/10 (No Pain)   Do you use any other substances recreationally? No        Social History     Tobacco Use    Smoking status: Never    Smokeless tobacco: Never   Substance Use Topics    Alcohol use: No     Comment: Social, very rare    Drug use: No           11/5/2021   LAST FHS-7 RESULTS   1st degree relative breast or ovarian cancer No   Any relative bilateral breast cancer No   Any male have breast cancer No   Any ONE woman have BOTH breast AND ovarian cancer No   Any woman with breast cancer before 50yrs No   2 or more relatives with breast AND/OR ovarian cancer No   2 or more relatives with breast AND/OR bowel cancer No                 History of abnormal Pap smear:         Latest Ref Rng & Units 9/12/2018    10:12 AM 9/12/2018    10:06 AM 1/13/2015    12:00 AM   PAP / HPV   PAP (Historical)   NIL  NIL    HPV 16 DNA NEG^Negative Negative      HPV 18 DNA NEG^Negative Negative      Other HR HPV NEG^Negative Negative        ASCVD Risk   The 10-year ASCVD risk score (Monty DK, et al., 2019) is: 5.7%     "Values used to calculate the score:      Age: 69 years      Sex: Female      Is Non- : No      Diabetic: No      Tobacco smoker: No      Systolic Blood Pressure: 113 mmHg      Is BP treated: No      HDL Cholesterol: 70 mg/dL      Total Cholesterol: 203 mg/dL            Reviewed and updated as needed this visit by Provider                      Current providers sharing in care for this patient include:  Patient Care Team:  Kavita Lombardo NP as PCP - General (Family Practice)  Dotty Mehta MD as Assigned Musculoskeletal Provider  Kavita Lombardo NP as Assigned PCP  Kristopher Patrick MD as Assigned Surgical Provider    The following health maintenance items are reviewed in Epic and correct as of today:  Health Maintenance   Topic Date Due    INFLUENZA VACCINE (1) 09/01/2024    COVID-19 Vaccine (7 - 2024-25 season) 09/18/2024    LIPID  11/20/2024    ANNUAL REVIEW OF HM ORDERS  11/20/2024    MEDICARE ANNUAL WELLNESS VISIT  11/20/2024    PHQ-9  05/26/2025    FALL RISK ASSESSMENT  11/26/2025    MAMMO SCREENING  11/28/2025    COLORECTAL CANCER SCREENING  12/01/2025    GLUCOSE  11/20/2026    ADVANCE CARE PLANNING  11/20/2028    DTAP/TDAP/TD IMMUNIZATION (3 - Td or Tdap) 11/29/2033    DEXA  12/05/2038    HEPATITIS C SCREENING  Completed    DEPRESSION ACTION PLAN  Completed    MIGRAINE ACTION PLAN  Completed    Pneumococcal Vaccine: 65+ Years  Completed    ZOSTER IMMUNIZATION  Completed    RSV VACCINE  Completed    HPV IMMUNIZATION  Aged Out    MENINGITIS IMMUNIZATION  Aged Out    RSV MONOCLONAL ANTIBODY  Aged Out    PAP  Discontinued            Objective    Exam  /64   Pulse 84   Temp 97.3  F (36.3  C) (Temporal)   Resp 18   Ht 1.668 m (5' 5.67\")   Wt 46.7 kg (103 lb)   LMP  (LMP Unknown)   SpO2 99%   BMI 16.79 kg/m     Estimated body mass index is 16.79 kg/m  as calculated from the following:    Height as of this encounter: 1.668 m (5' 5.67\").    Weight as of this " encounter: 46.7 kg (103 lb).    Physical Exam  GENERAL: alert and no distress  EYES: Eyes grossly normal to inspection, PERRL and conjunctivae and sclerae normal  HENT: ear canals and TM's normal, nose and mouth without ulcers or lesions  NECK: no adenopathy, no asymmetry, masses, or scars  RESP: lungs clear to auscultation - no rales, rhonchi or wheezes  CV: regular rate and rhythm, normal S1 S2, no S3 or S4, no murmur, click or rub, no peripheral edema  ABDOMEN: soft, nontender, no hepatosplenomegaly, no masses and bowel sounds normal  MS: no gross musculoskeletal defects noted, no edema  SKIN: no suspicious lesions or rashes  NEURO: Normal strength and tone, mentation intact and speech normal  PSYCH: mentation appears normal, affect normal/bright        11/26/2024   Mini Cog   Clock Draw Score 2 Normal   3 Item Recall 3 objects recalled   Mini Cog Total Score 5                 Signed Electronically by: Kavita Lombardo NP    Answers submitted by the patient for this visit:  Patient Health Questionnaire (Submitted on 11/26/2024)  If you checked off any problems, how difficult have these problems made it for you to do your work, take care of things at home, or get along with other people?: Not difficult at all  PHQ9 TOTAL SCORE: 2

## 2024-11-26 NOTE — PATIENT INSTRUCTIONS
Patient Education   Preventive Care Advice   This is general advice given by our system to help you stay healthy. However, your care team may have specific advice just for you. Please talk to your care team about your preventive care needs.  Nutrition  Eat 5 or more servings of fruits and vegetables each day.  Try wheat bread, brown rice and whole grain pasta (instead of white bread, rice, and pasta).  Get enough calcium and vitamin D. Check the label on foods and aim for 100% of the RDA (recommended daily allowance).  Lifestyle  Exercise at least 150 minutes each week  (30 minutes a day, 5 days a week).  Do muscle strengthening activities 2 days a week. These help control your weight and prevent disease.  No smoking.  Wear sunscreen to prevent skin cancer.  Have a dental exam and cleaning every 6 months.  Yearly exams  See your health care team every year to talk about:  Any changes in your health.  Any medicines your care team has prescribed.  Preventive care, family planning, and ways to prevent chronic diseases.  Shots (vaccines)   HPV shots (up to age 26), if you've never had them before.  Hepatitis B shots (up to age 59), if you've never had them before.  COVID-19 shot: Get this shot when it's due.  Flu shot: Get a flu shot every year.  Tetanus shot: Get a tetanus shot every 10 years.  Pneumococcal, hepatitis A, and RSV shots: Ask your care team if you need these based on your risk.  Shingles shot (for age 50 and up)  General health tests  Diabetes screening:  Starting at age 35, Get screened for diabetes at least every 3 years.  If you are younger than age 35, ask your care team if you should be screened for diabetes.  Cholesterol test: At age 39, start having a cholesterol test every 5 years, or more often if advised.  Bone density scan (DEXA): At age 50, ask your care team if you should have this scan for osteoporosis (brittle bones).  Hepatitis C: Get tested at least once in your life.  STIs (sexually  transmitted infections)  Before age 24: Ask your care team if you should be screened for STIs.  After age 24: Get screened for STIs if you're at risk. You are at risk for STIs (including HIV) if:  You are sexually active with more than one person.  You don't use condoms every time.  You or a partner was diagnosed with a sexually transmitted infection.  If you are at risk for HIV, ask about PrEP medicine to prevent HIV.  Get tested for HIV at least once in your life, whether you are at risk for HIV or not.  Cancer screening tests  Cervical cancer screening: If you have a cervix, begin getting regular cervical cancer screening tests starting at age 21.  Breast cancer scan (mammogram): If you've ever had breasts, begin having regular mammograms starting at age 40. This is a scan to check for breast cancer.  Colon cancer screening: It is important to start screening for colon cancer at age 45.  Have a colonoscopy test every 10 years (or more often if you're at risk) Or, ask your provider about stool tests like a FIT test every year or Cologuard test every 3 years.  To learn more about your testing options, visit:   .  For help making a decision, visit:   https://bit.ly/qq88570.  Prostate cancer screening test: If you have a prostate, ask your care team if a prostate cancer screening test (PSA) at age 55 is right for you.  Lung cancer screening: If you are a current or former smoker ages 50 to 80, ask your care team if ongoing lung cancer screenings are right for you.  For informational purposes only. Not to replace the advice of your health care provider. Copyright   2023 [facility]. All rights reserved. Clinically reviewed by the [facility]. [employer] 367423 - REV 01/24.  Learning About Activities of Daily Living  What are activities of daily living?     Activities of daily living (ADLs) are the basic self-care tasks you do every day. These include eating, bathing, dressing,  and moving around.  As you age, and if you have health problems, you may find that it's harder to do some of these tasks. If so, your doctor can suggest ideas that may help.  To measure what kind of help you may need, your doctor will ask how well you are able to do ADLs. Let your doctor know if there are any tasks that you are having trouble doing. This is an important first step to getting help. And when you have the help you need, you can stay as independent as possible.  How will a doctor assess your ADLs?  Asking about ADLs is part of a routine health checkup your doctor will likely do as you age. Your health check might be done in a doctor's office, in your home, or at a hospital. The goal is to find out if you are having any problems that could make it hard to care for yourself or that make it unsafe for you to be on your own.  To measure your ADLs, your doctor will ask how hard it is for you to do routine tasks. Your doctor may also want to know if you have changed the way you do a task because of a health problem. Your doctor may watch how you:  Walk back and forth.  Keep your balance while you stand or walk.  Move from sitting to standing or from a bed to a chair.  Button or unbutton a shirt or sweater.  Remove and put on your shoes.  It's common to feel a little worried or anxious if you find you can't do all the things you used to be able to do. Talking with your doctor about ADLs is a way to make sure you're as safe as possible and able to care for yourself as well as you can. You may want to bring a caregiver, friend, or family member to your checkup. They can help you talk to your doctor.  Follow-up care is a key part of your treatment and safety. Be sure to make and go to all appointments, and call your doctor if you are having problems. It's also a good idea to know your test results and keep a list of the medicines you take.  Current as of: October 24, 2023  Content Version: 14.2 2024 [facility]  Apptive.   Care instructions adapted under license by your healthcare professional. If you have questions about a medical condition or this instruction, always ask your healthcare professional. [employer] disclaims any warranty or liability for your use of this information.

## 2024-12-04 ENCOUNTER — MYC MEDICAL ADVICE (OUTPATIENT)
Dept: FAMILY MEDICINE | Facility: CLINIC | Age: 69
End: 2024-12-04
Payer: COMMERCIAL

## 2024-12-05 NOTE — TELEPHONE ENCOUNTER
Result note was just recently sent.  Please let her know that a part of our cholesterol is genetic and can be high despite following a good diet.  Her level is not high enough to need any treatment and we can continue to monitor.

## (undated) DEVICE — TOURNIQUET CUFF 24" YELLOW LF 5921-024-135

## (undated) DEVICE — DRAPE SHEET REV FOLD 3/4 9349

## (undated) DEVICE — SOL NACL 0.9% IRRIG 1000ML BOTTLE 2F7124

## (undated) DEVICE — SU MONOCRYL 3-0 SH 27" UND Y416H

## (undated) DEVICE — CAST PLASTER SPLINT 4X15" 7394

## (undated) DEVICE — DRSG STERI STRIP 1/2X4" R1547

## (undated) DEVICE — DRSG ACTICOAT 7 4X5" 20141

## (undated) DEVICE — SOL WATER IRRIG 1000ML BOTTLE 2F7114

## (undated) DEVICE — CAST PADDING 4" STERILE 9044S

## (undated) DEVICE — SPONGE LAP 18X18" X8435

## (undated) DEVICE — PREP CHLORAPREP 26ML TINTED HI-LITE ORANGE 930815

## (undated) DEVICE — DRSG GAUZE 4X4" TRAY 6939

## (undated) DEVICE — ESU FCP BIPOLAR NONSTICK STR 4"X0.4MM W/CORD 19-3002AU

## (undated) DEVICE — DRAPE C-ARM MINI 5423

## (undated) DEVICE — LINEN TOWEL PACK X30 5481

## (undated) DEVICE — GLOVE BIOGEL PI MICRO INDICATOR UNDERGLOVE SZ 6.0 48960

## (undated) DEVICE — Device

## (undated) DEVICE — SUCTION MANIFOLD NEPTUNE 2 SYS 4 PORT 0702-020-000

## (undated) DEVICE — SPONGE RAY-TEC 4X8" 7318

## (undated) DEVICE — LINEN TOWEL PACK X6 WHITE 5487

## (undated) DEVICE — ESU GROUND PAD ADULT W/CORD E7507

## (undated) DEVICE — SLING ARM MED 79-99155

## (undated) DEVICE — GLOVE BIOGEL PI SZ 6.0 40860

## (undated) RX ORDER — FENTANYL CITRATE 50 UG/ML
INJECTION, SOLUTION INTRAMUSCULAR; INTRAVENOUS
Status: DISPENSED
Start: 2023-03-02

## (undated) RX ORDER — CEFAZOLIN SODIUM/WATER 2 G/20 ML
SYRINGE (ML) INTRAVENOUS
Status: DISPENSED
Start: 2023-03-02